# Patient Record
Sex: MALE | Race: WHITE | Employment: OTHER | URBAN - METROPOLITAN AREA
[De-identification: names, ages, dates, MRNs, and addresses within clinical notes are randomized per-mention and may not be internally consistent; named-entity substitution may affect disease eponyms.]

---

## 2017-03-05 ENCOUNTER — MYC MEDICAL ADVICE (OUTPATIENT)
Dept: INTERNAL MEDICINE | Facility: CLINIC | Age: 62
End: 2017-03-05

## 2017-03-06 NOTE — TELEPHONE ENCOUNTER
Per pt's mychart message below:  Cialis 5mg daily is req a PA.    Insur [phone]    Initiate a PA or diff rx for dx: slowing of urinary stream?    Last OV 12-2-16    Please advise, thanks.

## 2017-03-07 NOTE — TELEPHONE ENCOUNTER
Gael message sent to pt asking him to check with his insurance on what meds are preferred for slowing of the urinary stream.

## 2017-03-07 NOTE — TELEPHONE ENCOUNTER
Please have patient investigate which med is preferred for slowing of urinary stream--we should try this first (more likely to receive PA approval if formulary drug first tried).   Please advise pt.

## 2017-05-03 ENCOUNTER — MYC MEDICAL ADVICE (OUTPATIENT)
Dept: INTERNAL MEDICINE | Facility: CLINIC | Age: 62
End: 2017-05-03

## 2017-05-04 ENCOUNTER — MYC MEDICAL ADVICE (OUTPATIENT)
Dept: INTERNAL MEDICINE | Facility: CLINIC | Age: 62
End: 2017-05-04

## 2017-05-04 NOTE — TELEPHONE ENCOUNTER
Pt sent [employer] message stating [facility] needs additional information from our office before they can dispense Cialis 5mg. Pt asking that we call [phone] to address this.     Called phone number provided by pt, this for PA department and recording states they do not accept PA over the phone and to go to [email or web address] to complete PA for pt.     PA was previously completed for Cialis through [employer] in Dec 2016. Contacted pt, he now has [facility] for prescription coverage. [facility] information listed below for pt.     [facility]  ID: [id number]  Rx BIN: [id number]  Rx PCN: [id number]  Rx GRP: [id number]    PA submitted via [email or web address]. Response states that PA was already submitted and denied for pt. Case ID: [id number].     Contacted [employer], PA was denied due to no response from our office. PA re-opened, clinical questions answered. PA denied because pt has not tried alpha-1 blocker or alpha-5 regectase for BPH. Pt takes Cialis for both BPH and ED symptoms - PA representative states we can submit an appeal over the phone. Call transferred to the appeals department. Appeal submitted for Cialis 5mg for 90 tablets for 90 days and pt takes for ED and BPH.     Appeal approved over the phone for Cialis 5mg for 18 tabs per 90 days from 4/4/17-5/4/18. We will need to submit separate request for quantity override to allow for daily use. Transferred back to PA department.     PA submitted over the phone for Quantity Override.   Approved from 4/4/17-5/4/18.  ID: [id number]

## 2017-12-04 ENCOUNTER — HOSPITAL ENCOUNTER (OUTPATIENT)
Facility: CLINIC | Age: 62
Discharge: HOME | End: 2017-12-04
Attending: COLON & RECTAL SURGERY | Admitting: COLON & RECTAL SURGERY
Payer: COMMERCIAL

## 2017-12-04 VITALS
OXYGEN SATURATION: 97 % | SYSTOLIC BLOOD PRESSURE: 122 MMHG | BODY MASS INDEX: 27.09 KG/M2 | RESPIRATION RATE: 16 BRPM | HEIGHT: 72 IN | WEIGHT: 200 LBS | DIASTOLIC BLOOD PRESSURE: 68 MMHG

## 2017-12-04 LAB — COLONOSCOPY: NORMAL

## 2017-12-04 PROCEDURE — 45378 DIAGNOSTIC COLONOSCOPY: CPT | Performed by: COLON & RECTAL SURGERY

## 2017-12-04 PROCEDURE — G0500 MOD SEDAT ENDO SERVICE >5YRS: HCPCS | Performed by: COLON & RECTAL SURGERY

## 2017-12-04 PROCEDURE — G0121 COLON CA SCRN NOT HI RSK IND: HCPCS | Performed by: COLON & RECTAL SURGERY

## 2017-12-04 PROCEDURE — 25000128 H RX IP 250 OP 636: Performed by: COLON & RECTAL SURGERY

## 2017-12-04 PROCEDURE — 99153 MOD SED SAME PHYS/QHP EA: CPT | Performed by: COLON & RECTAL SURGERY

## 2017-12-04 RX ORDER — NALOXONE HYDROCHLORIDE 0.4 MG/ML
.1-.4 INJECTION, SOLUTION INTRAMUSCULAR; INTRAVENOUS; SUBCUTANEOUS
Status: DISCONTINUED | OUTPATIENT
Start: 2017-12-04 | End: 2017-12-04 | Stop reason: HOSPADM

## 2017-12-04 RX ORDER — FENTANYL CITRATE 50 UG/ML
INJECTION, SOLUTION INTRAMUSCULAR; INTRAVENOUS PRN
Status: DISCONTINUED | OUTPATIENT
Start: 2017-12-04 | End: 2017-12-04 | Stop reason: HOSPADM

## 2017-12-04 RX ORDER — FLUMAZENIL 0.1 MG/ML
0.2 INJECTION, SOLUTION INTRAVENOUS
Status: DISCONTINUED | OUTPATIENT
Start: 2017-12-04 | End: 2017-12-04 | Stop reason: HOSPADM

## 2017-12-04 RX ORDER — ONDANSETRON 2 MG/ML
4 INJECTION INTRAMUSCULAR; INTRAVENOUS
Status: DISCONTINUED | OUTPATIENT
Start: 2017-12-04 | End: 2017-12-04 | Stop reason: HOSPADM

## 2017-12-04 RX ORDER — ONDANSETRON 2 MG/ML
4 INJECTION INTRAMUSCULAR; INTRAVENOUS EVERY 6 HOURS PRN
Status: DISCONTINUED | OUTPATIENT
Start: 2017-12-04 | End: 2017-12-04 | Stop reason: HOSPADM

## 2017-12-04 RX ORDER — LIDOCAINE 40 MG/G
CREAM TOPICAL
Status: DISCONTINUED | OUTPATIENT
Start: 2017-12-04 | End: 2017-12-04 | Stop reason: HOSPADM

## 2017-12-04 RX ORDER — ONDANSETRON 4 MG/1
4 TABLET, ORALLY DISINTEGRATING ORAL EVERY 6 HOURS PRN
Status: DISCONTINUED | OUTPATIENT
Start: 2017-12-04 | End: 2017-12-04 | Stop reason: HOSPADM

## 2017-12-04 NOTE — H&P
Pre-Endoscopy History and Physical     Garrett Childers MRN# [id number]   YOB: 1955 Age: 61 year old     Date of Procedure: 12/4/2017  Primary care provider: Bong Vanegas  Type of Endoscopy: colonoscopy  Reason for Procedure: screening  Type of Anesthesia Anticipated: Moderate Sedation    HPI:    Garrett is a 61 year old male who will be undergoing the above procedure.      A history and physical has been performed. The patient's medications and allergies have been reviewed. The risks and benefits of the procedure and the sedation options and risks were discussed with the patient.  All questions were answered and informed consent was obtained.      He denies a personal or family history of anesthesia complications or bleeding disorders.     Allergies   Allergen Reactions     Lisinopril      Cough.        Prior to Admission Medications   Prescriptions Last Dose Informant Patient Reported? Taking?   tadalafil (CIALIS) 20 MG tablet More than a month  No No   Sig: Take 0.5-1 tablets (10-20 mg) by mouth daily as needed for erectile dysfunction   tadalafil (CIALIS) 5 MG tablet More than a month  No No   Sig: Take 1 tablet (5 mg) by mouth daily   triamterene-hydrochlorothiazide (MAXZIDE-25) 37.5-25 MG per tablet 12/3/2017  No Yes   Sig: Take 1 tablet by mouth daily      Facility-Administered Medications: None       Patient Active Problem List   Diagnosis     Essential hypertension with goal blood pressure less than 140/90     HYPERLIPIDEMIA LDL GOAL <130     Advanced directives, counseling/discussion     Lumbar facet arthropathy     DDD (degenerative disc disease), lumbar     Central spinal stenosis     Lumbar disc herniation        Past Medical History:   Diagnosis Date     Unspecified essential hypertension         Past Surgical History:   Procedure Laterality Date     C NONSPECIFIC PROCEDURE  ~1977    Left knee surg for osteochondroma     C NONSPECIFIC PROCEDURE  10/7/2015    Sebaceious cyst removal,  left upper arm     COLONOSCOPY  2007    Normal, repeat in 2017     HERNIORRHAPHY INGUINAL Left 2015    Procedure: HERNIORRHAPHY INGUINAL;  Surgeon: Tyler Michael MD;  Location: [facility] OR     ORTHOPEDIC SURGERY      corn shaved from left foot       Social History   Substance Use Topics     Smoking status: Passive Smoke Exposure - Never Smoker     Types: Cigars     Smokeless tobacco: Former User      Comment: cigar, rarely     Alcohol use Yes      Comment: 2 beers or one mixed drink daily, about three nights per week       Family History   Problem Relation Age of Onset     CANCER Daughter 15     PTLD B-cell large-cell Non-Hodgkin's,  at 16     Family History Negative Mother      Born 193. Lives in assisted living.      Eye Disorder Father      Macular degeneration.      Prostate Cancer Father      Hypertension Father      Neurologic Disorder Father       age 84. Alzheimers, also depression     Family History Negative Sister      Born      HEART DISEASE Daughter 0     twin born  heart transplant 3 months old CVA 1 /3 yo     CEREBROVASCULAR DISEASE Daughter 1     Family History Negative Son           Family History Negative Daughter      twin born      HEART DISEASE Maternal Grandmother       64yo MI     Neurologic Disorder Maternal Grandfather       62yo Parkinson's     HEART DISEASE Paternal Grandmother       69yo      Family History Negative Paternal Grandfather       91yo      Colon Cancer No family hx of        REVIEW OF SYSTEMS:     5 point ROS negative except as noted above in HPI, including Gen., Resp., CV, GI &  system review.      PHYSICAL EXAM:   /66  Resp 12  Ht 1.829 m (6')  Wt 90.7 kg (200 lb)  SpO2 96%  BMI 27.12 kg/m2 Estimated body mass index is 27.12 kg/(m^2) as calculated from the following:    Height as of this encounter: 1.829 m (6').    Weight as of this encounter: 90.7 kg (200 lb).   GENERAL APPEARANCE: healthy and  alert  MENTAL STATUS: alert  AIRWAY EXAM: Mallampatti Class II (visualization of the soft palate, fauces, and uvula)  RESP: lungs clear to auscultation - no rales, rhonchi or wheezes  CV: regular rates and rhythm      DIAGNOSTICS:    Not indicated      IMPRESSION   ASA Class 2 - Mild systemic disease        PLAN:       Plan for colonoscopy. We discussed the risks, benefits and alternatives and the patient wished to proceed.    The above has been forwarded to the consulting provider.      Signed Electronically by: Angeles Vyas MD  December 4, 2017

## 2017-12-04 NOTE — IP AVS SNAPSHOT
MRN:[id number]                      After Visit Summary   12/4/2017    Garrett Childers    MRN: [id number]           Thank you!     Thank you for choosing [facility] for your care. Our goal is always to provide you with excellent care. Hearing back from our patients is one way we can continue to improve our services. Please take a few minutes to complete the written survey that you may receive in the mail after you visit. If you would like to speak to someone directly about your visit please contact Patient Relations at [phone]. Thank you!          Patient Information     Date Of Birth          1955        About your hospital stay     You were admitted on:  December 4, 2017 You last received care in the:  [facility]    You were discharged on:  December 4, 2017       Who to Call     For medical emergencies, please call 911.  For non-urgent questions about your medical care, please call your primary care provider or clinic, [phone]  For questions related to your surgery, please call your surgery clinic        Attending Provider     Provider Specialty    Angeles Vyas MD Colon and Rectal Surgery       Primary Care Provider Office Phone # Fax #    Bong Vanegas [phone]      Your next 10 appointments already scheduled     Dec 08, 2017  8:00 AM CST   PHYSICAL with Bong [facility]as MD   [facility]    [facility] [address] [facility] [id number] [phone]              Further instructions from your care team       The patient has received a copy of the Provation  report the doctor has written and discharge instructions have been discussed with the patient and responsible adult.  All questions were addressed and answered prior to patient discharge.    Pending Results     No orders found from 12/2/2017 to 12/5/2017.            Admission Information     Date & Time Provider  Department Dept. Phone    12/4/2017 Angeles Vyas MD [facility] Endoscopy [phone]      Your Vitals Were     Blood Pressure Respirations Height Weight Pulse Oximetry BMI (Body Mass Index)    127/61 16 1.829 m (6') 90.7 kg (200 lb) 97% 27.12 kg/m2      MyChart Information     [employer] gives you secure access to your electronic health record. If you see a primary care provider, you can also send messages to your care team and make appointments. If you have questions, please call your primary care clinic.  If you do not have a primary care provider, please call [phone] and they will assist you.        Care EveryWhere ID     This is your Care EveryWhere ID. This could be used by other organizations to access your [facility] medical records  [id number]        Equal Access to Services     MARTA MASSEY : Shweta Valente, bello kan, jarrett lennon, randa oconnor. So [facility] [phone].    ATENCIÓN: Si habla español, tiene a gómez disposición servicios gratuitos de asistencia lingüística. [facility] [place] [phone].    We comply with applicable federal civil rights laws and [place] laws. We do not discriminate on the basis of race, color, national origin, age, disability, sex, sexual orientation, or gender identity.               Review of your medicines      CONTINUE these medicines which have NOT CHANGED        Dose / Directions    * tadalafil 5 MG tablet   Commonly known as:  CIALIS   Used for:  Slowing of urinary stream        Dose:  5 mg   Take 1 tablet (5 mg) by mouth daily   Quantity:  90 tablet   Refills:  3       * tadalafil 20 MG tablet   Commonly known as:  CIALIS   Used for:  Erectile dysfunction, unspecified erectile dysfunction type, Slowing of urinary stream        Dose:  10-20 mg   Take 0.5-1 tablets (10-20 mg) by mouth daily as needed for erectile dysfunction   Quantity:  36 tablet   Refills:  3       triamterene-hydrochlorothiazide  37.5-25 MG per tablet   Commonly known as:  MAXZIDE-25   Used for:  Essential hypertension with goal blood pressure less than 140/90        Dose:  1 tablet   Take 1 tablet by mouth daily   Quantity:  90 tablet   Refills:  3       * Notice:  This list has 2 medication(s) that are the same as other medications prescribed for you. Read the directions carefully, and ask your doctor or other care provider to review them with you.             Protect others around you: Learn how to safely use, store and throw away your medicines at www.[employer] [email or web address].             Medication List: This is a list of all your medications and when to take them. Check marks below indicate your daily home schedule. Keep this list as a reference.      Medications           Morning Afternoon Evening Bedtime As Needed    * tadalafil 5 MG tablet   Commonly known as:  CIALIS   Take 1 tablet (5 mg) by mouth daily                                * tadalafil 20 MG tablet   Commonly known as:  CIALIS   Take 0.5-1 tablets (10-20 mg) by mouth daily as needed for erectile dysfunction                                triamterene-hydrochlorothiazide 37.5-25 MG per tablet   Commonly known as:  MAXZIDE-25   Take 1 tablet by mouth daily                                * Notice:  This list has 2 medication(s) that are the same as other medications prescribed for you. Read the directions carefully, and ask your doctor or other care provider to review them with you.              More Information        Understanding Hemorrhoids    Hemorrhoid tissues are  cushions  of blood vessels that swell slightly during bowel movements. Too much pressure on the anal canal can make these tissues remain enlarged, become inflamed, and cause symptoms. This can happen both inside and outside the anal canal.  Parts of the anal canal  The parts of the anal canal are:    Internal hemorrhoid tissue is in the upper area of the anal canal.    The rectum is the last several inches of the  colon. This is where stool is stored prior to bowel movements.    Anal sphincters are ring-shaped muscles that expand and contract to control the anal opening.    External hemorrhoid tissue lies under the anal skin.    The anus is the passage between the rectum and the outside of the body.  Normal hemorrhoid tissue  Hemorrhoid tissues play an important role in helping your body eliminate waste. Food passes from the stomach through the intestines. The waste (stool) then travels through the colon to the rectum. It is stored in the rectum until it s ready to be passed from the anus. During bowel movements, hemorrhoids swell with blood and become slightly larger. This swelling helps protect and cushion the anal canal as stool passes from the body. Once the stool has passed, the tissues stop swelling and return to normal.  Problem hemorrhoids  Pressure due to straining or other factors can cause hemorrhoid tissues to remain swollen. When this happens to the hemorrhoid tissues in the anal canal they re called internal hemorrhoids. Swollen tissues around the anal opening are called external hemorrhoids. Depending on the location, your symptoms can differ.    Internal hemorrhoids often happen in clusters around the wall of the anal canal. They are usually painless. But they may prolapse (protrude out of the anus) due to straining or pressure from hard stool. After the bowel movement is over, they may then reduce (return inside the body). Internal hemorrhoids often bleed. They can also discharge mucus.      External hemorrhoids are located at the anal opening, just beneath the skin. These tissues rarely cause problems unless they thrombose (form a blood clot). When this happens, a hard, bluish lump may appear. A thrombosed hemorrhoid also causes sudden, severe pain. In time, the clot may go away on its own. This sometimes leaves a  skin tag  of tissue stretched by the clot.  Hemorrhoid symptoms  Hemorrhoid symptoms may  include:    Pain or a burning sensation    Bleeding during bowel movements    Protrusion of tissue from the anus    Itching around the anus  Causes of hemorrhoids  There s no single cause of hemorrhoids. Most often, though, they are caused by too much pressure on the anal canal. This can be due to:    Chronic (ongoing) constipation    Straining during bowel movements    Sitting too long on the toilet    Strenuous exercise or heavy lifting    Pregnancy and childbirth    Aging    Diarrhea  Date Last Reviewed: 7/1/2016 2000-2017 [employer]. [address] [place], [place] [address]. All rights reserved. This information is not intended as a substitute for professional medical care. Always follow your healthcare professional's instructions.

## 2017-12-04 NOTE — OP NOTE
See Provation Note In Chart    Angeles Vyas MD  [facility]  Office Phone # [phone]

## 2017-12-14 ENCOUNTER — MYC MEDICAL ADVICE (OUTPATIENT)
Dept: INTERNAL MEDICINE | Facility: CLINIC | Age: 62
End: 2017-12-14

## 2017-12-14 DIAGNOSIS — R39.198 SLOWING OF URINARY STREAM: ICD-10-CM

## 2017-12-15 RX ORDER — TADALAFIL 5 MG/1
5 TABLET ORAL DAILY
Qty: 90 TABLET | Refills: 0 | Status: SHIPPED | OUTPATIENT
Start: 2017-12-15 | End: 2018-02-16

## 2017-12-15 NOTE — TELEPHONE ENCOUNTER
Pt missed appt on 12/8/17, rescheduled to February. Pt asks if he can get refill on Cialis until his appt.     Next 5 appointments (look out 90 days)     Feb 16, 2018  8:20 AM CST   PHYSICAL with Bong [facility]as MD   [facility]    [address]  [facility] [address]   [phone]                 Medication is being filled for 1 time refill only due to:  Patient needs to be seen because it has been more than one year since last visit.

## 2018-02-15 DIAGNOSIS — I10 ESSENTIAL HYPERTENSION WITH GOAL BLOOD PRESSURE LESS THAN 140/90: ICD-10-CM

## 2018-02-16 ENCOUNTER — OFFICE VISIT (OUTPATIENT)
Dept: INTERNAL MEDICINE | Facility: CLINIC | Age: 63
End: 2018-02-16
Payer: COMMERCIAL

## 2018-02-16 VITALS
BODY MASS INDEX: 26.51 KG/M2 | SYSTOLIC BLOOD PRESSURE: 120 MMHG | WEIGHT: 200 LBS | HEIGHT: 73 IN | HEART RATE: 77 BPM | DIASTOLIC BLOOD PRESSURE: 70 MMHG | TEMPERATURE: 98 F | OXYGEN SATURATION: 100 %

## 2018-02-16 DIAGNOSIS — Z12.5 SPECIAL SCREENING FOR MALIGNANT NEOPLASM OF PROSTATE: ICD-10-CM

## 2018-02-16 DIAGNOSIS — I10 ESSENTIAL HYPERTENSION WITH GOAL BLOOD PRESSURE LESS THAN 140/90: ICD-10-CM

## 2018-02-16 DIAGNOSIS — Z00.00 ROUTINE GENERAL MEDICAL EXAMINATION AT A HEALTH CARE FACILITY: Primary | ICD-10-CM

## 2018-02-16 DIAGNOSIS — Z23 NEED FOR TETANUS BOOSTER: ICD-10-CM

## 2018-02-16 DIAGNOSIS — Z11.59 NEED FOR HEPATITIS C SCREENING TEST: ICD-10-CM

## 2018-02-16 DIAGNOSIS — R39.198 SLOWING OF URINARY STREAM: ICD-10-CM

## 2018-02-16 DIAGNOSIS — E78.5 HYPERLIPIDEMIA LDL GOAL <130: ICD-10-CM

## 2018-02-16 LAB
ERYTHROCYTE [DISTWIDTH] IN BLOOD BY AUTOMATED COUNT: 12.9 % (ref 10–15)
HCT VFR BLD AUTO: 48.7 % (ref 40–53)
HGB BLD-MCNC: 15.9 G/DL (ref 13.3–17.7)
MCH RBC QN AUTO: 30.7 PG (ref 26.5–33)
MCHC RBC AUTO-ENTMCNC: 32.6 G/DL (ref 31.5–36.5)
MCV RBC AUTO: 94 FL (ref 78–100)
PLATELET # BLD AUTO: 176 10E9/L (ref 150–450)
PSA SERPL-ACNC: 3.84 UG/L (ref 0–4)
RBC # BLD AUTO: 5.18 10E12/L (ref 4.4–5.9)
WBC # BLD AUTO: 4.4 10E9/L (ref 4–11)

## 2018-02-16 PROCEDURE — 99396 PREV VISIT EST AGE 40-64: CPT | Performed by: INTERNAL MEDICINE

## 2018-02-16 PROCEDURE — 80053 COMPREHEN METABOLIC PANEL: CPT | Performed by: INTERNAL MEDICINE

## 2018-02-16 PROCEDURE — 36415 COLL VENOUS BLD VENIPUNCTURE: CPT | Performed by: INTERNAL MEDICINE

## 2018-02-16 PROCEDURE — 85027 COMPLETE CBC AUTOMATED: CPT | Performed by: INTERNAL MEDICINE

## 2018-02-16 PROCEDURE — 90715 TDAP VACCINE 7 YRS/> IM: CPT | Performed by: INTERNAL MEDICINE

## 2018-02-16 PROCEDURE — 80061 LIPID PANEL: CPT | Performed by: INTERNAL MEDICINE

## 2018-02-16 PROCEDURE — G0103 PSA SCREENING: HCPCS | Performed by: INTERNAL MEDICINE

## 2018-02-16 PROCEDURE — 86803 HEPATITIS C AB TEST: CPT | Performed by: INTERNAL MEDICINE

## 2018-02-16 PROCEDURE — 84443 ASSAY THYROID STIM HORMONE: CPT | Performed by: INTERNAL MEDICINE

## 2018-02-16 RX ORDER — TADALAFIL 5 MG/1
5 TABLET ORAL DAILY
Qty: 90 TABLET | Refills: 3 | Status: SHIPPED | OUTPATIENT
Start: 2018-02-16 | End: 2019-02-15

## 2018-02-16 RX ORDER — TRIAMTERENE/HYDROCHLOROTHIAZID 37.5-25 MG
1 TABLET ORAL DAILY
Qty: 90 TABLET | Refills: 3 | Status: SHIPPED | OUTPATIENT
Start: 2018-02-16 | End: 2019-02-11

## 2018-02-16 NOTE — PROGRESS NOTES
SUBJECTIVE:   CC: Garrett Childers is an 62 year old male who presents for preventative health visit.     Physical   Annual:     Getting at least 3 servings of Calcium per day::  Yes    Bi-annual eye exam::  Yes    Dental care twice a year::  Yes    Sleep apnea or symptoms of sleep apnea::  None    Diet::  Carbohydrate counting    Frequency of exercise::  4-5 days/week    Duration of exercise::  30-45 minutes    Taking medications regularly::  Yes    Medication side effects::  None    Additional concerns today::  YES          Past/recent records reviewed and discussed for:  -Had a colonoscopy performed 12/2017  -The past two days he has woken up with a slight productive cough.     Weight  He goes to [email or web address] 3-4 times a week. He has lost about 10 lbs since last clinic visit.     Hypertension  BP at target today, 120/70.  He is taking triamterene-HCTZ daily.     Slowing of urinary stream  Taking cialis 5 mg which works well for him. Requested refill. Notes that slowing of urinary stream improved. He hardly wakes up to urinate at night now. He denies any dysuria or hematuria.     Low back pain  He hurt his back 2-3 years ago but recently hurt it again. Notes a low right back pain that radiates down to the right thigh but does not go past the knee. He denies any leg weakness or numbness.      Today's PHQ-2 Score:   PHQ-2 ( 1999 [employer]izer) 2/13/2018   Q1: Little interest or pleasure in doing things 0   Q2: Feeling down, depressed or hopeless 0   PHQ-2 Score 0   Q1: Little interest or pleasure in doing things Not at all   Q2: Feeling down, depressed or hopeless Not at all   PHQ-2 Score 0     Abuse: Current or Past(Physical, Sexual or Emotional)- No  Do you feel safe in your environment - Yes    Social History   Substance Use Topics     Smoking status: Passive Smoke Exposure - Never Smoker     Types: Cigars     Smokeless tobacco: Former User     Quit date: 1/1/2005      Comment: cigar, rarely     Alcohol use Yes       "Comment: <6 drinks/week. May have 2 beers or one mixed drinks on a given day     Alcohol Use 2/13/2018   If you drink alcohol, do you typically have greater than 3 drinks per day OR greater than 7 drinks per week?   No     Last PSA:   PSA   Date Value Ref Range Status   12/02/2016 2.92 0 - 4 ug/L Final     Comment:     Assay Method:  Chemiluminescence using Siemens Vista analyzer     Reviewed orders with patient. Reviewed health maintenance and updated orders accordingly - Yes    Reviewed and updated as needed this visit by clinical staff  Allergies  Meds         Reviewed and updated as needed this visit by Provider        Review of Systems  C: NEGATIVE for fever, chills, change in weight  I: NEGATIVE for worrisome rashes, moles or lesions  E: NEGATIVE for vision changes or irritation  ENT: NEGATIVE for ear, mouth and throat problems  R: NEGATIVE for significant cough or SOB  CV: NEGATIVE for chest pain, palpitations or peripheral edema  GI: NEGATIVE for nausea, abdominal pain, heartburn, or change in bowel habits   male: negative for dysuria, hematuria, decreased urinary stream, erectile dysfunction, urethral discharge  M: POSITIVE for low right back pain NEGATIVE for significant arthralgias or myalgia  N: NEGATIVE for weakness, dizziness or paresthesias  P: NEGATIVE for changes in mood or affect    This document serves as a record of the services and decisions personally performed and made by Bong Vanegas MD. It was created on his behalf by Sheree Jasso, a trained medical scribe. The creation of this document is based on the provider's statements to the medical scribe.  Sheree Jasso February 16, 2018 8:43 AM     OBJECTIVE:   /70 (BP Location: Left arm, Cuff Size: Adult Large)  Pulse 77  Temp 98  F (36.7  C) (Oral)  Ht 1.842 m (6' 0.5\")  Wt 90.7 kg (200 lb)  SpO2 100%  BMI 26.75 kg/m2    Physical Exam  GENERAL: healthy, alert and no distress  EYES: Eyes grossly normal to inspection, PERRL and " conjunctivae and sclerae normal  HENT: ear canals and TM's normal, nose and mouth without ulcers or lesions  NECK: no adenopathy, no asymmetry, masses, or scars and thyroid normal to palpation  RESP: lungs clear to auscultation - no rales, rhonchi or wheezes  CV: regular rate and rhythm, normal S1 S2, no S3 or S4, no murmur, click or rub, no peripheral edema and peripheral pulses strong  ABDOMEN: soft, nontender, no hepatosplenomegaly, no masses and bowel sounds normal   (male): normal male genitalia without lesions or urethral discharge, no hernia  RECTAL: normal sphincter tone, no rectal masses, prostate normal size, smooth, nontender without nodules or masses  MS: no gross musculoskeletal defects noted, no edema  SKIN: no suspicious lesions or rashes  NEURO: Normal strength and tone, mentation intact and speech normal  PSYCH: mentation appears normal, affect normal/bright    ASSESSMENT/PLAN:   (Z00.00) Routine general medical examination at a health care facility  (primary encounter diagnosis)  Comment: Stable health. See epic orders.    (E78.5) Hyperlipidemia LDL goal <130  Comment: Updating lipids today. Not on a statin    (I10) Essential hypertension with goal blood pressure less than 140/90  Comment: BP at target. Continue current meds.  Plan: triamterene-hydrochlorothiazide (MAXZIDE-25)         37.5-25 MG per tablet, aspirin 81 MG tablet          (Z23) Need for tetanus booster  Comment: Administered today  Plan: TDAP VACCINE (ADACEL)          (Z11.59) Need for hepatitis C screening test  Comment: Screening today  Plan: **Hepatitis C Screen Reflex to RNA FUTURE         anytime          (Z12.5) Special screening for malignant neoplasm of prostate  Comment: PSA screening today    (R39.198) Slowing of urinary stream  Comment: Improved with cialis. Refilled rx, continue current meds.   Plan: tadalafil (CIALIS) 5 MG tablet         Everything looks fine!    Refills of medications have been faxed to your pharmacy.  "    I'll get back to you with lab results soon, especially if there is anything of concern.      See you in a year, sooner if problems.       COUNSELING:   Reviewed preventive health counseling, as reflected in patient instructions       Regular exercise       Healthy diet/nutrition       Colon cancer screening       Prostate cancer screening     reports that he is a non-smoker but has been exposed to tobacco smoke. He quit smokeless tobacco use about 13 years ago.  Estimated body mass index is 26.75 kg/(m^2) as calculated from the following:    Height as of this encounter: 1.842 m (6' 0.5\").    Weight as of this encounter: 90.7 kg (200 lb).   Weight management plan: Discussed healthy diet and exercise guidelines and patient will follow up in 12 months in clinic to re-evaluate.    Counseling Resources:  ATP IV Guidelines  Pooled Cohorts Equation Calculator  FRAX Risk Assessment  ICSI Preventive Guidelines  Dietary Guidelines for Americans, 2010  USDA's MyPlate  ASA Prophylaxis  Lung CA Screening    The information in this document, created by the medical scribe for me, accurately reflects the services I personally performed and the decisions made by me. I have reviewed and approved this document for accuracy prior to leaving the patient care area.  February 16, 2018 8:43 AM    Bong Vanegas MD  [facility]    Answers for HPI/ROS submitted by the patient on 2/13/2018   PHQ-2 Score: 0    "

## 2018-02-16 NOTE — NURSING NOTE
"Chief Complaint   Patient presents with     Physical     fasting       Initial /70 (BP Location: Left arm, Cuff Size: Adult Large)  Pulse 77  Temp 98  F (36.7  C) (Oral)  Ht 6' 0.5\" (1.842 m)  Wt 200 lb (90.7 kg)  SpO2 100%  BMI 26.75 kg/m2 Estimated body mass index is 26.75 kg/(m^2) as calculated from the following:    Height as of this encounter: 6' 0.5\" (1.842 m).    Weight as of this encounter: 200 lb (90.7 kg).  Medication Reconciliation: complete   Angelia Barragan CMA      "

## 2018-02-16 NOTE — MR AVS SNAPSHOT
After Visit Summary   2/16/2018    Garrett Childers    MRN: [id number]           Patient Information     Date Of Birth          1955        Visit Information        Provider Department      2/16/2018 8:20 AM Bong Vanegas MD [facility]        Today's Diagnoses     Routine general medical examination at a health care facility    -  1    Hyperlipidemia LDL goal <130        Essential hypertension with goal blood pressure less than 140/90        Need for tetanus booster        Need for hepatitis C screening test        Special screening for malignant neoplasm of prostate        Slowing of urinary stream          Care Instructions    Everything looks fine!    Refills of medications have been faxed to your pharmacy.     I'll get back to you with lab results soon, especially if there is anything of concern.      See you in a year, sooner if problems.            Follow-ups after your visit        Who to contact     If you have questions or need follow up information about today's clinic visit or your schedule please contact [facility] directly at [phone].  Normal or non-critical lab and imaging results will be communicated to you by MyChart, letter or phone within 4 business days after the clinic has received the results. If you do not hear from us within 7 days, please contact the clinic through [employer] or phone. If you have a critical or abnormal lab result, we will notify you by phone as soon as possible.  Submit refill requests through [employer] or call your pharmacy and they will forward the refill request to us. Please allow 3 business days for your refill to be completed.          Additional Information About Your Visit        [employer] Information     [employer] gives you secure access to your electronic health record. If you see a primary care provider, you can also send messages to your care team and make appointments. If you have questions, please call your  "primary care clinic.  If you do not have a primary care provider, please call [phone] and they will assist you.        Care EveryWhere ID     This is your Care EveryWhere ID. This could be used by other organizations to access your [place] medical records  [id number]        Your Vitals Were     Pulse Temperature Height Pulse Oximetry BMI (Body Mass Index)       77 98  F (36.7  C) (Oral) 6' 0.5\" (1.842 m) 100% 26.75 kg/m2        Blood Pressure from Last 3 Encounters:   02/16/18 120/70   12/04/17 122/68   12/02/16 128/74    Weight from Last 3 Encounters:   02/16/18 200 lb (90.7 kg)   12/04/17 200 lb (90.7 kg)   12/02/16 209 lb (94.8 kg)              We Performed the Following     **Hepatitis C Screen Reflex to RNA FUTURE anytime     CBC with platelets     Comprehensive metabolic panel     Lipid panel reflex to direct LDL Fasting     Prostate spec antigen screen     TDAP VACCINE (ADACEL)     TSH with free T4 reflex          Today's Medication Changes          These changes are accurate as of 2/16/18  9:09 AM.  If you have any questions, ask your nurse or doctor.               Start taking these medicines.        Dose/Directions    aspirin 81 MG tablet   Used for:  Essential hypertension with goal blood pressure less than 140/90   Started by:  Bong Vanegas MD        Dose:  81 mg   Take 1 tablet (81 mg) by mouth daily   Quantity:  30 tablet   Refills:  0            Where to get your medicines      These medications were sent to [facility] [address] [address]     Phone:  [phone]     tadalafil 5 MG tablet    triamterene-hydrochlorothiazide 37.5-25 MG per tablet         Some of these will need a paper prescription and others can be bought over the counter.  Ask your nurse if you have questions.     You don't need a prescription for these medications     aspirin 81 MG tablet                Primary Care Provider Office Phone # " Fax #    Bong Vanegas [phone]       [address] [facility] [address]  [facility] [address]        Equal Access to Services     MARTA MASSEY : Hadtitus uri goddard zohaibo Sojarekali, waessieda luqadaha, qaybta kaalmada citlalli, randa barbosalilliana anastasia. So [facility] [phone].    ATENCIÓN: Si habla español, tiene a gómez disposición servicios gratuitos de asistencia lingüística. Llame al [phone].    We comply with applicable federal civil rights laws and [place] laws. We do not discriminate on the basis of race, color, national origin, age, disability, sex, sexual orientation, or gender identity.            Thank you!     Thank you for choosing [facility]  for your care. Our goal is always to provide you with excellent care. Hearing back from our patients is one way we can continue to improve our services. Please take a few minutes to complete the written survey that you may receive in the mail after your visit with us. Thank you!             Your Updated Medication List - Protect others around you: Learn how to safely use, store and throw away your medicines at www.disposemymeds.org.          This list is accurate as of 2/16/18  9:09 AM.  Always use your most recent med list.                   Brand Name Dispense Instructions for use Diagnosis    aspirin 81 MG tablet     30 tablet    Take 1 tablet (81 mg) by mouth daily    Essential hypertension with goal blood pressure less than 140/90       * tadalafil 20 MG tablet    CIALIS    36 tablet    Take 0.5-1 tablets (10-20 mg) by mouth daily as needed for erectile dysfunction    Erectile dysfunction, unspecified erectile dysfunction type, Slowing of urinary stream       * tadalafil 5 MG tablet    CIALIS    90 tablet    Take 1 tablet (5 mg) by mouth daily    Slowing of urinary stream       triamterene-hydrochlorothiazide 37.5-25 MG per tablet    MAXZIDE-25    90 tablet    Take 1 tablet by mouth daily    Essential hypertension with  goal blood pressure less than 140/90       * Notice:  This list has 2 medication(s) that are the same as other medications prescribed for you. Read the directions carefully, and ask your doctor or other care provider to review them with you.

## 2018-02-17 LAB
ALBUMIN SERPL-MCNC: 4.1 G/DL (ref 3.4–5)
ALP SERPL-CCNC: 80 U/L (ref 40–150)
ALT SERPL W P-5'-P-CCNC: 47 U/L (ref 0–70)
ANION GAP SERPL CALCULATED.3IONS-SCNC: 4 MMOL/L (ref 3–14)
AST SERPL W P-5'-P-CCNC: 37 U/L (ref 0–45)
BILIRUB SERPL-MCNC: 0.6 MG/DL (ref 0.2–1.3)
BUN SERPL-MCNC: 13 MG/DL (ref 7–30)
CALCIUM SERPL-MCNC: 9 MG/DL (ref 8.5–10.1)
CHLORIDE SERPL-SCNC: 102 MMOL/L (ref 94–109)
CHOLEST SERPL-MCNC: 191 MG/DL
CO2 SERPL-SCNC: 34 MMOL/L (ref 20–32)
CREAT SERPL-MCNC: 0.83 MG/DL (ref 0.66–1.25)
GFR SERPL CREATININE-BSD FRML MDRD: >90 ML/MIN/1.7M2
GLUCOSE SERPL-MCNC: 88 MG/DL (ref 70–99)
HDLC SERPL-MCNC: 47 MG/DL
LDLC SERPL CALC-MCNC: 116 MG/DL
NONHDLC SERPL-MCNC: 144 MG/DL
POTASSIUM SERPL-SCNC: 3.9 MMOL/L (ref 3.4–5.3)
PROT SERPL-MCNC: 8 G/DL (ref 6.8–8.8)
SODIUM SERPL-SCNC: 140 MMOL/L (ref 133–144)
TRIGL SERPL-MCNC: 140 MG/DL
TSH SERPL DL<=0.005 MIU/L-ACNC: 2.75 MU/L (ref 0.4–4)

## 2018-02-19 LAB — HCV AB SERPL QL IA: NONREACTIVE

## 2018-02-19 RX ORDER — TRIAMTERENE/HYDROCHLOROTHIAZID 37.5-25 MG
TABLET ORAL
Qty: 90 TABLET | Refills: 2 | OUTPATIENT
Start: 2018-02-19

## 2018-05-30 ENCOUNTER — TELEPHONE (OUTPATIENT)
Dept: INTERNAL MEDICINE | Facility: CLINIC | Age: 63
End: 2018-05-30

## 2018-05-30 NOTE — TELEPHONE ENCOUNTER
PA request from [employer] for Cialis     They sent 2-pages of questions to be answered by the doctor. Then they will decide if it will be covered or not.    Form is in 's yellow folder  Fax back

## 2018-06-11 ENCOUNTER — MYC MEDICAL ADVICE (OUTPATIENT)
Dept: INTERNAL MEDICINE | Facility: CLINIC | Age: 63
End: 2018-06-11

## 2019-02-11 DIAGNOSIS — I10 ESSENTIAL HYPERTENSION WITH GOAL BLOOD PRESSURE LESS THAN 140/90: ICD-10-CM

## 2019-02-11 NOTE — TELEPHONE ENCOUNTER
"Requested Prescriptions   Pending Prescriptions Disp Refills     triamterene-HCTZ (MAXZIDE-25) 37.5-25 MG tablet [Pharmacy Med Name: TRIAMTERENE/HCTZ TABS 37.5/25]  Last Written Prescription Date:  2/16/2018  Last Fill Quantity: 90,  # refills: 3   Last office visit: 2/16/2018 with prescribing provider:     Future Office Visit:   Next 5 appointments (look out 90 days)    Feb 22, 2019  9:20 AM CST  [facility] [address]  [facility] [address]  [phone]        90 tablet 3     Sig: TAKE 1 TABLET DAILY    Diuretics (Including Combos) Protocol Passed - 2/11/2019 12:15 AM       Passed - Blood pressure under 140/90 in past 12 months    BP Readings from Last 3 Encounters:   02/16/18 120/70   12/04/17 122/68   12/02/16 128/74                Passed - Recent (12 mo) or future (30 days) visit within the authorizing provider's specialty    Patient had office visit in the last 12 months or has a visit in the next 30 days with authorizing provider or within the authorizing provider's specialty.  See \"Patient Info\" tab in inbasket, or \"Choose Columns\" in Meds & Orders section of the refill encounter.             Passed - Medication is active on med list       Passed - Patient is age 18 or older       Passed - Normal serum creatinine on file in past 12 months    Recent Labs   Lab Test 02/16/18 0913   CR 0.83             Passed - Normal serum potassium on file in past 12 months    Recent Labs   Lab Test 02/16/18 0913   POTASSIUM 3.9                   Passed - Normal serum sodium on file in past 12 months    Recent Labs   Lab Test 02/16/18 0913                 "

## 2019-02-13 RX ORDER — TRIAMTERENE/HYDROCHLOROTHIAZID 37.5-25 MG
TABLET ORAL
Qty: 90 TABLET | Refills: 0 | Status: SHIPPED | OUTPATIENT
Start: 2019-02-13 | End: 2019-02-22

## 2019-02-13 NOTE — TELEPHONE ENCOUNTER
Prescription approved per [facility] Refill Protocol. BRYCE Kunz R.N.

## 2019-02-15 DIAGNOSIS — R39.198 SLOWING OF URINARY STREAM: ICD-10-CM

## 2019-02-15 NOTE — TELEPHONE ENCOUNTER
"Requested Prescriptions   Pending Prescriptions Disp Refills     CIALIS 5 MG tablet [Pharmacy Med Name: CIALIS TABS 5MG] 90 tablet 3    Last Written Prescription Date:  02/16/2018  Last Fill Quantity: 90,  # refills: 3   Last office visit: 2/16/2018 with prescribing provider:     Future Office Visit:   Next 5 appointments (look out 90 days)    Feb 22, 2019  9:20 AM CST  [facility] [address]  [facility] [address]  [phone]        Sig: TAKE 1 TABLET DAILY    Erectile Dysfuction Protocol Passed - 2/15/2019 12:49 PM       Passed - Absence of nitrates on medication list       Passed - Absence of Alpha Blockers on Med list       Passed - Recent (12 mo) or future (30 days) visit within the authorizing provider's specialty    Patient had office visit in the last 12 months or has a visit in the next 30 days with authorizing provider or within the authorizing provider's specialty.  See \"Patient Info\" tab in inbasket, or \"Choose Columns\" in Meds & Orders section of the refill encounter.             Passed - Medication is active on med list       Passed - Patient is age 18 or older        "

## 2019-02-18 RX ORDER — TADALAFIL 5 MG
TABLET ORAL
Qty: 90 TABLET | Refills: 0 | Status: SHIPPED | OUTPATIENT
Start: 2019-02-18 | End: 2019-02-22

## 2019-02-20 ASSESSMENT — ENCOUNTER SYMPTOMS
CHILLS: 0
DIARRHEA: 0
PALPITATIONS: 0
DYSURIA: 0
PARESTHESIAS: 0
HEARTBURN: 0
FREQUENCY: 1
WEAKNESS: 0
JOINT SWELLING: 0
DIZZINESS: 0
HEADACHES: 0
NERVOUS/ANXIOUS: 0
EYE PAIN: 0
SHORTNESS OF BREATH: 0
MYALGIAS: 0
FEVER: 0
SORE THROAT: 0
NAUSEA: 0
HEMATOCHEZIA: 0
ABDOMINAL PAIN: 0
CONSTIPATION: 0
ARTHRALGIAS: 0
HEMATURIA: 0
COUGH: 0

## 2019-02-22 ENCOUNTER — OFFICE VISIT (OUTPATIENT)
Dept: INTERNAL MEDICINE | Facility: CLINIC | Age: 64
End: 2019-02-22
Payer: COMMERCIAL

## 2019-02-22 VITALS
BODY MASS INDEX: 27.9 KG/M2 | WEIGHT: 206 LBS | TEMPERATURE: 97.6 F | HEART RATE: 72 BPM | SYSTOLIC BLOOD PRESSURE: 122 MMHG | DIASTOLIC BLOOD PRESSURE: 70 MMHG | HEIGHT: 72 IN | RESPIRATION RATE: 16 BRPM | OXYGEN SATURATION: 98 %

## 2019-02-22 DIAGNOSIS — R39.198 SLOWING OF URINARY STREAM: ICD-10-CM

## 2019-02-22 DIAGNOSIS — Z00.00 ANNUAL PHYSICAL EXAM: Primary | ICD-10-CM

## 2019-02-22 DIAGNOSIS — I10 ESSENTIAL HYPERTENSION WITH GOAL BLOOD PRESSURE LESS THAN 140/90: ICD-10-CM

## 2019-02-22 LAB
ALBUMIN SERPL-MCNC: 4.1 G/DL (ref 3.4–5)
ALP SERPL-CCNC: 67 U/L (ref 40–150)
ALT SERPL W P-5'-P-CCNC: 44 U/L (ref 0–70)
ANION GAP SERPL CALCULATED.3IONS-SCNC: 6 MMOL/L (ref 3–14)
AST SERPL W P-5'-P-CCNC: 29 U/L (ref 0–45)
BASOPHILS # BLD AUTO: 0 10E9/L (ref 0–0.2)
BASOPHILS NFR BLD AUTO: 0.7 %
BILIRUB SERPL-MCNC: 0.8 MG/DL (ref 0.2–1.3)
BUN SERPL-MCNC: 15 MG/DL (ref 7–30)
CALCIUM SERPL-MCNC: 9.2 MG/DL (ref 8.5–10.1)
CHLORIDE SERPL-SCNC: 103 MMOL/L (ref 94–109)
CHOLEST SERPL-MCNC: 194 MG/DL
CO2 SERPL-SCNC: 28 MMOL/L (ref 20–32)
CREAT SERPL-MCNC: 0.94 MG/DL (ref 0.66–1.25)
DIFFERENTIAL METHOD BLD: NORMAL
EOSINOPHIL # BLD AUTO: 0.3 10E9/L (ref 0–0.7)
EOSINOPHIL NFR BLD AUTO: 6.5 %
ERYTHROCYTE [DISTWIDTH] IN BLOOD BY AUTOMATED COUNT: 13.3 % (ref 10–15)
GFR SERPL CREATININE-BSD FRML MDRD: 87 ML/MIN/{1.73_M2}
GLUCOSE SERPL-MCNC: 92 MG/DL (ref 70–99)
HCT VFR BLD AUTO: 47.1 % (ref 40–53)
HDLC SERPL-MCNC: 49 MG/DL
HGB BLD-MCNC: 15.7 G/DL (ref 13.3–17.7)
LDLC SERPL CALC-MCNC: 121 MG/DL
LYMPHOCYTES # BLD AUTO: 1.8 10E9/L (ref 0.8–5.3)
LYMPHOCYTES NFR BLD AUTO: 41.2 %
MCH RBC QN AUTO: 31.2 PG (ref 26.5–33)
MCHC RBC AUTO-ENTMCNC: 33.3 G/DL (ref 31.5–36.5)
MCV RBC AUTO: 94 FL (ref 78–100)
MONOCYTES # BLD AUTO: 0.5 10E9/L (ref 0–1.3)
MONOCYTES NFR BLD AUTO: 12.6 %
NEUTROPHILS # BLD AUTO: 1.7 10E9/L (ref 1.6–8.3)
NEUTROPHILS NFR BLD AUTO: 39 %
NONHDLC SERPL-MCNC: 145 MG/DL
PLATELET # BLD AUTO: 169 10E9/L (ref 150–450)
POTASSIUM SERPL-SCNC: 3.4 MMOL/L (ref 3.4–5.3)
PROT SERPL-MCNC: 7.2 G/DL (ref 6.8–8.8)
PSA SERPL-ACNC: 3.69 UG/L (ref 0–4)
RBC # BLD AUTO: 5.04 10E12/L (ref 4.4–5.9)
SODIUM SERPL-SCNC: 137 MMOL/L (ref 133–144)
TRIGL SERPL-MCNC: 121 MG/DL
TSH SERPL DL<=0.005 MIU/L-ACNC: 3.93 MU/L (ref 0.4–4)
WBC # BLD AUTO: 4.3 10E9/L (ref 4–11)

## 2019-02-22 PROCEDURE — 80053 COMPREHEN METABOLIC PANEL: CPT | Performed by: INTERNAL MEDICINE

## 2019-02-22 PROCEDURE — 36415 COLL VENOUS BLD VENIPUNCTURE: CPT | Performed by: INTERNAL MEDICINE

## 2019-02-22 PROCEDURE — G0103 PSA SCREENING: HCPCS | Performed by: INTERNAL MEDICINE

## 2019-02-22 PROCEDURE — 85025 COMPLETE CBC W/AUTO DIFF WBC: CPT | Performed by: INTERNAL MEDICINE

## 2019-02-22 PROCEDURE — 99396 PREV VISIT EST AGE 40-64: CPT | Performed by: INTERNAL MEDICINE

## 2019-02-22 PROCEDURE — 80061 LIPID PANEL: CPT | Performed by: INTERNAL MEDICINE

## 2019-02-22 PROCEDURE — 84443 ASSAY THYROID STIM HORMONE: CPT | Performed by: INTERNAL MEDICINE

## 2019-02-22 RX ORDER — TADALAFIL 5 MG/1
5 TABLET ORAL DAILY
Qty: 90 TABLET | Refills: 3 | Status: SHIPPED | OUTPATIENT
Start: 2019-02-22 | End: 2019-08-02

## 2019-02-22 RX ORDER — TRIAMTERENE/HYDROCHLOROTHIAZID 37.5-25 MG
1 TABLET ORAL DAILY
Qty: 90 TABLET | Refills: 3 | Status: SHIPPED | OUTPATIENT
Start: 2019-02-22 | End: 2020-02-13

## 2019-02-22 SDOH — HEALTH STABILITY: PHYSICAL HEALTH: ON AVERAGE, HOW MANY MINUTES DO YOU ENGAGE IN EXERCISE AT THIS LEVEL?: 60 MIN

## 2019-02-22 SDOH — SOCIAL STABILITY: SOCIAL NETWORK: HOW OFTEN DO YOU ATTEND CHURCH OR RELIGIOUS SERVICES?: NEVER

## 2019-02-22 SDOH — HEALTH STABILITY: MENTAL HEALTH: HOW MANY STANDARD DRINKS CONTAINING ALCOHOL DO YOU HAVE ON A TYPICAL DAY?: 1 OR 2

## 2019-02-22 SDOH — SOCIAL STABILITY: SOCIAL INSECURITY
WITHIN THE LAST YEAR, HAVE YOU BEEN KICKED, HIT, SLAPPED, OR OTHERWISE PHYSICALLY HURT BY YOUR PARTNER OR EX-PARTNER?: NO

## 2019-02-22 SDOH — HEALTH STABILITY: PHYSICAL HEALTH: ON AVERAGE, HOW MANY DAYS PER WEEK DO YOU ENGAGE IN MODERATE TO STRENUOUS EXERCISE (LIKE A BRISK WALK)?: 4 DAYS

## 2019-02-22 SDOH — HEALTH STABILITY: MENTAL HEALTH
STRESS IS WHEN SOMEONE FEELS TENSE, NERVOUS, ANXIOUS, OR CAN'T SLEEP AT NIGHT BECAUSE THEIR MIND IS TROUBLED. HOW STRESSED ARE YOU?: NOT AT ALL

## 2019-02-22 SDOH — SOCIAL STABILITY: SOCIAL INSECURITY: WITHIN THE LAST YEAR, HAVE YOU BEEN AFRAID OF YOUR PARTNER OR EX-PARTNER?: NO

## 2019-02-22 SDOH — HEALTH STABILITY: MENTAL HEALTH: HOW OFTEN DO YOU HAVE A DRINK CONTAINING ALCOHOL?: 2-3 TIMES A WEEK

## 2019-02-22 SDOH — SOCIAL STABILITY: SOCIAL NETWORK
DO YOU BELONG TO ANY CLUBS OR ORGANIZATIONS SUCH AS CHURCH GROUPS UNIONS, FRATERNAL OR ATHLETIC GROUPS, OR SCHOOL GROUPS?: YES

## 2019-02-22 SDOH — ECONOMIC STABILITY: FOOD INSECURITY: WITHIN THE PAST 12 MONTHS, THE FOOD YOU BOUGHT JUST DIDN'T LAST AND YOU DIDN'T HAVE MONEY TO GET MORE.: NEVER TRUE

## 2019-02-22 SDOH — ECONOMIC STABILITY: FOOD INSECURITY: WITHIN THE PAST 12 MONTHS, YOU WORRIED THAT YOUR FOOD WOULD RUN OUT BEFORE YOU GOT MONEY TO BUY MORE.: NEVER TRUE

## 2019-02-22 SDOH — SOCIAL STABILITY: SOCIAL NETWORK
IN A TYPICAL WEEK, HOW MANY TIMES DO YOU TALK ON THE PHONE WITH FAMILY, FRIENDS, OR NEIGHBORS?: MORE THAN THREE TIMES A WEEK

## 2019-02-22 SDOH — HEALTH STABILITY: MENTAL HEALTH: HOW OFTEN DO YOU HAVE 6 OR MORE DRINKS ON ONE OCCASION?: NEVER

## 2019-02-22 SDOH — SOCIAL STABILITY: SOCIAL INSECURITY
WITHIN THE LAST YEAR, HAVE TO BEEN RAPED OR FORCED TO HAVE ANY KIND OF SEXUAL ACTIVITY BY YOUR PARTNER OR EX-PARTNER?: NO

## 2019-02-22 SDOH — SOCIAL STABILITY: SOCIAL NETWORK: ARE YOU MARRIED, WIDOWED, DIVORCED, SEPARATED, NEVER MARRIED, OR LIVING WITH A PARTNER?: DIVORCED

## 2019-02-22 SDOH — SOCIAL STABILITY: SOCIAL INSECURITY: WITHIN THE LAST YEAR, HAVE YOU BEEN HUMILIATED OR EMOTIONALLY ABUSED IN OTHER WAYS BY YOUR PARTNER OR EX-PARTNER?: NO

## 2019-02-22 SDOH — ECONOMIC STABILITY: INCOME INSECURITY: HOW HARD IS IT FOR YOU TO PAY FOR THE VERY BASICS LIKE FOOD, HOUSING, MEDICAL CARE, AND HEATING?: NOT HARD AT ALL

## 2019-02-22 SDOH — ECONOMIC STABILITY: TRANSPORTATION INSECURITY
IN THE PAST 12 MONTHS, HAS LACK OF TRANSPORTATION KEPT YOU FROM MEETINGS, WORK, OR FROM GETTING THINGS NEEDED FOR DAILY LIVING?: NO

## 2019-02-22 SDOH — SOCIAL STABILITY: SOCIAL NETWORK: HOW OFTEN DO YOU ATTENT MEETINGS OF THE CLUB OR ORGANIZATION YOU BELONG TO?: MORE THAN 4 TIMES PER YEAR

## 2019-02-22 SDOH — SOCIAL STABILITY: SOCIAL NETWORK: HOW OFTEN DO YOU GET TOGETHER WITH FRIENDS OR RELATIVES?: THREE TIMES A WEEK

## 2019-02-22 SDOH — ECONOMIC STABILITY: TRANSPORTATION INSECURITY
IN THE PAST 12 MONTHS, HAS THE LACK OF TRANSPORTATION KEPT YOU FROM MEDICAL APPOINTMENTS OR FROM GETTING MEDICATIONS?: NO

## 2019-02-22 ASSESSMENT — MIFFLIN-ST. JEOR: SCORE: 1766.92

## 2019-02-22 NOTE — PROGRESS NOTES
SUBJECTIVE:   CC: Garrett Childers is an 63 year old male who presents for preventive health visit.     Healthy Habits:  Answers for HPI/ROS submitted by the patient on 2/20/2019   Annual Exam:  Frequency of exercise:: 4-5 days/week  Getting at least 3 servings of Calcium per day:: Yes  Diet:: Regular (no restrictions), Low fat/cholesterol  Taking medications regularly:: Yes  Medication side effects:: Not applicable  Bi-annual eye exam:: Yes  Dental care twice a year:: Yes  Sleep apnea or symptoms of sleep apnea:: None  abdominal pain: No  Blood in stool: No  Blood in urine: No  chest pain: No  chills: No  congestion: No  constipation: No  cough: No  diarrhea: No  dizziness: No  ear pain: No  eye pain: No  nervous/anxious: No  fever: No  frequency: Yes  genital sores: No  headaches: No  hearing loss: No  heartburn: No  arthralgias: No  joint swelling: No  peripheral edema: No  mood changes: No  myalgias: No  nausea: No  dysuria: No  palpitations: No  Skin sensation changes: No  sore throat: No  urgency: No  rash: No  shortness of breath: No  visual disturbance: No  weakness: No  impotence: No  penile discharge: No  Additional concerns today:: No  Duration of exercise:: 30-45 minutes    Urinary frequency  He is taking Cialis 5 mg daily which helps improve urinary frequency and urine stream flow.     Hypertension    BP Readings from Last 3 Encounters:   02/22/19 122/70   02/16/18 120/70   12/04/17 122/68     BP controlled with triamterene 37.5 mg daily and hydrochlorothiazide 25 mg daily.     Past/recent records reviewed and discussed for:  -Reviewed and updated medical hx, medications, social hx, family hx, and immunizations.           Today's PHQ-2 Score:   PHQ-2 ( 1999 [employer]) 2/20/2019 2/13/2018   Q1: Little interest or pleasure in doing things 0 0   Q2: Feeling down, depressed or hopeless 0 0   PHQ-2 Score 0 0   Q1: Little interest or pleasure in doing things Not at all Not at all   Q2: Feeling down, depressed or  hopeless Not at all Not at all   PHQ-2 Score 0 0     Abuse: Current or Past(Physical, Sexual or Emotional)- No  Do you feel safe in your environment? Yes    Social History     Tobacco Use     Smoking status: Passive Smoke Exposure - Never Smoker     Smokeless tobacco: Former User     Tobacco comment: cigar, rarely   Substance Use Topics     Alcohol use: Yes     Frequency: 2-3 times a week     Drinks per session: 1 or 2     Binge frequency: Never     Comment: <6 drinks/week. May have 2 beers or one mixed drinks on a given day     If you drink alcohol do you typically have >3 drinks per day or >7 drinks per week? No                      Last PSA:   PSA   Date Value Ref Range Status   02/16/2018 3.84 0 - 4 ug/L Final     Comment:     Assay Method:  Chemiluminescence using Siemens Vista analyzer     Reviewed orders with patient. Reviewed health maintenance and updated orders accordingly - Yes  Labs reviewed in EPIC    Reviewed and updated as needed this visit by clinical staff  Tobacco  Allergies  Meds  Med Hx  Surg Hx  Fam Hx  Soc Hx        Reviewed and updated as needed this visit by Provider          ROS:  CONSTITUTIONAL: NEGATIVE for fever, chills, change in weight  INTEGUMENTARY/SKIN: NEGATIVE for worrisome rashes, moles or lesions  EYES: NEGATIVE for vision changes or irritation  ENT: NEGATIVE for ear, mouth and throat problems  RESP: NEGATIVE for significant cough or SOB  CV: NEGATIVE for chest pain, palpitations or peripheral edema  GI: NEGATIVE for nausea, abdominal pain, heartburn, or change in bowel habits   male: POSITIVE for urinary frequency, slowing of urinary stream, and ED negative for dysuria, hematuria, urethral discharge  MUSCULOSKELETAL: NEGATIVE for significant arthralgias or myalgia  NEURO: NEGATIVE for weakness, dizziness or paresthesias  PSYCHIATRIC: NEGATIVE for changes in mood or affect    This document serves as a record of the services and decisions personally performed and made by  "Bong Vanegas MD. It was created on his behalf by Sheree Jasso, a trained medical scribe. The creation of this document is based on the provider's statements to the medical scribe.  Sheree Jasso February 22, 2019 10:05 AM     OBJECTIVE:   /70 (BP Location: Left arm, Patient Position: Sitting, Cuff Size: Adult Large)   Pulse 72   Temp 97.6  F (36.4  C) (Oral)   Resp 16   Ht 1.82 m (5' 11.65\")   Wt 93.4 kg (206 lb)   SpO2 98%   BMI 28.21 kg/m       EXAM:  GENERAL: healthy, alert and no distress  EYES: Eyes grossly normal to inspection, PERRL and conjunctivae and sclerae normal  HENT: ear canals and TM's normal, nose and mouth without ulcers or lesions  NECK: no adenopathy, no asymmetry, masses, or scars and thyroid normal to palpation  RESP: lungs clear to auscultation - no rales, rhonchi or wheezes  CV: regular rate and rhythm, normal S1 S2, no S3 or S4, no murmur, click or rub, no peripheral edema and peripheral pulses strong  ABDOMEN: soft, nontender, no hepatosplenomegaly, no masses and bowel sounds normal   (male): normal male genitalia without lesions or urethral discharge, no hernia  RECTAL: normal sphincter tone, no rectal masses, prostate normal size, smooth, nontender without nodules or masses  MS: no gross musculoskeletal defects noted, no edema  SKIN: no suspicious lesions or rashes  NEURO: Normal strength and tone, mentation intact and speech normal  PSYCH: mentation appears normal, affect normal/bright    Diagnostic Test Results:  Results for orders placed or performed in visit on 02/22/19 (from the past 24 hour(s))   CBC with platelets differential   Result Value Ref Range    WBC 4.3 4.0 - 11.0 10e9/L    RBC Count 5.04 4.4 - 5.9 10e12/L    Hemoglobin 15.7 13.3 - 17.7 g/dL    Hematocrit 47.1 40.0 - 53.0 %    MCV 94 78 - 100 fl    MCH 31.2 26.5 - 33.0 pg    MCHC 33.3 31.5 - 36.5 g/dL    RDW 13.3 10.0 - 15.0 %    Platelet Count 169 150 - 450 10e9/L    % Neutrophils 39.0 %    % " "Lymphocytes 41.2 %    % Monocytes 12.6 %    % Eosinophils 6.5 %    % Basophils 0.7 %    Absolute Neutrophil 1.7 1.6 - 8.3 10e9/L    Absolute Lymphocytes 1.8 0.8 - 5.3 10e9/L    Absolute Monocytes 0.5 0.0 - 1.3 10e9/L    Absolute Eosinophils 0.3 0.0 - 0.7 10e9/L    Absolute Basophils 0.0 0.0 - 0.2 10e9/L    Diff Method Automated Method      ASSESSMENT/PLAN:   (Z00.00) Annual physical exam  (primary encounter diagnosis)  Comment: Stable health. See epic orders.  Plan: Comprehensive metabolic panel, Lipid panel         reflex to direct LDL Fasting, TSH with free T4         reflex, CBC with platelets differential,         Prostate spec antigen screen          (R39.198) Slowing of urinary stream  Comment: Improved with cialis. Refilled rx.   Plan: tadalafil (CIALIS) 5 MG tablet          (I10) Essential hypertension with goal blood pressure less than 140/90  Comment: BP at target. Continue current meds.  Plan: triamterene-HCTZ (MAXZIDE-25) 37.5-25 MG tablet          Everything looks fine!    Refills of medications have been faxed to your pharmacy.     I'll get back to you with lab results soon, especially if there is anything of concern.      See you in a year, sooner if problems.        COUNSELING:  Reviewed preventive health counseling, as reflected in patient instructions       Regular exercise       Healthy diet/nutrition       Colon cancer screening       Prostate cancer screening    BP Readings from Last 1 Encounters:   02/22/19 122/70     Estimated body mass index is 28.21 kg/m  as calculated from the following:    Height as of this encounter: 1.82 m (5' 11.65\").    Weight as of this encounter: 93.4 kg (206 lb).  Weight management plan: Discussed healthy diet and exercise guidelines   reports that he is a non-smoker but has been exposed to tobacco smoke. He quit smokeless tobacco use about 14 years ago.    Counseling Resources:  ATP IV Guidelines  Pooled Cohorts Equation Calculator  FRAX Risk Assessment  ICSI " Preventive Guidelines  Dietary Guidelines for Americans, 2010  USDA's MyPlate  ASA Prophylaxis  Lung CA Screening      The information in this document, created by the medical scribe for me, accurately reflects the services I personally performed and the decisions made by me. I have reviewed and approved this document for accuracy prior to leaving the patient care area.  February 22, 2019 10:28 AM    Bong Vanegas MD,   [facility]

## 2019-02-22 NOTE — PATIENT INSTRUCTIONS
Preventive Health Recommendations  Male Ages 50 - 64    Yearly exam:             See your health care provider every year in order to  o   Review health changes.   o   Discuss preventive care.    o   Review your medicines if your doctor has prescribed any.     Have a cholesterol test every 5 years, or more frequently if you are at risk for high cholesterol/heart disease.     Have a diabetes test (fasting glucose) every three years. If you are at risk for diabetes, you should have this test more often.     Have a colonoscopy at age 50, or have a yearly FIT test (stool test). These exams will check for colon cancer.      Talk with your health care provider about whether or not a prostate cancer screening test (PSA) is right for you.    You should be tested each year for STDs (sexually transmitted diseases), if you re at risk.     Shots: Get a flu shot each year. Get a tetanus shot every 10 years.     Nutrition:    Eat at least 5 servings of fruits and vegetables daily.     Eat whole-grain bread, whole-wheat pasta and brown rice instead of white grains and rice.     Get adequate Calcium and Vitamin D.     Lifestyle    Exercise for at least 150 minutes a week (30 minutes a day, 5 days a week). This will help you control your weight and prevent disease.     Limit alcohol to one drink per day.     No smoking.     Wear sunscreen to prevent skin cancer.     See your dentist every six months for an exam and cleaning.     See your eye doctor every 1 to 2 years.      Everything looks fine!    Refills of medications have been faxed to your pharmacy.     I'll get back to you with lab results soon, especially if there is anything of concern.      See you in a year, sooner if problems.

## 2019-07-25 ENCOUNTER — TELEPHONE (OUTPATIENT)
Dept: INTERNAL MEDICINE | Facility: CLINIC | Age: 64
End: 2019-07-25

## 2019-07-25 DIAGNOSIS — N40.0 BPH (BENIGN PROSTATIC HYPERPLASIA): Primary | ICD-10-CM

## 2019-07-25 DIAGNOSIS — N40.1 BENIGN PROSTATIC HYPERPLASIA WITH WEAK URINARY STREAM: ICD-10-CM

## 2019-07-25 DIAGNOSIS — R39.12 BENIGN PROSTATIC HYPERPLASIA WITH WEAK URINARY STREAM: ICD-10-CM

## 2019-07-25 DIAGNOSIS — R39.198 SLOWING OF URINARY STREAM: ICD-10-CM

## 2019-07-25 NOTE — LETTER
[facility]  [address] [place] [address]  [phone]    2019    Regarding:  Garrett Childers  [address]  [facility] [address]  : 1955            To Whom It May Concern     Garrett Childers has been prescribed tadalafil 5 mg once daily due to benign hyperplasia of prostate with weak urinary stream (N40.1).      If you have any further questions or problems, please contact our office.    Sincerely,        Bong Vanegas MD

## 2019-07-25 NOTE — TELEPHONE ENCOUNTER
Central Prior Authorization Team   Phone: [phone]    PA Initiation    Medication: tadalafil (CIALIS) 5 MG tablet  Insurance Company: [employer] - Phone [phone] Fax [phone]  Pharmacy Filling the Rx: [facility] - [place], [place] - [address] [place] [address]  Filling Pharmacy Phone: [phone]  Filling Pharmacy Fax:    Start Date: 7/25/2019    Received prior authorization form from [employer] . Form filled out and faxed back to insurance at [phone].

## 2019-07-30 NOTE — TELEPHONE ENCOUNTER
Central Prior Authorization Team   Phone: [phone]    PRIOR AUTHORIZATION DENIED    Medication: tadalafil (CIALIS) 5 MG tablet-Denied-    Denial Date: 7/26/2019    Denial Rational: Coverage is provided for erectile dysfunction (ED), pulmonary arterial hypertension (PAH), Raynaud's Phenomenon, benign prostatic hyperplasia (BPH), prophylaxis after radical prostatectomy (early penile rehabilitation), and high-altitude pulmonary edema (HAPE), treatment or prevention (for prevention must have a history of HAPE). Coverage cannot be provided at this time. Other coverage conditions may apply.        Appeal Information: If you would like to appeal this decision we would need a letter of medical necessity in order to begin the appeal process. The sooner the letter is written, and we are notified the letter is in the patient's chart, the sooner we can get the appeal initiated. Some appeals can take up to 30 days to be completed.

## 2019-08-01 NOTE — TELEPHONE ENCOUNTER
"Primary care provider, do you feel as if patient meets any of this criteria:  \"Denial Rational: Coverage is provided for erectile dysfunction (ED), pulmonary arterial hypertension (PAH), Raynaud's Phenomenon, benign prostatic hyperplasia (BPH), prophylaxis after radical prostatectomy (early penile rehabilitation), and high-altitude pulmonary edema (HAPE), treatment or prevention (for prevention must have a history of HAPE)\"    Per chart, patient takes this daily for \"slowing of urinary stream\"  "

## 2019-08-02 NOTE — TELEPHONE ENCOUNTER
Patient reports that he takes this for BPH.   Cialis order pended with new diagnosis of BPH (ordered as no print).     Dr. Vanegas - please review if okay for change of diagnosis and if so, provide letter of appeal for that patient uses Cialis for BPH.

## 2019-08-04 RX ORDER — TADALAFIL 5 MG/1
5 TABLET ORAL DAILY
Qty: 90 TABLET | Refills: 1
Start: 2019-08-04 | End: 2019-08-07

## 2019-08-07 DIAGNOSIS — R39.12 BENIGN PROSTATIC HYPERPLASIA WITH WEAK URINARY STREAM: ICD-10-CM

## 2019-08-07 DIAGNOSIS — N40.1 BENIGN PROSTATIC HYPERPLASIA WITH WEAK URINARY STREAM: ICD-10-CM

## 2019-08-07 NOTE — TELEPHONE ENCOUNTER
"Requested Prescriptions   Pending Prescriptions Disp Refills     tadalafil (CIALIS) 5 MG tablet [Pharmacy Med Name: TADALAFIL (CL) TABS 5MG] 90 tablet 0     Sig: TAKE 1 TABLET DAILY   Last Written Prescription Date:  08/04/2019  Last Fill Quantity: 90,  # refills: 01   Last office visit: 2/22/2019 with prescribing provider:     Future Office Visit:      Erectile Dysfuction Protocol Passed - 8/7/2019 10:19 AM        Passed - Absence of nitrates on medication list        Passed - Absence of Alpha Blockers on Med list        Passed - Recent (12 mo) or future (30 days) visit within the authorizing provider's specialty     Patient had office visit in the last 12 months or has a visit in the next 30 days with authorizing provider or within the authorizing provider's specialty.  See \"Patient Info\" tab in inbasket, or \"Choose Columns\" in Meds & Orders section of the refill encounter.              Passed - Medication is active on med list        Passed - Patient is age 18 or older        "

## 2019-08-08 RX ORDER — TADALAFIL 5 MG/1
TABLET ORAL
Qty: 90 TABLET | Refills: 1 | Status: SHIPPED | OUTPATIENT
Start: 2019-08-08 | End: 2020-03-06

## 2019-08-12 NOTE — TELEPHONE ENCOUNTER
Central Prior Authorization Team   Phone: [phone]    Medication Appeal Initiation    We have initiated an appeal for the requested medication:  Medication: tadalafil (CIALIS) 5 MG tablet-Denied-Appeal Initiated-  Appeal Start Date:  8/12/2019  Insurance Company: [employer] - Phone [phone] Fax [phone]  Comments:  Letter of Medical Necessity printed from patient's chart, as well as the denial letter sent by [employer], and both were faxed to [employer] at [phone], marked as an urgent appeal.

## 2019-09-29 ENCOUNTER — HEALTH MAINTENANCE LETTER (OUTPATIENT)
Age: 64
End: 2019-09-29

## 2020-01-31 ENCOUNTER — OFFICE VISIT (OUTPATIENT)
Dept: PODIATRY | Facility: CLINIC | Age: 65
End: 2020-01-31
Payer: COMMERCIAL

## 2020-01-31 ENCOUNTER — HOSPITAL ENCOUNTER (OUTPATIENT)
Dept: MRI IMAGING | Facility: CLINIC | Age: 65
Discharge: HOME | End: 2020-01-31
Attending: PODIATRIST | Admitting: PODIATRIST
Payer: COMMERCIAL

## 2020-01-31 VITALS
BODY MASS INDEX: 27.22 KG/M2 | DIASTOLIC BLOOD PRESSURE: 70 MMHG | HEIGHT: 72 IN | SYSTOLIC BLOOD PRESSURE: 114 MMHG | WEIGHT: 201 LBS

## 2020-01-31 DIAGNOSIS — B07.0 PLANTAR WART, RIGHT FOOT: ICD-10-CM

## 2020-01-31 DIAGNOSIS — M76.71 PERONEAL TENDONITIS, RIGHT: Primary | ICD-10-CM

## 2020-01-31 DIAGNOSIS — M76.71 PERONEAL TENDONITIS, RIGHT: ICD-10-CM

## 2020-01-31 PROCEDURE — 99204 OFFICE O/P NEW MOD 45 MIN: CPT | Performed by: PODIATRIST

## 2020-01-31 PROCEDURE — 73721 MRI JNT OF LWR EXTRE W/O DYE: CPT | Mod: RT

## 2020-01-31 RX ORDER — ZINC SULFATE 50(220)MG
CAPSULE ORAL
Qty: 60 CAPSULE | Refills: 2 | Status: SHIPPED | OUTPATIENT
Start: 2020-01-31 | End: 2020-03-06

## 2020-01-31 ASSESSMENT — MIFFLIN-ST. JEOR: SCORE: 1739.17

## 2020-01-31 NOTE — PATIENT INSTRUCTIONS
Thank you for choosing [facility]!    DR. SANDOVAL'S CLINIC LOCATIONS:   MONDAY - [place] TUESDAY AM - [place]   [address]  [address]   [place] [address] [place] [address]   [phone]       THURSDAY AM - [place] THURSDAY PM - [place]   [address]   [place] [address] [place] [address]   [phone]       FRIDAY AM - [place] SET UP SURGERY: [phone] [address] APPOINTMENTS: [phone]   [place] [address] BILLING QUESTIONS: [phone] FAX NUMBER: [phone]       [facility] SCHEDULING  They should be calling you within 24 hours to schedule your scan.  If not, please call the location discussed at your appointment.    1) [facility]:       [phone]      [address] [place] [address]    2) [facility]:      [phone] [address] [place] [address]    3) [facility] [place] [facility]:       [phone]      [address] [place] [address]    * We will call you with the results. Please allow 24-48 hours for the results to be read and final.      CALLUS / CORN / IPK CARE  When there is excessive friction or pressure on the skin, the body responds by making the skin thicker to protect the deeper structures from becoming exposed. While this works well to protect the deeper structures, the thickened skin can cause increased pressure and pain.    Callus: flat, diffuse thickened areas are simple calluses and they are usually caused by friction. Often these are the result of rubbing on a shoe or from going barefoot.    Corns: calluses with a central core on or between the toes are called corns. These result from prominent joints on toes rubbing together. Theses are a symptom of bone malalignment or illfitting shoes and will always recur unless the underlying bones are  addressed surgically.    IPK: calluses with a central core on the ball of the foot are usually IPKs (intractable plantar keratosis). These are caused by excessive pressure from the metatarsals, the bones that make up the ball of the foot. Often one of these bones is too long or too prominent. Again, these will always recur unless the underlying bone issue is addressed. There is no cure for these. They will either go away by themselves, recur, or more could develop.    TREATMENT RECOMENDATIONS  - File: Trim them down with a pumice stone or callus file a couple times a week to remove callus tissue that builds up. An electric callus removing device. Amope Pedi Perfect Electronic Pedicure Foot File and Callus Remover can be a good option.   - Moisturize: Lotion can be applied to soften the callus. A lactic acid or urea based cream such as Carmol, Kersal or Vanicream or thicker cream with shea butter are good options.   - Foot Gear: Good supportive shoes and minimizing barefoot walking can slow down callus formation and can decrease pain levels. Gel inserts can also provide padding to the bottom of the foot to prevent pain and slow recurrence. Toe spacers, toe covers, can custom orthotic inserts can be beneficial as well.  - Surgery: If there is a surgical pathology noted, such as a prominent bone, often this needs to be addressed surgically to minimize recurrence. However, sometimes the lesion simply migrates to another spot after surgery, so it is not a guaranteed cure.             TOE COVERS/CAPS

## 2020-01-31 NOTE — PROGRESS NOTES
"Foot & Ankle Surgery  January 31, 2020    CC: \"L corn, ankle pain R\"    I was asked to see Garrett Childers regarding the chief complaint by:  Dr Vanegas    HPI:  Pt is a 64 year old male who presents with above complaint.  Above issues x 1-5 years.  Looking for diagnosis - treatment plan.  \"persistent corn; ankle pain\".  2/10 \"evercise, day of walking\", worse with nothing noted.  R ankle \"chronically sore\", if going faster than a walk.  Sore with increased activities.  Injuries \"way back when \"playing football and turned ankle severely in a pothole.  Was seen 3 years ago at [facility], told may need surgery.  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  Had surgery, went away but 3-4 months ago it came back.     ROS:   Pos for CC.  The patient denies current nausea, vomiting, chills, fevers, belly pain, calf pain, chest pain or SOB.  Complete remainder of ROS is otherwise neg.    VITALS:    Vitals:    01/31/20 0835   BP: 114/70   Weight: 91.2 kg (201 lb)   Height: 1.82 m (5' 11.65\")       PMH:    Past Medical History:   Diagnosis Date     DDD (degenerative disc disease), lumbar      Unspecified essential hypertension        SXHX:    Past Surgical History:   Procedure Laterality Date     C NONSPECIFIC PROCEDURE  ~1977    Left knee surg for osteochondroma     C NONSPECIFIC PROCEDURE  10/7/2015    Sebaceious cyst removal, left upper arm     COLONOSCOPY  11/29/2007    Normal, repeat in 2017     COLONOSCOPY N/A 12/4/2017    Int Hemorrhoids, o/w normal. Procedure: COLONOSCOPY;  Colonoscopy;  Surgeon: Angeles Vyas MD;  Location:  GI     HERNIORRHAPHY INGUINAL Left 4/13/2015    Procedure: HERNIORRHAPHY INGUINAL;  Surgeon: Tyler Michael MD;  Location:  OR     ORTHOPEDIC SURGERY      corn shaved from left foot        MEDS:    Current Outpatient Medications   Medication     aspirin 81 MG tablet     tadalafil (CIALIS) 20 MG tablet     tadalafil (CIALIS) 5 MG tablet     triamterene-HCTZ (MAXZIDE-25) 37.5-25 MG tablet     No " current facility-administered medications for this visit.        ALL:     Allergies   Allergen Reactions     Lisinopril      Cough.       FMH:    Family History   Problem Relation Age of Onset     Cancer Daughter 15        PTLD B-cell large-cell Non-Hodgkin's,  at 16     Family History Negative Mother         Born [birth year]. Lives in independent living.      Eye Disorder Father         Macular degeneration.      Prostate Cancer Father      Hypertension Father      Neurologic Disorder Father          age 84. Alzheimers, also depression     Depression Father      Arthritis Sister         Born      Heart Disease Daughter 0        twin born  heart transplant 3 months old CVA 1 /1 yo     Cerebrovascular Disease Daughter 1     Family History Negative Son              Family History Negative Daughter         twin born      Heart Disease Maternal Grandmother          64yo MI     Neurologic Disorder Maternal Grandfather          62yo Parkinson's     Heart Disease Paternal Grandmother          71yo      Family History Negative Paternal Grandfather          93yo      Colon Cancer No family hx of        SocHx:    Social History     Socioeconomic History     Marital status: Single     Spouse name: Tierney     Number of children: 3     Years of education: Not on file     Highest education level: Master's degree (e.g., MA, MS, Anna, MEd, MSW, JOSSE)   Occupational History     Occupation:      Employer: [employer]   Social Needs     Financial resource strain: Not hard at all     Food insecurity:     Worry: Never true     Inability: Never true     Transportation needs:     Medical: No     Non-medical: No   Tobacco Use     Smoking status: Passive Smoke Exposure - Never Smoker     Smokeless tobacco: Former User     Tobacco comment: cigar, rarely   Substance and Sexual Activity     Alcohol use: Yes     Frequency: 2-3 times a week     Drinks per session: 1 or 2      Binge frequency: Never     Comment: <6 drinks/week. May have 2 beers or one mixed drinks on a given day     Drug use: No     Sexual activity: Yes     Partners: Female     Birth control/protection: None     Comment: We old!   Lifestyle     Physical activity:     Days per week: 4 days     Minutes per session: 60 min     Stress: Not at all   Relationships     Social connections:     Talks on phone: More than three times a week     Gets together: Three times a week     Attends Muslim service: Never     Active member of club or organization: Yes     Attends meetings of clubs or organizations: More than 4 times per year     Relationship status:      Intimate partner violence:     Fear of current or ex partner: No     Emotionally abused: No     Physically abused: No     Forced sexual activity: No   Other Topics Concern     Parent/sibling w/ CABG, MI or angioplasty before 65F 55M? No   Social History Narrative    Goes to [employer], 3-4 times/week.            EXAMINATION:  Gen:   No apparent distress  Neuro:   A&Ox3, no deficits  Psych:    Answering questions appropriately for age and situation with normal affect  Head:    NCAT  Eye:    Visual scanning without deficit  Ear:    Response to auditory stimuli wnl  Lung:    Non-labored breathing on RA noted  Abd:    NTND per patient report  Lymph:    Neg for pitting/non-pitting edema BLE  Vasc:    Pulses palpable, CFT minimally delayed  Neuro:    Light touch sensation intact to all sensory nerve distributions without paresthesias  Derm:    Adjacent calluess medial L 5th and lateral L 4th toes.  Plantar wart R forefoot.    MSK:    right lower extremity - pain over lateral ankle, CFL and peroneals.  No anterior gutter pain.  Guarding with inversion.  No sinus tarsi pain.    Calf:    Neg for redness, swelling or tenderness      Assessment:  64 year old male with chronic right ankle pain with history of inversion injuries; painful calluses L 4th and 5th toes sp  previous surgery; plantar wart R forefoot      Plan:  Discussed etiologies, anatomy and options  1.  Chronic right ankle pain with history of inversion injuries  -conservatively, discussed brace/boot, RICE/NSAID vs tylenol, physical therapy  -MRI ordered to assess soft tissue, based on chronicity of symptoms, will call  -surgically, discussed debridement/repair of structurally-compromised tendon/ligament, including generic post-op course    2.  Calluses L 4th and 5th toes  -We discussed that many hyper-keratotic lesions are caused by pressure or shearing forces on the skin, and these can often be treated sufficiently with shoes, inserts and local tissue debridement.  Some lesions, however, can have a deep core, and while home treatments are helpful, occasional clinical debridement may be necessary.  In certain cases, surgical excision may be required if no other treatments have proven sufficient.  -Our home instruction handout was dispensed, detailing home therapies to minimize regrowth of the hyperkeratotic tissue.    -we discussed a rough estimate of the cost of debridement in clinic, and how insurance may not cover the cost meaning the patient may be responsible.    -toe cap/spacer handout  -surgically, discussed options, including hemiphalagectomy, derotation arthroplasty, etc.  -debrided 2 calluses with 15 blade without issue, no charge.    3.  Plantar wart R forefoot < 15 lesions  -debrided with 15 blade  -discussed clinic treatment options; deferred today  -Rx for home medications x 2, PO zinc sulfate and topical mediplast      Follow up:  prn or sooner with acute issues      Patient's medical history was reviewed today    Body mass index is 27.53 kg/m .  Weight management plan: Patient was referred to their PCP to discuss a diet and exercise plan.        Avery Lemus DPM FACFAS FACFAOM  Podiatric Foot & Ankle Surgeon  [facility]  [phone]

## 2020-01-31 NOTE — LETTER
"    1/31/2020         RE: Garrett Childers  [address] [address] [address]        Dear Colleague,    Thank you for referring your patient, Garrett Childers, to the [facility]. Please see a copy of my visit note below.    Foot & Ankle Surgery  January 31, 2020    CC: \"L corn, ankle pain R\"    I was asked to see Garrett Childers regarding the chief complaint by:  Dr Vanegas    HPI:  Pt is a 64 year old male who presents with above complaint.  Above issues x 1-5 years.  Looking for diagnosis - treatment plan.  \"persistent corn; ankle pain\".  2/10 \"evercise, day of walking\", worse with nothing noted.  R ankle \"chronically sore\", if going faster than a walk.  Sore with increased activities.  Injuries \"way back when \"playing football and turned ankle severely in a pothole.  Was seen 3 years ago at [facility], told may need surgery.  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  Had surgery, went away but 3-4 months ago it came back.     ROS:   Pos for CC.  The patient denies current nausea, vomiting, chills, fevers, belly pain, calf pain, chest pain or SOB.  Complete remainder of ROS is otherwise neg.    VITALS:    Vitals:    01/31/20 0835   BP: 114/70   Weight: 91.2 kg (201 lb)   Height: 1.82 m (5' 11.65\")       PMH:    Past Medical History:   Diagnosis Date     DDD (degenerative disc disease), lumbar      Unspecified essential hypertension        SXHX:    Past Surgical History:   Procedure Laterality Date     C NONSPECIFIC PROCEDURE  ~1977    Left knee surg for osteochondroma     C NONSPECIFIC PROCEDURE  10/7/2015    Sebaceious cyst removal, left upper arm     COLONOSCOPY  11/29/2007    Normal, repeat in 2017     COLONOSCOPY N/A 12/4/2017    Int Hemorrhoids, o/w normal. Procedure: COLONOSCOPY;  Colonoscopy;  Surgeon: Angeles Vyas MD;  Location:  GI     HERNIORRHAPHY INGUINAL Left 4/13/2015    Procedure: HERNIORRHAPHY INGUINAL;  Surgeon: Tyler Michael MD;  Location:  OR     ORTHOPEDIC SURGERY      corn " shaved from left foot        MEDS:    Current Outpatient Medications   Medication     aspirin 81 MG tablet     tadalafil (CIALIS) 20 MG tablet     tadalafil (CIALIS) 5 MG tablet     triamterene-HCTZ (MAXZIDE-25) 37.5-25 MG tablet     No current facility-administered medications for this visit.        ALL:     Allergies   Allergen Reactions     Lisinopril      Cough.       FMH:    Family History   Problem Relation Age of Onset     Cancer Daughter 15        PTLD B-cell large-cell Non-Hodgkin's,  at 16     Family History Negative Mother         Born [birth year]. Lives in independent living.      Eye Disorder Father         Macular degeneration.      Prostate Cancer Father      Hypertension Father      Neurologic Disorder Father          age 84. Alzheimers, also depression     Depression Father      Arthritis Sister         Born      Heart Disease Daughter 0        twin born  heart transplant 3 months old CVA 1 /1 yo     Cerebrovascular Disease Daughter 1     Family History Negative Son              Family History Negative Daughter         twin born      Heart Disease Maternal Grandmother          64yo MI     Neurologic Disorder Maternal Grandfather          64yo Parkinson's     Heart Disease Paternal Grandmother          71yo      Family History Negative Paternal Grandfather          93yo      Colon Cancer No family hx of        SocHx:    Social History     Socioeconomic History     Marital status: Single     Spouse name: Tierney     Number of children: 3     Years of education: Not on file     Highest education level: Master's degree (e.g., MA, MS, Anna, MEd, MSW, JOSSE)   Occupational History     Occupation:      Employer:  [employer]   Social Needs     Financial resource strain: Not hard at all     Food insecurity:     Worry: Never true     Inability: Never true     Transportation needs:     Medical: No     Non-medical: No   Tobacco Use     Smoking  status: Passive Smoke Exposure - Never Smoker     Smokeless tobacco: Former User     Tobacco comment: cigar, rarely   Substance and Sexual Activity     Alcohol use: Yes     Frequency: 2-3 times a week     Drinks per session: 1 or 2     Binge frequency: Never     Comment: <6 drinks/week. May have 2 beers or one mixed drinks on a given day     Drug use: No     Sexual activity: Yes     Partners: Female     Birth control/protection: None     Comment: We old!   Lifestyle     Physical activity:     Days per week: 4 days     Minutes per session: 60 min     Stress: Not at all   Relationships     Social connections:     Talks on phone: More than three times a week     Gets together: Three times a week     Attends Mandaeism service: Never     Active member of club or organization: Yes     Attends meetings of clubs or organizations: More than 4 times per year     Relationship status:      Intimate partner violence:     Fear of current or ex partner: No     Emotionally abused: No     Physically abused: No     Forced sexual activity: No   Other Topics Concern     Parent/sibling w/ CABG, MI or angioplasty before 65F 55M? No   Social History Narrative    Goes to [employer], 3-4 times/week.            EXAMINATION:  Gen:   No apparent distress  Neuro:   A&Ox3, no deficits  Psych:    Answering questions appropriately for age and situation with normal affect  Head:    NCAT  Eye:    Visual scanning without deficit  Ear:    Response to auditory stimuli wnl  Lung:    Non-labored breathing on RA noted  Abd:    NTND per patient report  Lymph:    Neg for pitting/non-pitting edema BLE  Vasc:    Pulses palpable, CFT minimally delayed  Neuro:    Light touch sensation intact to all sensory nerve distributions without paresthesias  Derm:    Adjacent calluess medial L 5th and lateral L 4th toes.  Plantar wart R forefoot.    MSK:    right lower extremity - pain over lateral ankle, CFL and peroneals.  No anterior gutter pain.  Guarding  with inversion.  No sinus tarsi pain.    Calf:    Neg for redness, swelling or tenderness      Assessment:  64 year old male with chronic right ankle pain with history of inversion injuries; painful calluses L 4th and 5th toes sp previous surgery; plantar wart R forefoot      Plan:  Discussed etiologies, anatomy and options  1.  Chronic right ankle pain with history of inversion injuries  -conservatively, discussed brace/boot, RICE/NSAID vs tylenol, physical therapy  -MRI ordered to assess soft tissue, based on chronicity of symptoms, will call  -surgically, discussed debridement/repair of structurally-compromised tendon/ligament, including generic post-op course    2.  Calluses L 4th and 5th toes  -We discussed that many hyper-keratotic lesions are caused by pressure or shearing forces on the skin, and these can often be treated sufficiently with shoes, inserts and local tissue debridement.  Some lesions, however, can have a deep core, and while home treatments are helpful, occasional clinical debridement may be necessary.  In certain cases, surgical excision may be required if no other treatments have proven sufficient.  -Our home instruction handout was dispensed, detailing home therapies to minimize regrowth of the hyperkeratotic tissue.    -we discussed a rough estimate of the cost of debridement in clinic, and how insurance may not cover the cost meaning the patient may be responsible.    -toe cap/spacer handout  -surgically, discussed options, including hemiphalagectomy, derotation arthroplasty, etc.  -debrided 2 calluses with 15 blade without issue, no charge.    3.  Plantar wart R forefoot < 15 lesions  -debrided with 15 blade  -discussed clinic treatment options; deferred today  -Rx for home medications x 2, PO zinc sulfate and topical mediplast      Follow up:  prn or sooner with acute issues      Patient's medical history was reviewed today    Body mass index is 27.53 kg/m .  Weight management plan:  Patient was referred to their PCP to discuss a diet and exercise plan.        Avery Lemus DPM [facility] FACFA  Podiatric Foot & Ankle Surgeon  [facility]  [phone]      Again, thank you for allowing me to participate in the care of your patient.        Sincerely,        Avery Lemus DPM, DPM

## 2020-02-04 ENCOUNTER — TELEPHONE (OUTPATIENT)
Dept: PODIATRY | Facility: CLINIC | Age: 65
End: 2020-02-04

## 2020-02-04 NOTE — TELEPHONE ENCOUNTER
Foot & Ankle Surgery  February 4, 2020    I called and spoke with Kaleb about his MRI results:    Impression:  1. Tenosynovitis of the peroneal tendons at the level of and just  distal to the lateral malleolus. Split-thickness tearing of the  peroneal brevis with distal reconstitution.     2. Significantly attenuated anterior talofibular ligament compatible  with remote sprain. Thickened calcaneofibular ligament also compatible  with remote sprain.      Discussed surgical intervention, including ankle scope with open ligament/tendon repair.  Discussed generic post-op course for patient information/planning.  He will consider his options.    Avery Lemus DPM FACFAS FACFAOM  Podiatric Foot & Ankle Surgeon  [facility]  [phone]

## 2020-02-05 ENCOUNTER — TELEPHONE (OUTPATIENT)
Dept: INTERNAL MEDICINE | Facility: CLINIC | Age: 65
End: 2020-02-05

## 2020-02-05 NOTE — TELEPHONE ENCOUNTER
Prior Authorization Retail Medication Request    Medication/Dose: CIALIS 5 MG  ICD code (if different than what is on RX):    Previously Tried and Failed:    Rationale:      Insurance Name:  [employer]  Insurance ID:  [id number]      Pharmacy Information (if different than what is on RX)  Name:  [facility]  Phone:  [phone]

## 2020-02-10 NOTE — TELEPHONE ENCOUNTER
Central Prior Authorization Team   Phone: [phone]    PA Initiation    Medication: CIALIS  Insurance Company: [employer] - Phone [phone] Fax [phone]  Pharmacy Filling the Rx: [facility] - [address]  Filling Pharmacy Phone: [phone]  Filling Pharmacy Fax: [phone]  Start Date: 2/10/2020

## 2020-02-11 NOTE — TELEPHONE ENCOUNTER
Prior Authorization Approval    Authorization Effective Date: 2/10/2020  Authorization Expiration Date: 2/10/2021  Medication: CIALIS-APPROVED  Approved Dose/Quantity:    Reference #:     Insurance Company: [employer] - Phone [phone] Fax [phone]  Expected CoPay:       CoPay Card Available:      Foundation Assistance Needed:    Which Pharmacy is filling the prescription (Not needed for infusion/clinic administered): [facility] - [address]  Pharmacy Notified: Yes  Patient Notified: Yes  **Instructed pharmacy to notify patient when script is ready to /ship.**

## 2020-02-12 DIAGNOSIS — I10 ESSENTIAL HYPERTENSION WITH GOAL BLOOD PRESSURE LESS THAN 140/90: ICD-10-CM

## 2020-02-13 ENCOUNTER — TELEPHONE (OUTPATIENT)
Dept: INTERNAL MEDICINE | Facility: CLINIC | Age: 65
End: 2020-02-13

## 2020-02-13 RX ORDER — TRIAMTERENE/HYDROCHLOROTHIAZID 37.5-25 MG
1 TABLET ORAL DAILY
Qty: 90 TABLET | Refills: 0 | Status: SHIPPED | OUTPATIENT
Start: 2020-02-13 | End: 2020-03-06

## 2020-02-13 NOTE — TELEPHONE ENCOUNTER
Prior authorization requested for Cialis 5 mg from [facility].    Covermymeds key code: [id number]

## 2020-02-13 NOTE — TELEPHONE ENCOUNTER
Medication is being filled for 1 time refill only due to:  Patient needs to be seen because patient has an upcoming appointment.     Next 5 appointments (look out 90 days)    Mar 06, 2020  8:40 AM CST  PHYSICAL with Bong Vanegas MD  [facility] [address]  [facility] [id number] [phone]

## 2020-02-13 NOTE — TELEPHONE ENCOUNTER
"Requested Prescriptions   Pending Prescriptions Disp Refills     triamterene-HCTZ (MAXZIDE-25) 37.5-25 MG tablet [Pharmacy Med Name: TRIAMT-HCTZ 37.5-25MG TABLET] 90 tablet 3     Sig: TAKE 1 TABLET BY MOUTH  DAILY   Last Written Prescription Date:  02/22/2019  Last Fill Quantity: 90,  # refills: 03   Last office visit: 2/22/2019 with prescribing provider:     Future Office Visit:   Next 5 appointments (look out 90 days)    Mar 06, 2020  8:40 AM CST  [facility]YS[facility] with [facility] [address]  [facility] [id number] [phone]           Diuretics (Including Combos) Protocol Passed - 2/12/2020  7:05 PM        Passed - Blood pressure under 140/90 in past 12 months     BP Readings from Last 3 Encounters:   01/31/20 114/70   02/22/19 122/70   02/16/18 120/70                 Passed - Recent (12 mo) or future (30 days) visit within the authorizing provider's specialty     Patient has had an office visit with the authorizing provider or a provider within the authorizing providers department within the previous 12 mos or has a future within next 30 days. See \"Patient Info\" tab in inbasket, or \"Choose Columns\" in Meds & Orders section of the refill encounter.              Passed - Medication is active on med list        Passed - Patient is age 18 or older        Passed - Normal serum creatinine on file in past 12 months     Recent Labs   Lab Test 02/22/19  0921   CR 0.94              Passed - Normal serum potassium on file in past 12 months     Recent Labs   Lab Test 02/22/19  0921   POTASSIUM 3.4                    Passed - Normal serum sodium on file in past 12 months     Recent Labs   Lab Test 02/22/19  0921                 "

## 2020-02-17 NOTE — TELEPHONE ENCOUNTER
Please see telephone encounter from 02/05/2020- this medication has been approved until 02/10/21- this key was created 14 days ago, before approval information was given to pharmacy.

## 2020-03-01 ASSESSMENT — ENCOUNTER SYMPTOMS
FEVER: 0
DIARRHEA: 0
COUGH: 0
PARESTHESIAS: 0
HEARTBURN: 0
HEADACHES: 0
FREQUENCY: 1
SORE THROAT: 0
SHORTNESS OF BREATH: 0
HEMATURIA: 0
JOINT SWELLING: 0
WEAKNESS: 0
EYE PAIN: 0
CHILLS: 0
NAUSEA: 0
CONSTIPATION: 0
PALPITATIONS: 0
DIZZINESS: 0
ABDOMINAL PAIN: 0
ARTHRALGIAS: 0
DYSURIA: 0
NERVOUS/ANXIOUS: 0
HEMATOCHEZIA: 0
MYALGIAS: 0

## 2020-03-06 ENCOUNTER — OFFICE VISIT (OUTPATIENT)
Dept: INTERNAL MEDICINE | Facility: CLINIC | Age: 65
End: 2020-03-06
Payer: COMMERCIAL

## 2020-03-06 VITALS
OXYGEN SATURATION: 100 % | RESPIRATION RATE: 18 BRPM | HEIGHT: 72 IN | HEART RATE: 68 BPM | TEMPERATURE: 97.9 F | SYSTOLIC BLOOD PRESSURE: 111 MMHG | DIASTOLIC BLOOD PRESSURE: 74 MMHG | BODY MASS INDEX: 27.64 KG/M2 | WEIGHT: 204.1 LBS

## 2020-03-06 DIAGNOSIS — R39.12 BENIGN PROSTATIC HYPERPLASIA WITH WEAK URINARY STREAM: ICD-10-CM

## 2020-03-06 DIAGNOSIS — I10 ESSENTIAL HYPERTENSION WITH GOAL BLOOD PRESSURE LESS THAN 140/90: ICD-10-CM

## 2020-03-06 DIAGNOSIS — R39.198 SLOWING OF URINARY STREAM: ICD-10-CM

## 2020-03-06 DIAGNOSIS — Z12.5 SCREENING PSA (PROSTATE SPECIFIC ANTIGEN): ICD-10-CM

## 2020-03-06 DIAGNOSIS — Z00.00 ROUTINE GENERAL MEDICAL EXAMINATION AT A HEALTH CARE FACILITY: Primary | ICD-10-CM

## 2020-03-06 DIAGNOSIS — Z79.899 MEDICATION MANAGEMENT: ICD-10-CM

## 2020-03-06 DIAGNOSIS — Z13.6 CARDIOVASCULAR SCREENING; LDL GOAL LESS THAN 130: ICD-10-CM

## 2020-03-06 DIAGNOSIS — N40.1 BENIGN PROSTATIC HYPERPLASIA WITH WEAK URINARY STREAM: ICD-10-CM

## 2020-03-06 LAB
ALBUMIN SERPL-MCNC: 3.9 G/DL (ref 3.4–5)
ALP SERPL-CCNC: 72 U/L (ref 40–150)
ALT SERPL W P-5'-P-CCNC: 46 U/L (ref 0–70)
ANION GAP SERPL CALCULATED.3IONS-SCNC: 4 MMOL/L (ref 3–14)
AST SERPL W P-5'-P-CCNC: 29 U/L (ref 0–45)
BILIRUB SERPL-MCNC: 1 MG/DL (ref 0.2–1.3)
BUN SERPL-MCNC: 17 MG/DL (ref 7–30)
CALCIUM SERPL-MCNC: 9.1 MG/DL (ref 8.5–10.1)
CHLORIDE SERPL-SCNC: 103 MMOL/L (ref 94–109)
CHOLEST SERPL-MCNC: 221 MG/DL
CO2 SERPL-SCNC: 30 MMOL/L (ref 20–32)
CREAT SERPL-MCNC: 0.88 MG/DL (ref 0.66–1.25)
ERYTHROCYTE [DISTWIDTH] IN BLOOD BY AUTOMATED COUNT: 13.3 % (ref 10–15)
GFR SERPL CREATININE-BSD FRML MDRD: >90 ML/MIN/{1.73_M2}
GLUCOSE SERPL-MCNC: 88 MG/DL (ref 70–99)
HCT VFR BLD AUTO: 48.7 % (ref 40–53)
HDLC SERPL-MCNC: 52 MG/DL
HGB BLD-MCNC: 15.9 G/DL (ref 13.3–17.7)
LDLC SERPL CALC-MCNC: 152 MG/DL
MCH RBC QN AUTO: 30.5 PG (ref 26.5–33)
MCHC RBC AUTO-ENTMCNC: 32.6 G/DL (ref 31.5–36.5)
MCV RBC AUTO: 94 FL (ref 78–100)
NONHDLC SERPL-MCNC: 169 MG/DL
PLATELET # BLD AUTO: 187 10E9/L (ref 150–450)
POTASSIUM SERPL-SCNC: 3.8 MMOL/L (ref 3.4–5.3)
PROT SERPL-MCNC: 8 G/DL (ref 6.8–8.8)
PSA SERPL-ACNC: 3.42 UG/L (ref 0–4)
RBC # BLD AUTO: 5.21 10E12/L (ref 4.4–5.9)
SODIUM SERPL-SCNC: 137 MMOL/L (ref 133–144)
TRIGL SERPL-MCNC: 83 MG/DL
TSH SERPL DL<=0.005 MIU/L-ACNC: 3.15 MU/L (ref 0.4–4)
WBC # BLD AUTO: 5.1 10E9/L (ref 4–11)

## 2020-03-06 PROCEDURE — 80061 LIPID PANEL: CPT | Performed by: INTERNAL MEDICINE

## 2020-03-06 PROCEDURE — 80053 COMPREHEN METABOLIC PANEL: CPT | Performed by: INTERNAL MEDICINE

## 2020-03-06 PROCEDURE — 99396 PREV VISIT EST AGE 40-64: CPT | Performed by: INTERNAL MEDICINE

## 2020-03-06 PROCEDURE — 85027 COMPLETE CBC AUTOMATED: CPT | Performed by: INTERNAL MEDICINE

## 2020-03-06 PROCEDURE — G0103 PSA SCREENING: HCPCS | Performed by: INTERNAL MEDICINE

## 2020-03-06 PROCEDURE — 84443 ASSAY THYROID STIM HORMONE: CPT | Performed by: INTERNAL MEDICINE

## 2020-03-06 PROCEDURE — 36415 COLL VENOUS BLD VENIPUNCTURE: CPT | Performed by: INTERNAL MEDICINE

## 2020-03-06 RX ORDER — TRIAMTERENE/HYDROCHLOROTHIAZID 37.5-25 MG
1 TABLET ORAL DAILY
Qty: 90 TABLET | Refills: 3 | Status: SHIPPED | OUTPATIENT
Start: 2020-03-06 | End: 2021-02-23

## 2020-03-06 RX ORDER — TADALAFIL 5 MG/1
5 TABLET ORAL DAILY
Qty: 90 TABLET | Refills: 3 | Status: SHIPPED | OUTPATIENT
Start: 2020-03-06 | End: 2020-03-06

## 2020-03-06 RX ORDER — TADALAFIL 5 MG/1
5 TABLET ORAL DAILY
Qty: 90 TABLET | Refills: 3 | Status: SHIPPED | OUTPATIENT
Start: 2020-03-06 | End: 2021-03-12

## 2020-03-06 RX ORDER — TRIAMTERENE/HYDROCHLOROTHIAZID 37.5-25 MG
1 TABLET ORAL DAILY
Qty: 90 TABLET | Refills: 3 | Status: SHIPPED | OUTPATIENT
Start: 2020-03-06 | End: 2020-03-06

## 2020-03-06 SDOH — SOCIAL STABILITY: SOCIAL NETWORK: HOW OFTEN DO YOU ATTEND CHURCH OR RELIGIOUS SERVICES?: 1 TO 4 TIMES PER YEAR

## 2020-03-06 SDOH — HEALTH STABILITY: MENTAL HEALTH: HOW OFTEN DO YOU HAVE 6 OR MORE DRINKS ON ONE OCCASION?: LESS THAN MONTHLY

## 2020-03-06 SDOH — HEALTH STABILITY: MENTAL HEALTH
STRESS IS WHEN SOMEONE FEELS TENSE, NERVOUS, ANXIOUS, OR CAN'T SLEEP AT NIGHT BECAUSE THEIR MIND IS TROUBLED. HOW STRESSED ARE YOU?: ONLY A LITTLE

## 2020-03-06 SDOH — SOCIAL STABILITY: SOCIAL NETWORK: ARE YOU MARRIED, WIDOWED, DIVORCED, SEPARATED, NEVER MARRIED, OR LIVING WITH A PARTNER?: LIVING WITH PARTNER

## 2020-03-06 ASSESSMENT — ENCOUNTER SYMPTOMS
PALPITATIONS: 0
SHORTNESS OF BREATH: 0
DIZZINESS: 0
CONSTIPATION: 0
CHILLS: 0
COUGH: 0
HEARTBURN: 0
PARESTHESIAS: 0
FEVER: 0
MYALGIAS: 0
EYE PAIN: 0
JOINT SWELLING: 0
HEMATOCHEZIA: 0
DIARRHEA: 0
NERVOUS/ANXIOUS: 0
SORE THROAT: 0
DYSURIA: 0
WEAKNESS: 0
FREQUENCY: 1
HEADACHES: 0
ABDOMINAL PAIN: 0
ARTHRALGIAS: 0
NAUSEA: 0
HEMATURIA: 0

## 2020-03-06 ASSESSMENT — MIFFLIN-ST. JEOR: SCORE: 1753.23

## 2020-03-06 NOTE — PROGRESS NOTES
SUBJECTIVE:   CC: Garrett Childers is an 64 year old male who presents for preventative health visit.     Patient is being seen for a physical and is fasting.    Healthy Habits:     Getting at least 3 servings of Calcium per day:  Yes    Bi-annual eye exam:  Yes    Dental care twice a year:  Yes    Sleep apnea or symptoms of sleep apnea:  Excessive snoring    Diet:  Carbohydrate counting    Frequency of exercise:  4-5 days/week    Duration of exercise:  45-60 minutes    Taking medications regularly:  Yes    Medication side effects:  Not applicable    PHQ-2 Total Score: 0    Additional concerns today:  No    BPH  Cialis 5 mg daily is working well to improve urine flow and urinary frequency.     Hypertension    BP Readings from Last 3 Encounters:   03/06/20 111/74   01/31/20 114/70   02/22/19 122/70     BP stable with triamterene 37.5 mg+ hydrochlorothiazide 25 mg daily.     Weight  He lost ~80 lbs in 2011 with smaller food portion sizes, regular exercise, and changing his relationship with food. His exercise is currently limited due to a torn ligament in the right foot. He is seeing podiatry for this.     Past/recent records reviewed and discussed for:  -Reviewed and updated medical hx, medications, social hx, family hx, and immunizations  -Last colonoscopy was 12/4/2017--normal. Due for repeat 2027  -Requests all rx be [facility]  -Inquires about shingrix vaccine. Recommended patient first check with his insurance about coverage details      Today's PHQ-2 Score:   PHQ-2 ( 1999 Pfizer) 3/1/2020   Q1: Little interest or pleasure in doing things 0   Q2: Feeling down, depressed or hopeless 0   PHQ-2 Score 0   Q1: Little interest or pleasure in doing things Not at all   Q2: Feeling down, depressed or hopeless Not at all   PHQ-2 Score 0     Abuse: Current or Past(Physical, Sexual or Emotional)- No  Do you feel safe in your environment? Yes    Social History     Tobacco Use     Smoking status: Passive Smoke Exposure -  Never Smoker     Smokeless tobacco: Former User     Tobacco comment: cigar, rarely   Substance Use Topics     Alcohol use: Yes     Frequency: 2-3 times a week     Drinks per session: 1 or 2     Binge frequency: Less than monthly     Comment: <6 drinks/week. May have 2 beers or one mixed drinks on a given day     Alcohol Use 3/1/2020   Prescreen: >3 drinks/day or >7 drinks/week? No   Prescreen: >3 drinks/day or >7 drinks/week? -     Last PSA:   PSA   Date Value Ref Range Status   02/22/2019 3.69 0 - 4 ug/L Final     Comment:     Assay Method:  Chemiluminescence using Siemens Vista analyzer     Reviewed orders with patient. Reviewed health maintenance and updated orders accordingly - Yes  Labs reviewed in EPIC    Reviewed and updated as needed this visit by clinical staff  Tobacco  Allergies  Meds  Med Hx  Surg Hx  Fam Hx  Soc Hx        Reviewed and updated as needed this visit by Provider  Tobacco  Soc Hx         Review of Systems   Constitutional: Negative for chills and fever.   HENT: Negative for congestion, ear pain, hearing loss and sore throat.    Eyes: Negative for pain and visual disturbance.   Respiratory: Negative for cough and shortness of breath.    Cardiovascular: Negative for chest pain, palpitations and peripheral edema.   Gastrointestinal: Negative for abdominal pain, constipation, diarrhea, heartburn, hematochezia and nausea.   Genitourinary: Positive for frequency. Negative for discharge, dysuria, genital sores, hematuria, impotence and urgency.   Musculoskeletal: Negative for arthralgias, joint swelling and myalgias.   Skin: Negative for rash.   Neurological: Negative for dizziness, weakness, headaches and paresthesias.   Psychiatric/Behavioral: Negative for mood changes. The patient is not nervous/anxious.      This document serves as a record of the services and decisions personally performed and made by Bong Vanegas MD. It was created on his behalf by Sheree Jasso, a trained medical  "scribe. The creation of this document is based on the provider's statements to the medical scribe.  Sheree Jasso March 6, 2020 8:55 AM     OBJECTIVE:   /74 (BP Location: Left arm, Patient Position: Sitting, Cuff Size: Adult Large)   Pulse 68   Temp 97.9  F (36.6  C) (Oral)   Resp 18   Ht 1.82 m (5' 11.65\")   Wt 92.6 kg (204 lb 1.6 oz)   SpO2 100%   BMI 27.95 kg/m      Physical Exam  GENERAL: healthy, alert and no distress  EYES: Eyes grossly normal to inspection, PERRL and conjunctivae and sclerae normal  HENT: ear canals and TM's normal, nose and mouth without ulcers or lesions  NECK: no adenopathy, no asymmetry, masses, or scars and thyroid normal to palpation  RESP: lungs clear to auscultation - no rales, rhonchi or wheezes  CV: regular rate and rhythm, normal S1 S2, no S3 or S4, no murmur, click or rub, no peripheral edema and peripheral pulses strong  ABDOMEN: soft, nontender, no hepatosplenomegaly, no masses and bowel sounds normal   (male): normal male genitalia without lesions or urethral discharge, no hernia  RECTAL: normal sphincter tone, no rectal masses, prostate normal size, smooth, nontender without nodules or masses  MS: no gross musculoskeletal defects noted, no edema  SKIN: no suspicious lesions or rashes  NEURO: Normal strength and tone, mentation intact and speech normal  PSYCH: mentation appears normal, affect normal/bright    Diagnostic Test Results:  Labs reviewed in Epic  No results found for this or any previous visit (from the past 24 hour(s)).    ASSESSMENT/PLAN:   (Z00.00) Routine general medical examination at a health care facility  (primary encounter diagnosis)  Comment: Stable health. See epic orders.  Plan: Comprehensive metabolic panel, Lipid panel         reflex to direct LDL Fasting, TSH with free T4         reflex, CBC with platelets, Prostate spec         antigen screen          (I10) Essential hypertension with goal blood pressure less than 140/90  Comment: BP " "at target. Continue current meds.  Plan: triamterene-HCTZ (MAXZIDE-25) 37.5-25 MG         tablet, DISCONTINUED: triamterene-HCTZ         (MAXZIDE-25) 37.5-25 MG tablet          (N40.1,  R39.12) Benign prostatic hyperplasia with weak urinary stream  Comment: stable, continue current meds  Plan: tadalafil (CIALIS) 5 MG tablet          (R39.198) Slowing of urinary stream  Comment: Stable with cialis. Refilled rx  Plan:     (Z13.6) CARDIOVASCULAR SCREENING; LDL GOAL LESS THAN 130  Comment:   Plan: Comprehensive metabolic panel, Lipid panel         reflex to direct LDL Fasting          (Z12.5) Screening PSA (prostate specific antigen)  Comment:   Plan: Prostate spec antigen screen          (Z79.899) Medication management  Comment:   Plan: TSH with free T4 reflex, CBC with platelets          Patient Instructions       Everything looks fine!    Refills of medications have been faxed to your pharmacy.     I'll get back to you with lab results soon, especially if there is anything of concern.      See me in a year, sooner if problems.        COUNSELING:   Reviewed preventive health counseling, as reflected in patient instructions       Regular exercise       Healthy diet/nutrition       Colon cancer screening       Prostate cancer screening    Estimated body mass index is 27.95 kg/m  as calculated from the following:    Height as of this encounter: 1.82 m (5' 11.65\").    Weight as of this encounter: 92.6 kg (204 lb 1.6 oz).  Weight management plan: Discussed healthy diet and exercise guidelines   reports that he is a non-smoker but has been exposed to tobacco smoke. He quit smokeless tobacco use about 15 years ago.    Counseling Resources:  ATP IV Guidelines  Pooled Cohorts Equation Calculator  FRAX Risk Assessment  ICSI Preventive Guidelines  Dietary Guidelines for Americans, 2010  USDA's MyPlate  ASA Prophylaxis  Lung CA Screening      The information in this document, created by the medical scribe for me, accurately " reflects the services I personally performed and the decisions made by me. I have reviewed and approved this document for accuracy prior to leaving the patient care area.  March 6, 2020 9:24 AM    Bong Vanegas MD,   [facility]

## 2020-03-06 NOTE — NURSING NOTE
"Patient is being seen for a physical and is fasting.  BP (!) 0/0 (BP Location: Left arm, Patient Position: Sitting, Cuff Size: Adult Large)   Pulse 68   Temp 97.9  F (36.6  C) (Oral)   Resp 18   Ht 1.82 m (5' 11.65\")   Wt 92.6 kg (204 lb 1.6 oz)   SpO2 100%   BMI 27.95 kg/m      "

## 2020-03-06 NOTE — PATIENT INSTRUCTIONS
Preventive Health Recommendations  Male Ages 50 - 64    Yearly exam:             See your health care provider every year in order to  o   Review health changes.   o   Discuss preventive care.    o   Review your medicines if your doctor has prescribed any.     Have a cholesterol test every 5 years, or more frequently if you are at risk for high cholesterol/heart disease.     Have a diabetes test (fasting glucose) every three years. If you are at risk for diabetes, you should have this test more often.     Have a colonoscopy at age 50, or have a yearly FIT test (stool test). These exams will check for colon cancer.      Talk with your health care provider about whether or not a prostate cancer screening test (PSA) is right for you.    You should be tested each year for STDs (sexually transmitted diseases), if you re at risk.     Shots: Get a flu shot each year. Get a tetanus shot every 10 years.     Nutrition:    Eat at least 5 servings of fruits and vegetables daily.     Eat whole-grain bread, whole-wheat pasta and brown rice instead of white grains and rice.     Get adequate Calcium and Vitamin D.     Lifestyle    Exercise for at least 150 minutes a week (30 minutes a day, 5 days a week). This will help you control your weight and prevent disease.     Limit alcohol to one drink per day.     No smoking.     Wear sunscreen to prevent skin cancer.     See your dentist every six months for an exam and cleaning.     See your eye doctor every 1 to 2 years.        Everything looks fine!    Refills of medications have been faxed to your pharmacy.     I'll get back to you with lab results soon, especially if there is anything of concern.      See me in a year, sooner if problems.

## 2020-06-29 NOTE — PATIENT INSTRUCTIONS
Everything looks fine!    Refills of medications have been faxed to your pharmacy.     I'll get back to you with lab results soon, especially if there is anything of concern.      See you in a year, sooner if problems.     Eucrisa Pregnancy And Lactation Text: This medication has not been assigned a Pregnancy Risk Category but animal studies failed to show danger with the topical medication. It is unknown if the medication is excreted in breast milk.

## 2021-02-21 DIAGNOSIS — I10 ESSENTIAL HYPERTENSION WITH GOAL BLOOD PRESSURE LESS THAN 140/90: ICD-10-CM

## 2021-02-23 RX ORDER — TRIAMTERENE/HYDROCHLOROTHIAZID 37.5-25 MG
1 TABLET ORAL DAILY
Qty: 90 TABLET | Refills: 0 | Status: SHIPPED | OUTPATIENT
Start: 2021-02-23 | End: 2021-03-12

## 2021-03-09 ASSESSMENT — ENCOUNTER SYMPTOMS
FREQUENCY: 0
DIARRHEA: 0
HEARTBURN: 0
CHILLS: 0
NERVOUS/ANXIOUS: 0
HEMATOCHEZIA: 0
PARESTHESIAS: 0
HEADACHES: 0
PALPITATIONS: 0
COUGH: 0
SHORTNESS OF BREATH: 0
NAUSEA: 0
ABDOMINAL PAIN: 0
MYALGIAS: 0
CONSTIPATION: 0
HEMATURIA: 0
WEAKNESS: 0
FEVER: 0
DYSURIA: 0
JOINT SWELLING: 0
ARTHRALGIAS: 0
SORE THROAT: 0
DIZZINESS: 0
EYE PAIN: 0

## 2021-03-12 ENCOUNTER — OFFICE VISIT (OUTPATIENT)
Dept: INTERNAL MEDICINE | Facility: CLINIC | Age: 66
End: 2021-03-12
Payer: COMMERCIAL

## 2021-03-12 VITALS
SYSTOLIC BLOOD PRESSURE: 128 MMHG | WEIGHT: 202.9 LBS | DIASTOLIC BLOOD PRESSURE: 80 MMHG | BODY MASS INDEX: 27.48 KG/M2 | RESPIRATION RATE: 18 BRPM | TEMPERATURE: 98.1 F | HEIGHT: 72 IN | OXYGEN SATURATION: 99 % | HEART RATE: 74 BPM

## 2021-03-12 DIAGNOSIS — I10 ESSENTIAL HYPERTENSION WITH GOAL BLOOD PRESSURE LESS THAN 140/90: ICD-10-CM

## 2021-03-12 DIAGNOSIS — R39.198 SLOWING OF URINARY STREAM: ICD-10-CM

## 2021-03-12 DIAGNOSIS — Z00.00 ROUTINE GENERAL MEDICAL EXAMINATION AT A HEALTH CARE FACILITY: Primary | ICD-10-CM

## 2021-03-12 DIAGNOSIS — Z13.6 CARDIOVASCULAR SCREENING; LDL GOAL LESS THAN 130: ICD-10-CM

## 2021-03-12 DIAGNOSIS — Z79.899 MEDICATION MANAGEMENT: ICD-10-CM

## 2021-03-12 DIAGNOSIS — Z12.5 SCREENING PSA (PROSTATE SPECIFIC ANTIGEN): ICD-10-CM

## 2021-03-12 DIAGNOSIS — R39.12 BENIGN PROSTATIC HYPERPLASIA WITH WEAK URINARY STREAM: ICD-10-CM

## 2021-03-12 DIAGNOSIS — N40.1 BENIGN PROSTATIC HYPERPLASIA WITH WEAK URINARY STREAM: ICD-10-CM

## 2021-03-12 LAB
ERYTHROCYTE [DISTWIDTH] IN BLOOD BY AUTOMATED COUNT: 13.4 % (ref 10–15)
HCT VFR BLD AUTO: 46.6 % (ref 40–53)
HGB BLD-MCNC: 15.6 G/DL (ref 13.3–17.7)
MCH RBC QN AUTO: 31.5 PG (ref 26.5–33)
MCHC RBC AUTO-ENTMCNC: 33.5 G/DL (ref 31.5–36.5)
MCV RBC AUTO: 94 FL (ref 78–100)
PLATELET # BLD AUTO: 156 10E9/L (ref 150–450)
RBC # BLD AUTO: 4.95 10E12/L (ref 4.4–5.9)
WBC # BLD AUTO: 4.2 10E9/L (ref 4–11)

## 2021-03-12 PROCEDURE — 85027 COMPLETE CBC AUTOMATED: CPT | Performed by: INTERNAL MEDICINE

## 2021-03-12 PROCEDURE — G0103 PSA SCREENING: HCPCS | Performed by: INTERNAL MEDICINE

## 2021-03-12 PROCEDURE — 80061 LIPID PANEL: CPT | Performed by: INTERNAL MEDICINE

## 2021-03-12 PROCEDURE — 84443 ASSAY THYROID STIM HORMONE: CPT | Performed by: INTERNAL MEDICINE

## 2021-03-12 PROCEDURE — 99214 OFFICE O/P EST MOD 30 MIN: CPT | Mod: 25 | Performed by: INTERNAL MEDICINE

## 2021-03-12 PROCEDURE — 36415 COLL VENOUS BLD VENIPUNCTURE: CPT | Performed by: INTERNAL MEDICINE

## 2021-03-12 PROCEDURE — 80053 COMPREHEN METABOLIC PANEL: CPT | Performed by: INTERNAL MEDICINE

## 2021-03-12 PROCEDURE — 99396 PREV VISIT EST AGE 40-64: CPT | Performed by: INTERNAL MEDICINE

## 2021-03-12 RX ORDER — TRIAMTERENE/HYDROCHLOROTHIAZID 37.5-25 MG
1 TABLET ORAL DAILY
Qty: 90 TABLET | Refills: 3 | Status: SHIPPED | OUTPATIENT
Start: 2021-03-12 | End: 2022-02-14

## 2021-03-12 RX ORDER — TADALAFIL 5 MG/1
5 TABLET ORAL DAILY
Qty: 90 TABLET | Refills: 3 | Status: SHIPPED | OUTPATIENT
Start: 2021-03-12 | End: 2022-02-24

## 2021-03-12 ASSESSMENT — ENCOUNTER SYMPTOMS
CONSTIPATION: 0
JOINT SWELLING: 0
EYE PAIN: 0
HEARTBURN: 0
DYSURIA: 0
FEVER: 0
NERVOUS/ANXIOUS: 0
SHORTNESS OF BREATH: 0
DIARRHEA: 0
ABDOMINAL PAIN: 0
PARESTHESIAS: 0
WEAKNESS: 0
SORE THROAT: 0
ARTHRALGIAS: 0
FREQUENCY: 0
DIZZINESS: 0
CHILLS: 0
HEADACHES: 0
COUGH: 0
NAUSEA: 0
HEMATURIA: 0
MYALGIAS: 0
PALPITATIONS: 0
HEMATOCHEZIA: 0

## 2021-03-12 ASSESSMENT — MIFFLIN-ST. JEOR: SCORE: 1742.79

## 2021-03-12 NOTE — PATIENT INSTRUCTIONS
Preventive Health Recommendations  Male Ages 50 - 64    Yearly exam:             See your health care provider every year in order to  o   Review health changes.   o   Discuss preventive care.    o   Review your medicines if your doctor has prescribed any.     Have a cholesterol test every 5 years, or more frequently if you are at risk for high cholesterol/heart disease.     Have a diabetes test (fasting glucose) every three years. If you are at risk for diabetes, you should have this test more often.     Have a colonoscopy at age 50, or have a yearly FIT test (stool test). These exams will check for colon cancer.      Talk with your health care provider about whether or not a prostate cancer screening test (PSA) is right for you.    You should be tested each year for STDs (sexually transmitted diseases), if you re at risk.     Shots: Get a flu shot each year. Get a tetanus shot every 10 years.     Nutrition:    Eat at least 5 servings of fruits and vegetables daily.     Eat whole-grain bread, whole-wheat pasta and brown rice instead of white grains and rice.     Get adequate Calcium and Vitamin D.     Lifestyle    Exercise for at least 150 minutes a week (30 minutes a day, 5 days a week). This will help you control your weight and prevent disease.     Limit alcohol to one drink per day.     No smoking.     Wear sunscreen to prevent skin cancer.     See your dentist every six months for an exam and cleaning.     See your eye doctor every 1 to 2 years.      Everything looks fine!    Refills of medications have been faxed to your pharmacy.     Lab results available soon on [employer].    See you in a year, sooner if problems.

## 2021-03-12 NOTE — PROGRESS NOTES
SUBJECTIVE:   CC: Garrett Childers is an 65 year old male who presents for preventative health visit.     Patient has been advised of split billing requirements and indicates understanding: Yes  Healthy Habits:     Getting at least 3 servings of Calcium per day:  Yes    Bi-annual eye exam:  Yes    Dental care twice a year:  Yes    Sleep apnea or symptoms of sleep apnea:  None    Diet:  Low fat/cholesterol    Frequency of exercise:  4-5 days/week    Duration of exercise:  45-60 minutes    Taking medications regularly:  Yes    Medication side effects:  None    PHQ-2 Total Score: 0    Additional concerns today:  No      PROBLEMS TO ADD ON...In addition to a preventive exam, we addressed hypertension, slowing of urinary stream.    The patient is tolerating his current medications without any adverse effects.  BP appears well controlled on diuretic Rx.   Urinary symptoms (slowing of urinary stream) improved on daily tadalafil.     Today's PHQ-2 Score:   PHQ-2 ( 1999 Pfizer) 3/9/2021   Q1: Little interest or pleasure in doing things 0   Q2: Feeling down, depressed or hopeless 0   PHQ-2 Score 0   Q1: Little interest or pleasure in doing things Not at all   Q2: Feeling down, depressed or hopeless Not at all   PHQ-2 Score 0       Abuse: Current or Past(Physical, Sexual or Emotional)- No  Do you feel safe in your environment? Yes        Social History     Tobacco Use     Smoking status: Passive Smoke Exposure - Never Smoker     Smokeless tobacco: Former User     Tobacco comment: cigar, rarely   Substance Use Topics     Alcohol use: Yes     Frequency: 2-3 times a week     Drinks per session: 1 or 2     Binge frequency: Less than monthly     Comment: <6 drinks/week. May have 2 beers or one mixed drinks on a given day         Alcohol Use 3/9/2021   Prescreen: >3 drinks/day or >7 drinks/week? No   Prescreen: >3 drinks/day or >7 drinks/week? -       Last PSA:   PSA   Date Value Ref Range Status   03/06/2020 3.42 0 - 4 ug/L Final      Comment:     Assay Method:  Chemiluminescence using Siemens Vista analyzer       Reviewed orders with patient. Reviewed health maintenance and updated orders accordingly - Yes    Reviewed and updated as needed this visit by clinical staff                 Reviewed and updated as needed this visit by Provider                  Review of Systems   Constitutional: Negative for chills and fever.   HENT: Negative for congestion, ear pain, hearing loss and sore throat.    Eyes: Negative for pain and visual disturbance.   Respiratory: Negative for cough and shortness of breath.    Cardiovascular: Negative for chest pain, palpitations and peripheral edema.   Gastrointestinal: Negative for abdominal pain, constipation, diarrhea, heartburn, hematochezia and nausea.   Genitourinary: Negative for discharge, dysuria, frequency, genital sores, hematuria, impotence and urgency.   Musculoskeletal: Negative for arthralgias, joint swelling and myalgias.   Skin: Negative for rash.   Neurological: Negative for dizziness, weakness, headaches and paresthesias.   Psychiatric/Behavioral: Negative for mood changes. The patient is not nervous/anxious.      CONSTITUTIONAL: NEGATIVE for fever, chills, change in weight  INTEGUMENTARY/SKIN: NEGATIVE for worrisome rashes, moles or lesions  EYES: NEGATIVE for vision changes or irritation  ENT: NEGATIVE for ear, mouth and throat problems  RESP: NEGATIVE for significant cough or SOB  CV: NEGATIVE for chest pain, palpitations or peripheral edema  GI: NEGATIVE for nausea, abdominal pain, heartburn, or change in bowel habits   male: negative for dysuria, hematuria, decreased urinary stream, erectile dysfunction, urethral discharge  MUSCULOSKELETAL: NEGATIVE for significant arthralgias or myalgia  NEURO: NEGATIVE for weakness, dizziness or paresthesias  ENDOCRINE: NEGATIVE for temperature intolerance, skin/hair changes  HEME/ALLERGY/IMMUNE: NEGATIVE for bleeding problems  PSYCHIATRIC: NEGATIVE for  changes in mood or affect    OBJECTIVE:   There were no vitals taken for this visit.    Physical Exam  GENERAL: healthy, alert and no distress  EYES: Eyes grossly normal to inspection, PERRL and conjunctivae and sclerae normal  HENT: ear canals and TM's normal, nose and mouth without ulcers or lesions  NECK: no adenopathy, no asymmetry, masses, or scars and thyroid normal to palpation  RESP: lungs clear to auscultation - no rales, rhonchi or wheezes  CV: regular rate and rhythm, normal S1 S2, no S3 or S4, no murmur, click or rub, no peripheral edema and peripheral pulses strong  ABDOMEN: soft, nontender, no hepatosplenomegaly, no masses and bowel sounds normal  MS: no gross musculoskeletal defects noted, no edema  SKIN: no suspicious lesions or rashes  NEURO: Normal strength and tone, mentation intact and speech normal  PSYCH: mentation appears normal, affect normal/bright    Diagnostic Test Results:  Labs reviewed in Epic    ASSESSMENT/PLAN:   (Z00.00) Routine general medical examination at a health care facility  (primary encounter diagnosis)  Comment: Stable health. See epic orders.   Plan: Comprehensive metabolic panel, Lipid panel         reflex to direct LDL Fasting, TSH with free T4         reflex, Prostate spec antigen screen, CBC with         platelets          (I10) Essential hypertension with goal blood pressure less than 140/90  Comment: BP at target. Continue current meds.   Plan: triamterene-HCTZ (MAXZIDE-25) 37.5-25 MG         tablet, OFFICE/OUTPT VISIT,EST,LEVL III          (N40.1,  R39.12) Benign prostatic hyperplasia with weak urinary stream  (R39.198) Slowing of urinary stream  Comment: Symptoms controlled on Rx--continue.   Plan: tadalafil (CIALIS) 5 MG tablet, OFFICE/OUTPT         VISIT,EST,LEVL III        (Z13.6) CARDIOVASCULAR SCREENING; LDL GOAL LESS THAN 130  Plan: Comprehensive metabolic panel, Lipid panel         reflex to direct LDL Fasting          (Z12.5) Screening PSA (prostate  "specific antigen)  Plan: Prostate spec antigen screen          (Z79.899) Medication management  Plan: TSH with free T4 reflex, CBC with platelets            Patient has been advised of split billing requirements and indicates understanding: Yes  COUNSELING:   Reviewed preventive health counseling, as reflected in patient instructions    Estimated body mass index is 27.95 kg/m  as calculated from the following:    Height as of 3/6/20: 1.82 m (5' 11.65\").    Weight as of 3/6/20: 92.6 kg (204 lb 1.6 oz).     Weight management plan: Discussed healthy diet and exercise guidelines    He reports that he is a non-smoker but has been exposed to tobacco smoke. He quit smokeless tobacco use about 16 years ago.      Counseling Resources:  ATP IV Guidelines  Pooled Cohorts Equation Calculator  FRAX Risk Assessment  ICSI Preventive Guidelines  Dietary Guidelines for Americans, 2010  USDA's MyPlate  ASA Prophylaxis  Lung CA Screening    Bong Vanegas MD, MD  [facility]  "

## 2021-03-13 LAB
ALBUMIN SERPL-MCNC: 4 G/DL (ref 3.4–5)
ALP SERPL-CCNC: 65 U/L (ref 40–150)
ALT SERPL W P-5'-P-CCNC: 50 U/L (ref 0–70)
ANION GAP SERPL CALCULATED.3IONS-SCNC: 6 MMOL/L (ref 3–14)
AST SERPL W P-5'-P-CCNC: 36 U/L (ref 0–45)
BILIRUB SERPL-MCNC: 0.7 MG/DL (ref 0.2–1.3)
BUN SERPL-MCNC: 15 MG/DL (ref 7–30)
CALCIUM SERPL-MCNC: 9.1 MG/DL (ref 8.5–10.1)
CHLORIDE SERPL-SCNC: 105 MMOL/L (ref 94–109)
CHOLEST SERPL-MCNC: 197 MG/DL
CO2 SERPL-SCNC: 27 MMOL/L (ref 20–32)
CREAT SERPL-MCNC: 0.86 MG/DL (ref 0.66–1.25)
GFR SERPL CREATININE-BSD FRML MDRD: >90 ML/MIN/{1.73_M2}
GLUCOSE SERPL-MCNC: 91 MG/DL (ref 70–99)
HDLC SERPL-MCNC: 48 MG/DL
LDLC SERPL CALC-MCNC: 132 MG/DL
NONHDLC SERPL-MCNC: 149 MG/DL
POTASSIUM SERPL-SCNC: 3.8 MMOL/L (ref 3.4–5.3)
PROT SERPL-MCNC: 7.4 G/DL (ref 6.8–8.8)
PSA SERPL-ACNC: 3.21 UG/L (ref 0–4)
SODIUM SERPL-SCNC: 138 MMOL/L (ref 133–144)
TRIGL SERPL-MCNC: 87 MG/DL
TSH SERPL DL<=0.005 MIU/L-ACNC: 2.69 MU/L (ref 0.4–4)

## 2021-03-15 ENCOUNTER — TELEPHONE (OUTPATIENT)
Dept: INTERNAL MEDICINE | Facility: CLINIC | Age: 66
End: 2021-03-15

## 2021-03-16 NOTE — TELEPHONE ENCOUNTER
Central Prior Authorization Team   Phone: [phone]      PA Initiation    Medication: Tadalafil 5 mg  Insurance Company: [employer] ([facility]) - Phone [phone] Fax [phone]  Pharmacy Filling the Rx: [facility] - [address]  Filling Pharmacy Phone: [phone]  Filling Pharmacy Fax:    Start Date: 3/16/2021

## 2021-03-16 NOTE — TELEPHONE ENCOUNTER
Prior Authorization Approval    Authorization Effective Date: 3/16/2021  Authorization Expiration Date: 3/16/2022  Medication: Tadalafil 5 mg  Approved Dose/Quantity:    Reference #:     Insurance Company: [employer] ([facility]) - Phone [phone] Fax [phone]  Expected CoPay:       CoPay Card Available:      Foundation Assistance Needed:    Which Pharmacy is filling the prescription (Not needed for infusion/clinic administered): [facility] - [address]  Pharmacy Notified: Yes  Patient Notified: Yes  **Instructed pharmacy to notify patient when script is ready to /ship.**

## 2021-06-24 ENCOUNTER — MYC MEDICAL ADVICE (OUTPATIENT)
Dept: INTERNAL MEDICINE | Facility: CLINIC | Age: 66
End: 2021-06-24

## 2021-06-24 DIAGNOSIS — M25.519 SHOULDER PAIN, UNSPECIFIED CHRONICITY, UNSPECIFIED LATERALITY: Primary | ICD-10-CM

## 2021-07-08 NOTE — PROGRESS NOTES
HISTORY OF PRESENT ILLNESS:    Garrett Childers is a 65 year old male who is seen in consultation at the request of Dr. Vanegas for right shoulder pain. Onset of pain chronic over the years due to overuse, worse over the last 5-6 months.  He is right hand dominant, and working as  at [employer].     Present symptoms: Right shoulder pain, pain is located to the superior shoulder with radiation to the lateral aspect. He also notes pain in the posterior upper arm.  Increased pain with raking, dumbbell flies, reaching overhead.   Patient notes he has to position the shoulder and arm in a certain manner to fall asleep at nighttime, he does not wake due to pain.  Patient denies weakness of the shoulder. He reports crepitus in the shoulder region, and popping sensation.   Current pain level: 3/10, Worst pain level: 7/10   Treatments tried to this point: Tylenol, Aspirin as needed  Orthopedic PMH: none     Past Medical History:   Diagnosis Date     DDD (degenerative disc disease), lumbar      Unspecified essential hypertension        Past Surgical History:   Procedure Laterality Date     COLONOSCOPY  2007    Normal, repeat in      COLONOSCOPY N/A 2017    Int Hemorrhoids, o/w normal. Procedure: COLONOSCOPY;  Colonoscopy;  Surgeon: Angeles Vyas MD;  Location:  GI     HERNIORRHAPHY INGUINAL Left 2015    Procedure: HERNIORRHAPHY INGUINAL;  Surgeon: Tyler Michael MD;  Location:  OR     ORTHOPEDIC SURGERY      corn shaved from left foot     [facility] NONSPECIFIC PROCEDURE  ~    Left knee surg for osteochondroma     [facility] NONSPECIFIC PROCEDURE  10/7/2015    Sebaceious cyst removal, left upper arm       Family History   Problem Relation Age of Onset     Cancer Daughter 15        PTLD B-cell large-cell Non-Hodgkin's,  at 16     Family History Negative Mother         Born [birth year]. Lives in independent living.      Eye Disorder Father         Macular degeneration.      Prostate Cancer  Father      Hypertension Father      Neurologic Disorder Father          age 84. Alzheimers, also depression     Depression Father      Arthritis Sister         Born 195     Heart Disease Daughter 0        twin born  heart transplant 3 months old CVA 1 /3 yo     Cerebrovascular Disease Daughter 1     Family History Negative Son              Family History Negative Daughter         twin born      Heart Disease Maternal Grandmother          66yo MI     Neurologic Disorder Maternal Grandfather          62yo Parkinson's     Heart Disease Paternal Grandmother          71yo      Family History Negative Paternal Grandfather          93yo      Colon Cancer No family hx of        Social History     Socioeconomic History     Marital status: Single     Spouse name: Tierney     Number of children: 3     Years of education: Not on file     Highest education level: Master's degree (e.g., MA, MS, Anna, MEd, MSW, JOSSE)   Occupational History     Occupation:      Employer:  [employer]   Social Needs     Financial resource strain: Not hard at all     Food insecurity     Worry: Never true     Inability: Never true     Transportation needs     Medical: No     Non-medical: No   Tobacco Use     Smoking status: Passive Smoke Exposure - Never Smoker     Smokeless tobacco: Former User     Tobacco comment: cigar, rarely   Substance and Sexual Activity     Alcohol use: Yes     Frequency: 2-3 times a week     Drinks per session: 1 or 2     Binge frequency: Less than monthly     Comment: <6 drinks/week. May have 2 beers or one mixed drinks on a given day     Drug use: No     Sexual activity: Yes     Partners: Female     Birth control/protection: None     Comment: We old!   Lifestyle     Physical activity     Days per week: 4 days     Minutes per session: 60 min     Stress: Only a little   Relationships     Social connections     Talks on phone: More than three times a week      "Gets together: Three times a week     Attends Restorationist service: 1 to 4 times per year     Active member of club or organization: Yes     Attends meetings of clubs or organizations: More than 4 times per year     Relationship status: Living with partner     Intimate partner violence     Fear of current or ex partner: No     Emotionally abused: No     Physically abused: No     Forced sexual activity: No   Other Topics Concern     Parent/sibling w/ CABG, MI or angioplasty before 65F 55M? No   Social History Narrative    Uses home gym 4-5 days/week.     Lives with partner (Laurel). Patient is father of three (one ). One child in [place], [place] and another in [place], [place].     No grandchildren (has step-grandchildren).        Current Outpatient Medications   Medication Sig Dispense Refill     tadalafil (CIALIS) 5 MG tablet Take 1 tablet (5 mg) by mouth daily 90 tablet 3     triamterene-HCTZ (MAXZIDE-25) 37.5-25 MG tablet Take 1 tablet by mouth daily 90 tablet 3       Allergies   Allergen Reactions     Lisinopril      Cough.       REVIEW OF SYSTEMS:  CONSTITUTIONAL:  NEGATIVE for fever, chills, change in weight  INTEGUMENTARY/SKIN:  NEGATIVE for worrisome rashes, moles or lesions  EYES:  NEGATIVE for vision changes or irritation  ENT/MOUTH:  NEGATIVE for ear, mouth and throat problems  RESP:  NEGATIVE for significant cough or SOB  BREAST:  NEGATIVE for masses, tenderness or discharge  CV:  Hypertension   GI:  NEGATIVE for nausea, abdominal pain, heartburn, or change in bowel habits  :  Negative   MUSCULOSKELETAL:  See HPI above  NEURO:  NEGATIVE for weakness, dizziness or paresthesias  ENDOCRINE:  NEGATIVE for temperature intolerance, skin/hair changes  HEME/ALLERGY/IMMUNE:  NEGATIVE for bleeding problems  PSYCHIATRIC:  NEGATIVE for changes in mood or affect      PHYSICAL EXAM:  /76 (BP Location: Right arm, Patient Position: Chair, Cuff Size: Adult Regular)   Ht 1.82 m (5' 11.65\")   Wt 91.2 kg (201 lb)  "  BMI 27.53 kg/m    Body mass index is 27.53 kg/m .   GENERAL APPEARANCE: healthy, alert and no distress   HEENT: No apparent thyroid megaly. Clear sclera with normal ocular movement  RESPIRATORY: No labored breathing  SKIN: no suspicious lesions or rashes  NEURO: Normal strength and tone, mentation intact and speech normal  VASCULAR: Good pulses, and capillary refill   LYMPH: no lymphadenopathy   PSYCH:  mentation appears normal and affect normal/bright    MUSCULOSKELETAL:  Not in acute distress  Normal gait    Right shoulder range of motion less than left shoulder  FE/ER/IR: 165/35/L5  Negative arm drop test  Some discomfort at extreme degrees range of motion    Isometric strength is full throughout both upper extremities  Elbow range of motion is full   strength is intact  Circulation is intact  Skin is intact  Sensation is intact     ASSESSMENT:  Chronic right shoulder pain from advanced glenohumeral joint DJD  No evidence of rotator cuff tear    PLAN:  The x-ray images from today were visualized.  He has basically bone-on-bone and early superior and posterior glenoid wear was felt to be present.  Functionally, he is doing quite well despite intermittent pain.  We talked about further observation with over-the-counter medication versus cortisone injection versus total shoulder arthroplasty.  He was interested in knowing more about total shoulder arthroplasty.  We had a long discussion as to the technical details, hospitalization, longevity, potential risks and use of arm sling for 4 weeks after the surgery.    It was informed that decision to go ahead with total shoulder arthroplasty would depend on possible future symptoms and he would make the decision when it would be appropriate.    For the time being, he decided to go ahead with further observation.  All the questions were answered.  Follow-up as needed.        Imaging Interpretation:   Advanced shoulder DJD      Garret Dodd MD  Department of Orthopedic  Surgery        Disclaimer: This note consists of symbols derived from keyboarding, dictation and/or voice recognition software. As a result, there may be errors in the script that have gone undetected. Please consider this when interpreting information found in this chart.

## 2021-07-09 ENCOUNTER — OFFICE VISIT (OUTPATIENT)
Dept: ORTHOPEDICS | Facility: CLINIC | Age: 66
End: 2021-07-09
Attending: INTERNAL MEDICINE
Payer: COMMERCIAL

## 2021-07-09 ENCOUNTER — ANCILLARY PROCEDURE (OUTPATIENT)
Dept: GENERAL RADIOLOGY | Facility: CLINIC | Age: 66
End: 2021-07-09
Attending: ORTHOPAEDIC SURGERY
Payer: COMMERCIAL

## 2021-07-09 VITALS
BODY MASS INDEX: 27.22 KG/M2 | WEIGHT: 201 LBS | SYSTOLIC BLOOD PRESSURE: 122 MMHG | DIASTOLIC BLOOD PRESSURE: 76 MMHG | HEIGHT: 72 IN

## 2021-07-09 DIAGNOSIS — G89.29 CHRONIC RIGHT SHOULDER PAIN: ICD-10-CM

## 2021-07-09 DIAGNOSIS — G89.29 CHRONIC RIGHT SHOULDER PAIN: Primary | ICD-10-CM

## 2021-07-09 DIAGNOSIS — M25.511 CHRONIC RIGHT SHOULDER PAIN: Primary | ICD-10-CM

## 2021-07-09 DIAGNOSIS — M19.011 PRIMARY OSTEOARTHRITIS OF RIGHT SHOULDER: ICD-10-CM

## 2021-07-09 DIAGNOSIS — M25.511 CHRONIC RIGHT SHOULDER PAIN: ICD-10-CM

## 2021-07-09 PROCEDURE — 73030 X-RAY EXAM OF SHOULDER: CPT | Mod: RT | Performed by: ORTHOPAEDIC SURGERY

## 2021-07-09 PROCEDURE — 99203 OFFICE O/P NEW LOW 30 MIN: CPT | Performed by: ORTHOPAEDIC SURGERY

## 2021-07-09 ASSESSMENT — MIFFLIN-ST. JEOR: SCORE: 1734.17

## 2021-07-09 NOTE — LETTER
7/9/2021         RE: Garrett Childers  [address] [address] [facility] [id number]        Dear Colleague,    Thank you for referring your patient, Garrett Childers, to the [facility] [place]. Please see a copy of my visit note below.    HISTORY OF PRESENT ILLNESS:    Garrett Childers is a 65 year old male who is seen in consultation at the request of Dr. Vanegas for right shoulder pain. Onset of pain chronic over the years due to overuse, worse over the last 5-6 months.  He is right hand dominant, and working as  at [employer].     Present symptoms: Right shoulder pain, pain is located to the superior shoulder with radiation to the lateral aspect. He also notes pain in the posterior upper arm.  Increased pain with raking, dumbbell flies, reaching overhead.   Patient notes he has to position the shoulder and arm in a certain manner to fall asleep at nighttime, he does not wake due to pain.  Patient denies weakness of the shoulder. He reports crepitus in the shoulder region, and popping sensation.   Current pain level: 3/10, Worst pain level: 7/10   Treatments tried to this point: Tylenol, Aspirin as needed  Orthopedic PMH: none     Past Medical History:   Diagnosis Date     DDD (degenerative disc disease), lumbar      Unspecified essential hypertension        Past Surgical History:   Procedure Laterality Date     COLONOSCOPY  11/29/2007    Normal, repeat in 2017     COLONOSCOPY N/A 12/4/2017    Int Hemorrhoids, o/w normal. Procedure: COLONOSCOPY;  Colonoscopy;  Surgeon: Angeles Vyas MD;  Location:  GI     HERNIORRHAPHY INGUINAL Left 4/13/2015    Procedure: HERNIORRHAPHY INGUINAL;  Surgeon: Tyler Michael MD;  Location:  OR     ORTHOPEDIC SURGERY      corn shaved from left foot     [facility] NONSPECIFIC PROCEDURE  ~1977    Left knee surg for osteochondroma     [facility] NONSPECIFIC PROCEDURE  10/7/2015    Sebaceious cyst removal, left upper arm       Family History   Problem  Relation Age of Onset     Cancer Daughter 15        PTLD B-cell large-cell Non-Hodgkin's,  at 16     Family History Negative Mother         Born [birth year]. Lives in independent living.      Eye Disorder Father         Macular degeneration.      Prostate Cancer Father      Hypertension Father      Neurologic Disorder Father          age 84. Alzheimers, also depression     Depression Father      Arthritis Sister         Born      Heart Disease Daughter 0        twin born  heart transplant 3 months old CVA 1 /3 yo     Cerebrovascular Disease Daughter 1     Family History Negative Son              Family History Negative Daughter         twin born      Heart Disease Maternal Grandmother          66yo MI     Neurologic Disorder Maternal Grandfather          62yo Parkinson's     Heart Disease Paternal Grandmother          69yo      Family History Negative Paternal Grandfather          91yo      Colon Cancer No family hx of        Social History     Socioeconomic History     Marital status: Single     Spouse name: Tierney     Number of children: 3     Years of education: Not on file     Highest education level: Master's degree (e.g., MA, MS, Anna, MEd, MSW, JOSSE)   Occupational History     Occupation:      Employer:  [employer]   Social Needs     Financial resource strain: Not hard at all     Food insecurity     Worry: Never true     Inability: Never true     Transportation needs     Medical: No     Non-medical: No   Tobacco Use     Smoking status: Passive Smoke Exposure - Never Smoker     Smokeless tobacco: Former User     Tobacco comment: cigar, rarely   Substance and Sexual Activity     Alcohol use: Yes     Frequency: 2-3 times a week     Drinks per session: 1 or 2     Binge frequency: Less than monthly     Comment: <6 drinks/week. May have 2 beers or one mixed drinks on a given day     Drug use: No     Sexual activity: Yes     Partners: Female      Birth control/protection: None     Comment: We old!   Lifestyle     Physical activity     Days per week: 4 days     Minutes per session: 60 min     Stress: Only a little   Relationships     Social connections     Talks on phone: More than three times a week     Gets together: Three times a week     Attends Scientology service: 1 to 4 times per year     Active member of club or organization: Yes     Attends meetings of clubs or organizations: More than 4 times per year     Relationship status: Living with partner     Intimate partner violence     Fear of current or ex partner: No     Emotionally abused: No     Physically abused: No     Forced sexual activity: No   Other Topics Concern     Parent/sibling w/ CABG, MI or angioplasty before 65F 55M? No   Social History Narrative    Uses home gym 4-5 days/week.     Lives with partner (Laurel). Patient is father of three (one ). One child in [place], [place] and another in [place], [place].     No grandchildren (has step-grandchildren).        Current Outpatient Medications   Medication Sig Dispense Refill     tadalafil (CIALIS) 5 MG tablet Take 1 tablet (5 mg) by mouth daily 90 tablet 3     triamterene-HCTZ (MAXZIDE-25) 37.5-25 MG tablet Take 1 tablet by mouth daily 90 tablet 3       Allergies   Allergen Reactions     Lisinopril      Cough.       REVIEW OF SYSTEMS:  CONSTITUTIONAL:  NEGATIVE for fever, chills, change in weight  INTEGUMENTARY/SKIN:  NEGATIVE for worrisome rashes, moles or lesions  EYES:  NEGATIVE for vision changes or irritation  ENT/MOUTH:  NEGATIVE for ear, mouth and throat problems  RESP:  NEGATIVE for significant cough or SOB  BREAST:  NEGATIVE for masses, tenderness or discharge  CV:  Hypertension   GI:  NEGATIVE for nausea, abdominal pain, heartburn, or change in bowel habits  :  Negative   MUSCULOSKELETAL:  See HPI above  NEURO:  NEGATIVE for weakness, dizziness or paresthesias  ENDOCRINE:  NEGATIVE for temperature intolerance, skin/hair  "changes  HEME/ALLERGY/IMMUNE:  NEGATIVE for bleeding problems  PSYCHIATRIC:  NEGATIVE for changes in mood or affect      PHYSICAL EXAM:  /76 (BP Location: Right arm, Patient Position: Chair, Cuff Size: Adult Regular)   Ht 1.82 m (5' 11.65\")   Wt 91.2 kg (201 lb)   BMI 27.53 kg/m    Body mass index is 27.53 kg/m .   GENERAL APPEARANCE: healthy, alert and no distress   HEENT: No apparent thyroid megaly. Clear sclera with normal ocular movement  RESPIRATORY: No labored breathing  SKIN: no suspicious lesions or rashes  NEURO: Normal strength and tone, mentation intact and speech normal  VASCULAR: Good pulses, and capillary refill   LYMPH: no lymphadenopathy   PSYCH:  mentation appears normal and affect normal/bright    MUSCULOSKELETAL:  Not in acute distress  Normal gait    Right shoulder range of motion less than left shoulder  FE/ER/IR: 165/35/L5  Negative arm drop test  Some discomfort at extreme degrees range of motion    Isometric strength is full throughout both upper extremities  Elbow range of motion is full   strength is intact  Circulation is intact  Skin is intact  Sensation is intact     ASSESSMENT:  Chronic right shoulder pain from advanced glenohumeral joint DJD  No evidence of rotator cuff tear    PLAN:  The x-ray images from today were visualized.  He has basically bone-on-bone and early superior and posterior glenoid wear was felt to be present.  Functionally, he is doing quite well despite intermittent pain.  We talked about further observation with over-the-counter medication versus cortisone injection versus total shoulder arthroplasty.  He was interested in knowing more about total shoulder arthroplasty.  We had a long discussion as to the technical details, hospitalization, longevity, potential risks and use of arm sling for 4 weeks after the surgery.    It was informed that decision to go ahead with total shoulder arthroplasty would depend on possible future symptoms and he would make " the decision when it would be appropriate.    For the time being, he decided to go ahead with further observation.  All the questions were answered.  Follow-up as needed.        Imaging Interpretation:   Advanced shoulder DJD      Garret Dodd MD  Department of Orthopedic Surgery        Disclaimer: This note consists of symbols derived from keyboarding, dictation and/or voice recognition software. As a result, there may be errors in the script that have gone undetected. Please consider this when interpreting information found in this chart.        Again, thank you for allowing me to participate in the care of your patient.        Sincerely,        Garret Dodd MD

## 2021-08-05 ENCOUNTER — TELEPHONE (OUTPATIENT)
Dept: ORTHOPEDICS | Facility: CLINIC | Age: 66
End: 2021-08-05

## 2021-08-05 NOTE — TELEPHONE ENCOUNTER
Patient calls asking that his medical records from Dr. Dodd and xrays be sent to Dr. Kee Lam of [facility]. He has an upcoming appointment on 8/17/21.     Provided information for patient to contact our medical records depart. Provided their phone number and also stated he can request them through [employer].   Discussed that we can push the images to [facility] and writer will have our xray department do that.   Suggested he check with [facility] a few days before the appointment to make sure they receive the images.   He verbalized understanding.     Xray staff informed of request and pushed images.     VANDA Mora RN

## 2021-09-30 NOTE — PROGRESS NOTES
[facility]  [address] [facility] [address]  Phone: [phone]  Primary Provider: Bong Vanegas  Pre-op Performing Provider: RON WASHINGTON    PREOPERATIVE EVALUATION:  Today's date: 10/1/2021    Garrett Childers is a 65 year old male who presents for a preoperative evaluation.    Surgical Information:  Surgery/Procedure: Shoulder Replacement  Surgery Location: [facility]  Surgeon: Dr. Lam  Surgery Date: 10/14/2021  Time of Surgery: AM  Where patient plans to recover: At home with family  Fax number for surgical facility: [phone]    Type of Anesthesia Anticipated: unknown    Assessment & Plan     The proposed surgical procedure is considered INTERMEDIATE risk.    Preop general physical exam  - EKG 12-lead complete w/read - Clinics  - Basic metabolic panel  (Ca, Cl, CO2, Creat, Gluc, K, Na, BUN)  - CBC with platelets    Primary osteoarthritis of right shoulder    Essential hypertension with goal blood pressure less than 140/90    Hyperlipidemia LDL goal <130           Risks and Recommendations:  The patient has the following additional risks and recommendations for perioperative complications:   - No identified additional risk factors other than previously addressed    Medication Instructions:   - Diuretics: HOLD on the day of surgery.    RECOMMENDATION:  APPROVAL GIVEN to proceed with proposed procedure, without further diagnostic evaluation.                  Subjective     HPI related to upcoming procedure: Chronic shoulder pain, glenohumeral osteoarthritis right shoulder    Preop Questions 9/28/2021   1. Have you ever had a heart attack or stroke? No   2. Have you ever had surgery on your heart or blood vessels, such as a stent placement, a coronary artery bypass, or surgery on an artery in your head, neck, heart, or legs? No   3. Do you have chest pain with activity? No   4. Do you have a history of  heart failure? No   5. Do you currently have a  cold, bronchitis or symptoms of other infection? No   6. Do you have a cough, shortness of breath, or wheezing? No   7. Do you or anyone in your family have previous history of blood clots? No   8. Do you or does anyone in your family have a serious bleeding problem such as prolonged bleeding following surgeries or cuts? No   9. Have you ever had problems with anemia or been told to take iron pills? No   10. Have you had any abnormal blood loss such as black, tarry or bloody stools? No   11. Have you ever had a blood transfusion? No   12. Are you willing to have a blood transfusion if it is medically needed before, during, or after your surgery? Yes   13. Have you or any of your relatives ever had problems with anesthesia? No   14. Do you have sleep apnea, excessive snoring or daytime drowsiness? No   15. Do you have any artifical heart valves or other implanted medical devices like a pacemaker, defibrillator, or continuous glucose monitor? No   16. Do you have artificial joints? No   17. Are you allergic to latex? No     Preoperative Review of :   reviewed - no record of controlled substances prescribed.    History of hypertension on diuretic.    History of BPH and erectile dysfunction on Cialis daily.    Review of Systems  CONSTITUTIONAL: NEGATIVE for fever, chills, change in weight  INTEGUMENTARY/SKIN: NEGATIVE for worrisome rashes, moles or lesions  EYES: NEGATIVE for vision changes or irritation  ENT/MOUTH: NEGATIVE for ear, mouth and throat problems  RESP: NEGATIVE for significant cough or SOB  CV: NEGATIVE for chest pain, palpitations or peripheral edema  GI: NEGATIVE for nausea, abdominal pain, heartburn, or change in bowel habits  : NEGATIVE for frequency, dysuria, or hematuria  MUSCULOSKELETAL:As noted above  NEURO: NEGATIVE for weakness, dizziness or paresthesias  ENDOCRINE: NEGATIVE for temperature intolerance, skin/hair changes  HEME: NEGATIVE for bleeding problems  PSYCHIATRIC: NEGATIVE for  changes in mood or affect    Patient Active Problem List    Diagnosis Date Noted     Lumbar facet arthropathy 06/25/2014     Priority: Medium     DDD (degenerative disc disease), lumbar 06/25/2014     Priority: Medium     Central spinal stenosis 06/25/2014     Priority: Medium     Lumbar disc herniation 06/25/2014     Priority: Medium     Advanced directives, counseling/discussion 05/19/2014     Priority: Medium     PAtient will bring copy in for his medical records       HYPERLIPIDEMIA LDL GOAL <130 10/31/2010     Priority: Medium     Essential hypertension with goal blood pressure less than 140/90      Priority: Medium     Problem list name updated by automated process. Provider to review        Past Medical History:   Diagnosis Date     DDD (degenerative disc disease), lumbar      Unspecified essential hypertension      Past Surgical History:   Procedure Laterality Date     COLONOSCOPY  11/29/2007    Normal, repeat in 2017     COLONOSCOPY N/A 12/4/2017    Int Hemorrhoids, o/w normal. Procedure: COLONOSCOPY;  Colonoscopy;  Surgeon: Angeles Vyas MD;  Location:  GI     HERNIORRHAPHY INGUINAL Left 4/13/2015    Procedure: HERNIORRHAPHY INGUINAL;  Surgeon: Tyler Michael MD;  Location:  OR     ORTHOPEDIC SURGERY      corn shaved from left foot     [facility] NONSPECIFIC PROCEDURE  ~1977    Left knee surg for osteochondroma     [facility] NONSPECIFIC PROCEDURE  10/7/2015    Sebaceious cyst removal, left upper arm     Current Outpatient Medications   Medication Sig Dispense Refill     tadalafil (CIALIS) 5 MG tablet Take 1 tablet (5 mg) by mouth daily 90 tablet 3     triamterene-HCTZ (MAXZIDE-25) 37.5-25 MG tablet Take 1 tablet by mouth daily 90 tablet 3       Allergies   Allergen Reactions     Lisinopril      Cough.        Social History     Tobacco Use     Smoking status: Passive Smoke Exposure - Never Smoker     Smokeless tobacco: Former User     Tobacco comment: cigar, rarely   Substance Use Topics     Alcohol use:  Yes     Comment: <6 drinks/week. May have 2 beers or one mixed drinks on a given day     History   Drug Use No         Objective     /70 (BP Location: Right arm, Patient Position: Sitting, Cuff Size: Adult Regular)   Pulse 70   Temp 98.5  F (36.9  C) (Oral)   Resp 16   Ht 1.829 m (6')   Wt 92.3 kg (203 lb 6.4 oz)   SpO2 97%   BMI 27.59 kg/m      Physical Exam    GENERAL APPEARANCE: healthy, alert and no distress     EYES: EOMI,  PERRL     HENT: ear canals and TM's normal and nose and mouth without ulcers or lesions     NECK: no adenopathy, no asymmetry, masses, or scars and thyroid normal to palpation     RESP: lungs clear to auscultation - no rales, rhonchi or wheezes     CV: regular rates and rhythm, normal S1 S2, no S3 or S4 and no murmur, click or rub     ABDOMEN:  soft, nontender, no HSM or masses and bowel sounds normal     MS: extremities normal- no gross deformities noted, no evidence of inflammation in joints, FROM in all extremities.     SKIN: no suspicious lesions or rashes     NEURO: Normal strength and tone, sensory exam grossly normal, mentation intact and speech normal     PSYCH: mentation appears normal. and affect normal/bright     LYMPHATICS: No cervical adenopathy    Recent Labs   Lab Test 03/12/21  0930 03/06/20  0922   HGB 15.6 15.9    187    137   POTASSIUM 3.8 3.8   CR 0.86 0.88        Diagnostics:  Labs pending at this time.  Results will be reviewed when available.   EKG: Normal Sinus Rhythm, left axis, consistent with anterior fascicular block, no ST-T changes, no ischemic changes, no LVH by voltage criteria, unchanged from previous tracings    Revised Cardiac Risk Index (RCRI):  The patient has the following serious cardiovascular risks for perioperative complications:   - No serious cardiac risks = 0 points     RCRI Interpretation: 0 points: Class I (very low risk - 0.4% complication rate)           Signed Electronically by: Ceasar Kelly MD  Copy of this  evaluation report is provided to requesting physician.

## 2021-09-30 NOTE — PATIENT INSTRUCTIONS

## 2021-10-01 ENCOUNTER — OFFICE VISIT (OUTPATIENT)
Dept: FAMILY MEDICINE | Facility: CLINIC | Age: 66
End: 2021-10-01
Payer: COMMERCIAL

## 2021-10-01 VITALS
OXYGEN SATURATION: 97 % | DIASTOLIC BLOOD PRESSURE: 70 MMHG | SYSTOLIC BLOOD PRESSURE: 122 MMHG | HEART RATE: 70 BPM | WEIGHT: 203.4 LBS | TEMPERATURE: 98.5 F | HEIGHT: 72 IN | RESPIRATION RATE: 16 BRPM | BODY MASS INDEX: 27.55 KG/M2

## 2021-10-01 DIAGNOSIS — M19.011 PRIMARY OSTEOARTHRITIS OF RIGHT SHOULDER: ICD-10-CM

## 2021-10-01 DIAGNOSIS — E78.5 HYPERLIPIDEMIA LDL GOAL <130: ICD-10-CM

## 2021-10-01 DIAGNOSIS — I10 ESSENTIAL HYPERTENSION WITH GOAL BLOOD PRESSURE LESS THAN 140/90: ICD-10-CM

## 2021-10-01 DIAGNOSIS — Z01.818 PREOP GENERAL PHYSICAL EXAM: Primary | ICD-10-CM

## 2021-10-01 LAB
ANION GAP SERPL CALCULATED.3IONS-SCNC: 5 MMOL/L (ref 3–14)
BUN SERPL-MCNC: 17 MG/DL (ref 7–30)
CALCIUM SERPL-MCNC: 9.6 MG/DL (ref 8.5–10.1)
CHLORIDE BLD-SCNC: 106 MMOL/L (ref 94–109)
CO2 SERPL-SCNC: 27 MMOL/L (ref 20–32)
CREAT SERPL-MCNC: 0.89 MG/DL (ref 0.66–1.25)
ERYTHROCYTE [DISTWIDTH] IN BLOOD BY AUTOMATED COUNT: 13 % (ref 10–15)
GFR SERPL CREATININE-BSD FRML MDRD: 90 ML/MIN/1.73M2
GLUCOSE BLD-MCNC: 95 MG/DL (ref 70–99)
HCT VFR BLD AUTO: 44.4 % (ref 40–53)
HGB BLD-MCNC: 15.1 G/DL (ref 13.3–17.7)
MCH RBC QN AUTO: 31.6 PG (ref 26.5–33)
MCHC RBC AUTO-ENTMCNC: 34 G/DL (ref 31.5–36.5)
MCV RBC AUTO: 93 FL (ref 78–100)
PLATELET # BLD AUTO: 184 10E3/UL (ref 150–450)
POTASSIUM BLD-SCNC: 4.5 MMOL/L (ref 3.4–5.3)
RBC # BLD AUTO: 4.78 10E6/UL (ref 4.4–5.9)
SODIUM SERPL-SCNC: 138 MMOL/L (ref 133–144)
WBC # BLD AUTO: 4.8 10E3/UL (ref 4–11)

## 2021-10-01 PROCEDURE — 90471 IMMUNIZATION ADMIN: CPT | Performed by: FAMILY MEDICINE

## 2021-10-01 PROCEDURE — 99214 OFFICE O/P EST MOD 30 MIN: CPT | Mod: 25 | Performed by: FAMILY MEDICINE

## 2021-10-01 PROCEDURE — 90682 RIV4 VACC RECOMBINANT DNA IM: CPT | Performed by: FAMILY MEDICINE

## 2021-10-01 PROCEDURE — 80048 BASIC METABOLIC PNL TOTAL CA: CPT | Performed by: FAMILY MEDICINE

## 2021-10-01 PROCEDURE — 36415 COLL VENOUS BLD VENIPUNCTURE: CPT | Performed by: FAMILY MEDICINE

## 2021-10-01 PROCEDURE — 93000 ELECTROCARDIOGRAM COMPLETE: CPT | Performed by: FAMILY MEDICINE

## 2021-10-01 PROCEDURE — 85027 COMPLETE CBC AUTOMATED: CPT | Performed by: FAMILY MEDICINE

## 2021-10-01 ASSESSMENT — MIFFLIN-ST. JEOR: SCORE: 1750.62

## 2021-10-01 NOTE — PROGRESS NOTES
Prior to immunization administration, verified patients identity using patient s name and date of birth. Please see Immunization Activity for additional information.     Screening Questionnaire for Adult Immunization    Are you sick today?   No   Do you have allergies to medications, food, a vaccine component or latex?   No   Have you ever had a serious reaction after receiving a vaccination?   No   Do you have a long-term health problem with heart, lung, kidney, or metabolic disease (e.g., diabetes), asthma, a blood disorder, no spleen, complement component deficiency, a cochlear implant, or a spinal fluid leak?  Are you on long-term aspirin therapy?   No   Do you have cancer, leukemia, HIV/AIDS, or any other immune system problem?   No   Do you have a parent, brother, or sister with an immune system problem?   No   In the past 3 months, have you taken medications that affect  your immune system, such as prednisone, other steroids, or anticancer drugs; drugs for the treatment of rheumatoid arthritis, Crohn s disease, or psoriasis; or have you had radiation treatments?   No   Have you had a seizure, or a brain or other nervous system problem?   No   During the past year, have you received a transfusion of blood or blood    products, or been given immune (gamma) globulin or antiviral drug?   No   For women: Are you pregnant or is there a chance you could become       pregnant during the next month?   No   Have you received any vaccinations in the past 4 weeks?   No     Immunization questionnaire answers were all negative.        Per orders of Dr. Kelly, injection of influenza given by Trina Roach. Patient instructed to remain in clinic for 15 minutes afterwards, and to report any adverse reaction to me immediately.       Screening performed by Trina Roach on 10/1/2021 at 9:32 AM.

## 2022-02-12 DIAGNOSIS — I10 ESSENTIAL HYPERTENSION WITH GOAL BLOOD PRESSURE LESS THAN 140/90: ICD-10-CM

## 2022-02-14 RX ORDER — TRIAMTERENE/HYDROCHLOROTHIAZID 37.5-25 MG
1 TABLET ORAL DAILY
Qty: 90 TABLET | Refills: 1 | Status: SHIPPED | OUTPATIENT
Start: 2022-02-14 | End: 2022-03-25

## 2022-02-15 NOTE — TELEPHONE ENCOUNTER
Pending Prescriptions:                       Disp   Refills    triamterene-HCTZ (MAXZIDE-25) 37.5-25 MG *90 tab*1            Sig: TAKE 1 TABLET BY MOUTH  DAILY    Prescription approved per [facility] Refill Protocol.

## 2022-03-22 ASSESSMENT — ENCOUNTER SYMPTOMS
SHORTNESS OF BREATH: 0
FEVER: 0
ARTHRALGIAS: 0
CONSTIPATION: 0
WEAKNESS: 0
HEADACHES: 0
SORE THROAT: 0
PALPITATIONS: 0
ABDOMINAL PAIN: 0
DIZZINESS: 0
CHILLS: 0
HEARTBURN: 0
JOINT SWELLING: 0
HEMATURIA: 0
PARESTHESIAS: 0
DIARRHEA: 0
COUGH: 0
DYSURIA: 0
MYALGIAS: 0
NERVOUS/ANXIOUS: 0
HEMATOCHEZIA: 0
FREQUENCY: 0
EYE PAIN: 0
NAUSEA: 0

## 2022-03-22 ASSESSMENT — ACTIVITIES OF DAILY LIVING (ADL): CURRENT_FUNCTION: NO ASSISTANCE NEEDED

## 2022-03-25 ENCOUNTER — OFFICE VISIT (OUTPATIENT)
Dept: INTERNAL MEDICINE | Facility: CLINIC | Age: 67
End: 2022-03-25
Payer: COMMERCIAL

## 2022-03-25 VITALS
OXYGEN SATURATION: 99 % | SYSTOLIC BLOOD PRESSURE: 132 MMHG | RESPIRATION RATE: 16 BRPM | DIASTOLIC BLOOD PRESSURE: 82 MMHG | HEART RATE: 82 BPM | BODY MASS INDEX: 28.04 KG/M2 | HEIGHT: 72 IN | WEIGHT: 207 LBS | TEMPERATURE: 98.2 F

## 2022-03-25 DIAGNOSIS — Z00.00 ENCOUNTER FOR MEDICARE ANNUAL WELLNESS EXAM: ICD-10-CM

## 2022-03-25 DIAGNOSIS — Z00.00 MEDICARE ANNUAL WELLNESS VISIT, INITIAL: Primary | ICD-10-CM

## 2022-03-25 DIAGNOSIS — Z12.5 SCREENING PSA (PROSTATE SPECIFIC ANTIGEN): ICD-10-CM

## 2022-03-25 DIAGNOSIS — R39.198 SLOWING OF URINARY STREAM: ICD-10-CM

## 2022-03-25 DIAGNOSIS — Z23 NEED FOR VACCINATION: ICD-10-CM

## 2022-03-25 DIAGNOSIS — N40.1 BENIGN PROSTATIC HYPERPLASIA WITH WEAK URINARY STREAM: ICD-10-CM

## 2022-03-25 DIAGNOSIS — R39.12 BENIGN PROSTATIC HYPERPLASIA WITH WEAK URINARY STREAM: ICD-10-CM

## 2022-03-25 DIAGNOSIS — I10 ESSENTIAL HYPERTENSION WITH GOAL BLOOD PRESSURE LESS THAN 140/90: ICD-10-CM

## 2022-03-25 DIAGNOSIS — Z79.899 MEDICATION MANAGEMENT: ICD-10-CM

## 2022-03-25 DIAGNOSIS — E78.5 HYPERLIPIDEMIA LDL GOAL <100: ICD-10-CM

## 2022-03-25 LAB
ALBUMIN UR-MCNC: NEGATIVE MG/DL
APPEARANCE UR: CLEAR
BILIRUB UR QL STRIP: NEGATIVE
COLOR UR AUTO: YELLOW
ERYTHROCYTE [DISTWIDTH] IN BLOOD BY AUTOMATED COUNT: 14.1 % (ref 10–15)
GLUCOSE UR STRIP-MCNC: NEGATIVE MG/DL
HCT VFR BLD AUTO: 47.6 % (ref 40–53)
HGB BLD-MCNC: 15.9 G/DL (ref 13.3–17.7)
HGB UR QL STRIP: NEGATIVE
KETONES UR STRIP-MCNC: NEGATIVE MG/DL
LEUKOCYTE ESTERASE UR QL STRIP: NEGATIVE
MCH RBC QN AUTO: 31.9 PG (ref 26.5–33)
MCHC RBC AUTO-ENTMCNC: 33.4 G/DL (ref 31.5–36.5)
MCV RBC AUTO: 95 FL (ref 78–100)
NITRATE UR QL: NEGATIVE
PH UR STRIP: 8 [PH] (ref 5–7)
PLATELET # BLD AUTO: 171 10E3/UL (ref 150–450)
RBC # BLD AUTO: 4.99 10E6/UL (ref 4.4–5.9)
SP GR UR STRIP: 1.02 (ref 1–1.03)
UROBILINOGEN UR STRIP-ACNC: 0.2 E.U./DL
WBC # BLD AUTO: 4.9 10E3/UL (ref 4–11)

## 2022-03-25 PROCEDURE — 80061 LIPID PANEL: CPT | Performed by: INTERNAL MEDICINE

## 2022-03-25 PROCEDURE — 80053 COMPREHEN METABOLIC PANEL: CPT | Performed by: INTERNAL MEDICINE

## 2022-03-25 PROCEDURE — G0103 PSA SCREENING: HCPCS | Performed by: INTERNAL MEDICINE

## 2022-03-25 PROCEDURE — 90471 IMMUNIZATION ADMIN: CPT | Performed by: INTERNAL MEDICINE

## 2022-03-25 PROCEDURE — 85027 COMPLETE CBC AUTOMATED: CPT | Performed by: INTERNAL MEDICINE

## 2022-03-25 PROCEDURE — 84443 ASSAY THYROID STIM HORMONE: CPT | Performed by: INTERNAL MEDICINE

## 2022-03-25 PROCEDURE — 81003 URINALYSIS AUTO W/O SCOPE: CPT | Performed by: INTERNAL MEDICINE

## 2022-03-25 PROCEDURE — 99397 PER PM REEVAL EST PAT 65+ YR: CPT | Mod: 25 | Performed by: INTERNAL MEDICINE

## 2022-03-25 PROCEDURE — 93000 ELECTROCARDIOGRAM COMPLETE: CPT | Performed by: INTERNAL MEDICINE

## 2022-03-25 PROCEDURE — 90732 PPSV23 VACC 2 YRS+ SUBQ/IM: CPT | Performed by: INTERNAL MEDICINE

## 2022-03-25 PROCEDURE — 36415 COLL VENOUS BLD VENIPUNCTURE: CPT | Performed by: INTERNAL MEDICINE

## 2022-03-25 RX ORDER — TADALAFIL 5 MG/1
5 TABLET ORAL DAILY
Qty: 90 TABLET | Refills: 3 | Status: SHIPPED | OUTPATIENT
Start: 2022-03-25 | End: 2022-12-14

## 2022-03-25 RX ORDER — SIMVASTATIN 20 MG
20 TABLET ORAL AT BEDTIME
Qty: 90 TABLET | Refills: 0 | Status: SHIPPED | OUTPATIENT
Start: 2022-03-25 | End: 2022-06-02

## 2022-03-25 RX ORDER — TRIAMTERENE/HYDROCHLOROTHIAZID 37.5-25 MG
1 TABLET ORAL DAILY
Qty: 90 TABLET | Refills: 3 | Status: SHIPPED | OUTPATIENT
Start: 2022-03-25 | End: 2022-12-14

## 2022-03-25 SDOH — ECONOMIC STABILITY: FOOD INSECURITY: WITHIN THE PAST 12 MONTHS, YOU WORRIED THAT YOUR FOOD WOULD RUN OUT BEFORE YOU GOT MONEY TO BUY MORE.: NEVER TRUE

## 2022-03-25 SDOH — ECONOMIC STABILITY: INCOME INSECURITY: IN THE LAST 12 MONTHS, WAS THERE A TIME WHEN YOU WERE NOT ABLE TO PAY THE MORTGAGE OR RENT ON TIME?: NO

## 2022-03-25 SDOH — HEALTH STABILITY: PHYSICAL HEALTH: ON AVERAGE, HOW MANY MINUTES DO YOU ENGAGE IN EXERCISE AT THIS LEVEL?: 30 MIN

## 2022-03-25 SDOH — HEALTH STABILITY: PHYSICAL HEALTH: ON AVERAGE, HOW MANY DAYS PER WEEK DO YOU ENGAGE IN MODERATE TO STRENUOUS EXERCISE (LIKE A BRISK WALK)?: 7 DAYS

## 2022-03-25 SDOH — ECONOMIC STABILITY: FOOD INSECURITY: WITHIN THE PAST 12 MONTHS, THE FOOD YOU BOUGHT JUST DIDN'T LAST AND YOU DIDN'T HAVE MONEY TO GET MORE.: NEVER TRUE

## 2022-03-25 ASSESSMENT — ENCOUNTER SYMPTOMS
CHILLS: 0
NAUSEA: 0
DIARRHEA: 0
JOINT SWELLING: 0
FREQUENCY: 0
ABDOMINAL PAIN: 0
SORE THROAT: 0
COUGH: 0
EYE PAIN: 0
DIZZINESS: 0
WEAKNESS: 0
PARESTHESIAS: 0
PALPITATIONS: 0
HEMATURIA: 0
NERVOUS/ANXIOUS: 0
ARTHRALGIAS: 0
MYALGIAS: 0
HEARTBURN: 0
HEADACHES: 0
FEVER: 0
SHORTNESS OF BREATH: 0
DYSURIA: 0
HEMATOCHEZIA: 0
CONSTIPATION: 0

## 2022-03-25 ASSESSMENT — SOCIAL DETERMINANTS OF HEALTH (SDOH)
IN A TYPICAL WEEK, HOW MANY TIMES DO YOU TALK ON THE PHONE WITH FAMILY, FRIENDS, OR NEIGHBORS?: THREE TIMES A WEEK
HOW OFTEN DO YOU GET TOGETHER WITH FRIENDS OR RELATIVES?: ONCE A WEEK
HOW OFTEN DO YOU ATTENT MEETINGS OF THE CLUB OR ORGANIZATION YOU BELONG TO?: MORE THAN 4 TIMES PER YEAR
WITHIN THE LAST YEAR, HAVE YOU BEEN AFRAID OF YOUR PARTNER OR EX-PARTNER?: NO
DO YOU BELONG TO ANY CLUBS OR ORGANIZATIONS SUCH AS CHURCH GROUPS UNIONS, FRATERNAL OR ATHLETIC GROUPS, OR SCHOOL GROUPS?: YES
WITHIN THE LAST YEAR, HAVE TO BEEN RAPED OR FORCED TO HAVE ANY KIND OF SEXUAL ACTIVITY BY YOUR PARTNER OR EX-PARTNER?: NO
HOW OFTEN DO YOU ATTEND CHURCH OR RELIGIOUS SERVICES?: NEVER
HOW HARD IS IT FOR YOU TO PAY FOR THE VERY BASICS LIKE FOOD, HOUSING, MEDICAL CARE, AND HEATING?: NOT HARD AT ALL
WITHIN THE LAST YEAR, HAVE YOU BEEN HUMILIATED OR EMOTIONALLY ABUSED IN OTHER WAYS BY YOUR PARTNER OR EX-PARTNER?: NO
WITHIN THE LAST YEAR, HAVE YOU BEEN KICKED, HIT, SLAPPED, OR OTHERWISE PHYSICALLY HURT BY YOUR PARTNER OR EX-PARTNER?: NO
ARE YOU MARRIED, WIDOWED, DIVORCED, SEPARATED, NEVER MARRIED, OR LIVING WITH A PARTNER?: LIVING WITH PARTNER

## 2022-03-25 ASSESSMENT — LIFESTYLE VARIABLES
HOW OFTEN DO YOU HAVE SIX OR MORE DRINKS ON ONE OCCASION: NEVER
HOW OFTEN DO YOU HAVE A DRINK CONTAINING ALCOHOL: 2-3 TIMES A WEEK
HOW MANY STANDARD DRINKS CONTAINING ALCOHOL DO YOU HAVE ON A TYPICAL DAY: 1 OR 2

## 2022-03-25 ASSESSMENT — ACTIVITIES OF DAILY LIVING (ADL): CURRENT_FUNCTION: NO ASSISTANCE NEEDED

## 2022-03-25 NOTE — PROGRESS NOTES
"SUBJECTIVE:   Garrett Childers is a 66 year old male who presents for Preventive Visit.      Patient has been advised of split billing requirements and indicates understanding: Yes  Are you in the first 12 months of your Medicare coverage?  Yes,  Visual Acuity:  Right Eye: 20/15    Left Eye: 20/15  Both Eyes: 20/15    Healthy Habits:     In general, how would you rate your overall health?  Excellent    Frequency of exercise:  4-5 days/week    Duration of exercise:  15-30 minutes    Do you usually eat at least 4 servings of fruit and vegetables a day, include whole grains    & fiber and avoid regularly eating high fat or \"junk\" foods?  Yes    Taking medications regularly:  Yes    Ability to successfully perform activities of daily living:  No assistance needed    Home Safety:  No safety concerns identified    Hearing Impairment:  Feel that people are mumbling or not speaking clearly    In the past 6 months, have you been bothered by leaking of urine?  No    In general, how would you rate your overall mental or emotional health?  Excellent      PHQ-2 Total Score: 0    Additional concerns today:  No           Do you feel safe in your environment? Yes    Have you ever done Advance Care Planning? (For example, a Health Directive, POLST, or a discussion with a medical provider or your loved ones about your wishes): Yes, patient states has an Advance Care Planning document and will bring a copy to the clinic.       Fall risk  Fallen 2 or more times in the past year?: No  Any fall with injury in the past year?: No    Cognitive Screening   1) Repeat 3 items (Leader, Season, Table)    2) Clock draw: NORMAL  3) 3 item recall: Recalls 3 objects  Results: 3 items recalled: COGNITIVE IMPAIRMENT LESS LIKELY    Mini-CogTM Copyright DICK Ely. Licensed by the author for use in [facility] [employer]; reprinted with permission (johnnie@.[facility]). All rights reserved.      Do you have sleep apnea, excessive snoring or daytime drowsiness?: " yes    Reviewed and updated as needed this visit by clinical staff    Allergies  Meds              Reviewed and updated as needed this visit by Provider                 Social History     Tobacco Use     Smoking status: Passive Smoke Exposure - Never Smoker     Smokeless tobacco: Former User     Tobacco comment: cigar, rarely   Substance Use Topics     Alcohol use: Yes     Comment: <6 drinks/week. May have 2 beers or one mixed drinks on a given day     If you drink alcohol do you typically have >3 drinks per day or >7 drinks per week? No    Alcohol Use 3/22/2022   Prescreen: >3 drinks/day or >7 drinks/week? No   Prescreen: >3 drinks/day or >7 drinks/week? -         PROBLEMS TO ADD ON...In addition to an Annual Wellness Exam, we addressed hypertension, hyperlipidemia, erectile dysfunction and frequency/slowing of urinary stream.    BP appears satisfactorily controlled on current meds. We  Spoke with his significant other Laurel by phone, who wondered whether Hytrin would be a good medication for him due to his blood pressure and urinary frequency. We discussed alternative meds, such as tamsulosin, and agreed for now to leave BP med unchanged.     Reviewed his 10-year risk of stroke/heart attack over 15 percent, and after discussion he is open to starting simvastatin. (His significant other Laurel is taking this). See pt instructions and epic orders.      Tadalafil working to some degree for urinary symptoms, although he notes still having frequency and some slowing of stream.     Current providers sharing in care for this patient include:   Patient Care Team:  Bong Vanegas MD as PCP - General (Internal Medicine)  Bong Vanegas MD as Assigned PCP  Garret Dodd MD as Assigned Musculoskeletal Provider    The following health maintenance items are reviewed in Epic and correct as of today:  Health Maintenance Due   Topic Date Due     ANNUAL REVIEW OF  ORDERS  Never done     ZOSTER IMMUNIZATION (2 of 2)  "08/05/2020     FALL RISK ASSESSMENT  Never done     AORTIC ANEURYSM SCREENING (SYSTEM ASSIGNED)  Never done     Pneumococcal Vaccine: 65+ Years (1 of 1 - PPSV23) 12/10/2021     MEDICARE ANNUAL WELLNESS VISIT  03/12/2022     Pneumonia Vaccine: Provided Pneumovax-23        Review of Systems   Constitutional: Negative for chills and fever.   HENT: Negative for congestion, ear pain, hearing loss and sore throat.    Eyes: Negative for pain and visual disturbance.   Respiratory: Negative for cough and shortness of breath.    Cardiovascular: Negative for chest pain, palpitations and peripheral edema.   Gastrointestinal: Negative for abdominal pain, constipation, diarrhea, heartburn, hematochezia and nausea.   Genitourinary: Negative for dysuria, frequency, genital sores, hematuria, impotence, penile discharge and urgency.   Musculoskeletal: Negative for arthralgias, joint swelling and myalgias.   Skin: Negative for rash.   Neurological: Negative for dizziness, weakness, headaches and paresthesias.   Psychiatric/Behavioral: Negative for mood changes. The patient is not nervous/anxious.        OBJECTIVE:   /82   Pulse 82   Temp 98.2  F (36.8  C) (Tympanic)   Resp 16   Ht 1.82 m (5' 11.65\")   Wt 93.9 kg (207 lb)   SpO2 99%   BMI 28.35 kg/m   Estimated body mass index is 28.35 kg/m  as calculated from the following:    Height as of this encounter: 1.82 m (5' 11.65\").    Weight as of this encounter: 93.9 kg (207 lb).     Physical Exam  GENERAL: healthy, alert and no distress  EYES: Eyes grossly normal to inspection, PERRL and conjunctivae and sclerae normal  HENT: ear canals and TM's normal, nose and mouth without ulcers or lesions  NECK: no adenopathy, no asymmetry, masses, or scars and thyroid normal to palpation  RESP: lungs clear to auscultation - no rales, rhonchi or wheezes  CV: regular rate and rhythm, normal S1 S2, no S3 or S4, no murmur, click or rub, no peripheral edema and peripheral pulses " "strong  ABDOMEN: soft, nontender, no hepatosplenomegaly, no masses and bowel sounds normal  MS: no gross musculoskeletal defects noted, no edema  SKIN: no suspicious lesions or rashes  NEURO: Normal strength and tone, mentation intact and speech normal  PSYCH: mentation appears normal, affect normal/bright    Diagnostic Test Results:  Labs reviewed in Epic    12-Lead EKG: Reviewed. Delayed anterior R-wave progression, similar to previous (disagree with computer interpretation of \"old anteroseptal infarct).   Sinus rhythm. Left axis deviation, normal intervals. No significant ST-T wave abnormalities or acute ischemic changes.     ASSESSMENT / PLAN:   (Z00.00) Medicare annual wellness visit, initial  (primary encounter diagnosis)  Comment: Stable health. See epic orders.   Plan: EKG 12-lead complete w/read - Clinics          (I10) Essential hypertension with goal blood pressure less than 140/90  Comment: BP satisfactorily controlled. Continue current meds.   Plan: triamterene-HCTZ (MAXZIDE-25) 37.5-25 MG tablet          (E78.5) Hyperlipidemia LDL goal <100  Comment: Begin simvastatin. Check lipids today and again in 2 months. See pt instructions and epic orders.   Plan: simvastatin (ZOCOR) 20 MG tablet, Lipid panel         reflex to direct LDL Fasting, **Comprehensive         metabolic panel FUTURE 2mo          (N40.1,  R39.12) Benign prostatic hyperplasia with weak urinary stream  Comment: Continue current meds.   Plan: tadalafil (CIALIS) 5 MG tablet          (R39.198) Slowing of urinary stream  Comment: Check UA. See pt instructions.   Plan: UA reflex to Microscopic - lab collect          (Z12.5) Screening PSA (prostate specific antigen)  Plan: PSA, screen          (Z79.899) Medication management  Plan: **TSH with free T4 reflex FUTURE 2mo, **CBC         with platelets FUTURE 2mo          (Z23) Need for vaccination  Plan: Pneumococcal vaccine 23 valent PPSV23          (Pneumovax) [83820]            Patient has been " "advised of split billing requirements and indicates understanding: Yes    COUNSELING:  Reviewed preventive health counseling, as reflected in patient instructions    Estimated body mass index is 28.35 kg/m  as calculated from the following:    Height as of this encounter: 1.82 m (5' 11.65\").    Weight as of this encounter: 93.9 kg (207 lb).        He reports that he is a non-smoker but has been exposed to tobacco smoke. He quit smokeless tobacco use about 17 years ago.      Appropriate preventive services were discussed with this patient, including applicable screening as appropriate for cardiovascular disease, diabetes, osteopenia/osteoporosis, and glaucoma.  As appropriate for age/gender, discussed screening for colorectal cancer, prostate cancer, breast cancer, and cervical cancer. Checklist reviewing preventive services available has been given to the patient.    Reviewed patients plan of care and provided an AVS. The Basic Care Plan (routine screening as documented in Health Maintenance) for Garrett meets the Care Plan requirement. This Care Plan has been established and reviewed with the Patient.    Counseling Resources:  ATP IV Guidelines  Pooled Cohorts Equation Calculator  Breast Cancer Risk Calculator  Breast Cancer: Medication to Reduce Risk  FRAX Risk Assessment  ICSI Preventive Guidelines  Dietary Guidelines for Americans, 2010  Weekend-a-gogo's MyPlate  ASA Prophylaxis  Lung CA Screening    Bong Vanegas MD,   [facility]      Patient Instructions     Let's start simvastatin (rationale: shown to reduce 10-year risk of stroke/heart attack).  We can recheck lipids in 2-3 months after starting simvastatin.     We can consider adding tamsulosin (Flomax) for prostate symptoms after we are sure of efficacy/side effects of simvastatin.     We discussed Hytrin (terazosin) which can treat both urinary flow and blood pressure, but decided to go in a different direction.     Overall, sverything " "looks fine!    Refills of previous medications have been faxed to your pharmacy.     Today's lab results will be available soon on Break30.    Schedule a fasting \"lab only\" appointment in 2-3 months after starting simvastatin.   Connect with me after that (via Break30) to review results and decide whether to add tamsulosin.     See me in a year, sooner if problems.         "

## 2022-03-25 NOTE — PATIENT INSTRUCTIONS
"Let's start simvastatin (rationale: shown to reduce 10-year risk of stroke/heart attack).  We can recheck lipids in 2-3 months after starting simvastatin.     We can consider adding tamsulosin (Flomax) for prostate symptoms after we are sure of efficacy/side effects of simvastatin.     We discussed Hytrin (terazosin) which can treat both urinary flow and blood pressure, but decided to go in a different direction.     Overall, sverything looks fine!    Refills of previous medications have been faxed to your pharmacy.     Today's lab results will be available soon on AllofMe.    Schedule a fasting \"lab only\" appointment in 2-3 months after starting simvastatin.   Connect with me after that (via AllofMe) to review results and decide whether to add tamsulosin.     See me in a year, sooner if problems.         Patient Education   Personalized Prevention Plan  You are due for the preventive services outlined below.  Your care team is available to assist you in scheduling these services.  If you have already completed any of these items, please share that information with your care team to update in your medical record.  Health Maintenance Due   Topic Date Due     Zoster (Shingles) Vaccine (2 of 2) 08/05/2020     AORTIC ANEURYSM SCREENING (SYSTEM ASSIGNED)  Never done        "

## 2022-03-25 NOTE — NURSING NOTE
Prior to immunization administration, verified patients identity using patient s name and date of birth. Please see Immunization Activity for additional information.     Screening Questionnaire for Adult Immunization    Are you sick today?   No   Do you have allergies to medications, food, a vaccine component or latex?   No   Have you ever had a serious reaction after receiving a vaccination?   No   Do you have a long-term health problem with heart, lung, kidney, or metabolic disease (e.g., diabetes), asthma, a blood disorder, no spleen, complement component deficiency, a cochlear implant, or a spinal fluid leak?  Are you on long-term aspirin therapy?   No   Do you have cancer, leukemia, HIV/AIDS, or any other immune system problem?   No   Do you have a parent, brother, or sister with an immune system problem?   No   In the past 3 months, have you taken medications that affect  your immune system, such as prednisone, other steroids, or anticancer drugs; drugs for the treatment of rheumatoid arthritis, Crohn s disease, or psoriasis; or have you had radiation treatments?   No   Have you had a seizure, or a brain or other nervous system problem?   No   During the past year, have you received a transfusion of blood or blood    products, or been given immune (gamma) globulin or antiviral drug?   No   For women: Are you pregnant or is there a chance you could become       pregnant during the next month?   No   Have you received any vaccinations in the past 4 weeks?   No     Immunization questionnaire answers were all negative.        Per orders of Dr. Vanegas, injection of Pneumococcal 23 given by Edda Yan CMA. Patient instructed to remain in clinic for 15 minutes afterwards, and to report any adverse reaction to me immediately.       Screening performed by Edda Yan CMA on 3/25/2022 at 10:14 AM.

## 2022-03-26 LAB
ALBUMIN SERPL-MCNC: 3.7 G/DL (ref 3.4–5)
ALP SERPL-CCNC: 73 U/L (ref 40–150)
ALT SERPL W P-5'-P-CCNC: 46 U/L (ref 0–70)
ANION GAP SERPL CALCULATED.3IONS-SCNC: 8 MMOL/L (ref 3–14)
AST SERPL W P-5'-P-CCNC: 33 U/L (ref 0–45)
BILIRUB SERPL-MCNC: 0.8 MG/DL (ref 0.2–1.3)
BUN SERPL-MCNC: 16 MG/DL (ref 7–30)
CALCIUM SERPL-MCNC: 9.6 MG/DL (ref 8.5–10.1)
CHLORIDE BLD-SCNC: 105 MMOL/L (ref 94–109)
CHOLEST SERPL-MCNC: 236 MG/DL
CO2 SERPL-SCNC: 24 MMOL/L (ref 20–32)
CREAT SERPL-MCNC: 0.9 MG/DL (ref 0.66–1.25)
FASTING STATUS PATIENT QL REPORTED: YES
GFR SERPL CREATININE-BSD FRML MDRD: >90 ML/MIN/1.73M2
GLUCOSE BLD-MCNC: 95 MG/DL (ref 70–99)
HDLC SERPL-MCNC: 52 MG/DL
LDLC SERPL CALC-MCNC: 160 MG/DL
NONHDLC SERPL-MCNC: 184 MG/DL
POTASSIUM BLD-SCNC: 4.3 MMOL/L (ref 3.4–5.3)
PROT SERPL-MCNC: 7.7 G/DL (ref 6.8–8.8)
PSA SERPL-MCNC: 5.2 UG/L (ref 0–4)
SODIUM SERPL-SCNC: 137 MMOL/L (ref 133–144)
TRIGL SERPL-MCNC: 119 MG/DL
TSH SERPL DL<=0.005 MIU/L-ACNC: 3.4 MU/L (ref 0.4–4)

## 2022-03-28 ENCOUNTER — TELEPHONE (OUTPATIENT)
Dept: INTERNAL MEDICINE | Facility: CLINIC | Age: 67
End: 2022-03-28
Payer: COMMERCIAL

## 2022-03-28 NOTE — TELEPHONE ENCOUNTER
Central Prior Authorization Team   Phone: [phone]      Prior Authorization Retail Medication Request    Medication/Dose: tadalafil (CIALIS) 5 MG tablet  ICD code (if different than what is on RX):  Benign prostatic hyperplasia with weak urinary stream [N40.1, R39.12]   Previously Tried and Failed:    Rationale:      Insurance Name:  [employer]  Insurance ID:  [id number]      Pharmacy Information (if different than what is on RX)  Name:  [facility] - [facility] [address] [place] [address], [address]  Phone:  [phone]

## 2022-03-30 DIAGNOSIS — E78.5 HYPERLIPIDEMIA LDL GOAL <100: ICD-10-CM

## 2022-03-30 NOTE — TELEPHONE ENCOUNTER
Central Prior Authorization Team   Phone: [phone]    PA Initiation    Medication: tadalafil (CIALIS) 5 MG tablet  Insurance Company: [employer] ([facility]) - Phone [phone] Fax [phone]  Pharmacy Filling the Rx: [facility] - [facility] [address] [place], [place] [address]E [facility], [address]  Filling Pharmacy Phone: [phone]  Filling Pharmacy Fax: [phone]  Start Date: 3/30/2022

## 2022-03-31 RX ORDER — SIMVASTATIN 20 MG
TABLET ORAL
Qty: 90 TABLET | Refills: 3 | OUTPATIENT
Start: 2022-03-31

## 2022-03-31 NOTE — TELEPHONE ENCOUNTER
Prior Authorization Approval    Authorization Effective Date: 3/30/2022  Authorization Expiration Date: 3/30/2023  Medication: tadalafil (CIALIS) 5 MG-APPROVED  Approved Dose/Quantity:    Reference #:     Insurance Company: [employer] ([facility]) - Phone [phone] Fax [phone]  Expected CoPay:       CoPay Card Available:      Foundation Assistance Needed:    Which Pharmacy is filling the prescription (Not needed for infusion/clinic administered): [facility] - [address], [address]  Pharmacy Notified: Yes  Patient Notified: Yes  VM LEFT FOR PATIENT TO CONTACT MAIL ORDER PHARMACY.

## 2022-04-21 ENCOUNTER — TELEPHONE (OUTPATIENT)
Dept: UROLOGY | Facility: CLINIC | Age: 67
End: 2022-04-21
Payer: COMMERCIAL

## 2022-04-21 NOTE — TELEPHONE ENCOUNTER
I CALLED PT AND TOLD HIM ANOTHER PSA FOR TOMORROW IS NOT NEEDED.  PT JUST HAD PSA 3 WEEKS AGO.  [facility]    Phone Message    May a detailed message be left on voicemail: yes     Reason for Call: Order(s): Other:   Reason for requested: Labs PSA  Date needed: 4/22/22  Provider name: Dr López    Pt had PSA checked during office visit on 3/25 with Dr Vanegas.  If labs not needed tomorrow please give pt a call.         Action Taken: Message routed to:  Other: URO    Travel Screening: Not Applicable

## 2022-04-22 ENCOUNTER — OFFICE VISIT (OUTPATIENT)
Dept: UROLOGY | Facility: CLINIC | Age: 67
End: 2022-04-22
Payer: COMMERCIAL

## 2022-04-22 VITALS
DIASTOLIC BLOOD PRESSURE: 80 MMHG | OXYGEN SATURATION: 97 % | BODY MASS INDEX: 32.47 KG/M2 | HEIGHT: 66 IN | HEART RATE: 59 BPM | SYSTOLIC BLOOD PRESSURE: 135 MMHG | WEIGHT: 202 LBS

## 2022-04-22 DIAGNOSIS — N40.1 BENIGN PROSTATIC HYPERPLASIA WITH URINARY RETENTION: Primary | ICD-10-CM

## 2022-04-22 DIAGNOSIS — R97.20 ELEVATED PROSTATE SPECIFIC ANTIGEN (PSA): ICD-10-CM

## 2022-04-22 DIAGNOSIS — R33.8 BENIGN PROSTATIC HYPERPLASIA WITH URINARY RETENTION: Primary | ICD-10-CM

## 2022-04-22 DIAGNOSIS — N40.2 PROSTATE NODULE: ICD-10-CM

## 2022-04-22 LAB
ALBUMIN UR-MCNC: NEGATIVE MG/DL
APPEARANCE UR: CLEAR
BILIRUB UR QL STRIP: NEGATIVE
COLOR UR AUTO: YELLOW
GLUCOSE UR STRIP-MCNC: NEGATIVE MG/DL
HGB UR QL STRIP: NEGATIVE
KETONES UR STRIP-MCNC: NEGATIVE MG/DL
LEUKOCYTE ESTERASE UR QL STRIP: NEGATIVE
NITRATE UR QL: NEGATIVE
PH UR STRIP: 6 [PH] (ref 5–7)
RESIDUAL VOLUME (RV) (EXTERNAL): 177
SP GR UR STRIP: <=1.005 (ref 1–1.03)
UROBILINOGEN UR STRIP-ACNC: 0.2 E.U./DL

## 2022-04-22 PROCEDURE — 99204 OFFICE O/P NEW MOD 45 MIN: CPT | Mod: 25 | Performed by: STUDENT IN AN ORGANIZED HEALTH CARE EDUCATION/TRAINING PROGRAM

## 2022-04-22 PROCEDURE — 51798 US URINE CAPACITY MEASURE: CPT | Performed by: STUDENT IN AN ORGANIZED HEALTH CARE EDUCATION/TRAINING PROGRAM

## 2022-04-22 PROCEDURE — 81003 URINALYSIS AUTO W/O SCOPE: CPT | Mod: QW | Performed by: STUDENT IN AN ORGANIZED HEALTH CARE EDUCATION/TRAINING PROGRAM

## 2022-04-22 RX ORDER — ASPIRIN 81 MG/1
81 TABLET, COATED ORAL DAILY
COMMUNITY
Start: 2021-10-09 | End: 2022-08-04

## 2022-04-22 RX ORDER — ALFUZOSIN HYDROCHLORIDE 10 MG/1
10 TABLET, EXTENDED RELEASE ORAL DAILY
Qty: 30 TABLET | Refills: 3 | Status: SHIPPED | OUTPATIENT
Start: 2022-04-22 | End: 2022-07-19

## 2022-04-22 ASSESSMENT — PAIN SCALES - GENERAL: PAINLEVEL: NO PAIN (0)

## 2022-04-22 NOTE — PATIENT INSTRUCTIONS
Ultrasound Guided Prostate Biopsy    What is a prostate biopsy? A prostate biopsy is a relatively painless procedure performed in the physician's office, outpatient facility or hospital.  A long, thin needle is inserted into the prostate to collect a sample of tissue from the prostate.  These samples are then examined by a pathologist for abnormal cells.    Why do I need a prostate biopsy? During a man's lifetime the prostate gradually increases in size.  The patient may or may not experience symptoms.  Symptoms of an enlarged prostate include:    Increased frequency of urination    Decreased force of urinary stream    Trouble with urination    Awakening at night to urinate    When the prostate is examined, the physician may feel a nodule (hard or firm growth) which would require a biopsy.    Another reason for having a prostate biopsy is an increase in the prostate specific androgen (PSA) in your blood.  A prostate biopsy may be necessary to determine the cause of the increased PSA.    Your physician will also perform an ultrasound (an image created by sound waves).  The ultrasound is performed by placing a small probe in the rectum to image the prostate gland.    Preparation for the Prostate Biopsy    Blood thinning medications must be stopped prior to your biopsy.  Please stop the following medication the appropriate number of days before:  10 days-acetylsalicylic acid, vitamin E, fish oil, aspirin, baby aspirin  7 days- Plavix, Brilinta, ibuprofen (Advil, Motrin, Aleve)  5 days-Pradaxa  4 days-Coumadin/warfarin  3 days-Eliquis, Xarelto    2.  If you have any bleeding problems (thin blood), please tell your doctor.    3.  If you have been told by another doctor to take antibiotics before dental work or surgery, please tell the doctor.  This is common for patients that have had a joint replacement.    YOU HAVE BEEN PRESCRIBED AN ANTIBIOTIC.  You will start this medication the day before your biopsy. It is one  pill, twice a day.  You will continue taking the medication until it is gone.    The day of the biopsy you will be asked to use an enema one hour before you leave for your appointment.    PLEASE EAT YOUR REGULAR MEALS ON THE DAY OF YOUR BIOPSY.    Unless you have been prescribed a sedative (Valium or a similar drug) you will be able to drive to and from your appointment.  If you have taken a sedative you must have a ride.    AFTER THE BIOPSY    DANGER SIGNS    Small amount of blood in the urine for 10-14 days Excessive blood in the urine, stool or ejaculate  Small amount of blood in the stool for 48 hours Fever over 100 or chills  Small amount of blood in the ejaculate for up to Frequent urination or burning when you urinate  4 weeks          CALL THE DOCTOR'S OFFICE -[phone] IF YOU HAVE ANY OF THESE SYMPTOMS.  IF YOU CANNOT CONTACT THE OFFICE, GO TO THE EMERGENCY ROOM.        Your biopsy is scheduled on Monday, 6/6/22, at our [place] office.  Please be at the office at 3:00PM.    A follow up visit has been scheduled for you on Monday, 6/13/22, at 4:30PM.  This is a virtual visit.  Your doctor will discuss your results with you at this visit.

## 2022-04-22 NOTE — PROGRESS NOTES
[facility]  Main Office: [address]  [address]  [place] [address]       CHIEF COMPLAINT:  BPH with LUTS, elevated psa    HISTORY:   65 yo with worsening BPH with LUTS, elevated PSA    Having urinary frequency, urgency, feels like he has to go again as well    AUA symptom score 5-5-3-4-2-1-2 = 22 severe QOL mostly dissatisfied    Denies gross hematuria    +FH of prostate cancer in father, diagnosed in his 70's, unclear on treatment.    He is on daily tadalafil which doesn't seem to be helping with his urinary symptoms. Has been on this 4-5 years for erectile dysfunction and his BPH symptoms    PAST MEDICAL HISTORY:   Past Medical History:   Diagnosis Date     DDD (degenerative disc disease), lumbar      Unspecified essential hypertension        PAST SURGICAL HISTORY:   Past Surgical History:   Procedure Laterality Date     COLONOSCOPY  2007    Normal, repeat in      COLONOSCOPY N/A 2017    Int Hemorrhoids, o/w normal. Procedure: COLONOSCOPY;  Colonoscopy;  Surgeon: Angeles Vyas MD;  Location:  GI     HERNIORRHAPHY INGUINAL Left 2015    Procedure: HERNIORRHAPHY INGUINAL;  Surgeon: Tyler Michael MD;  Location:  OR     ORTHOPEDIC SURGERY      corn shaved from left foot     TOTAL SHOULDER REPLACEMENT Right 10/2021    Alonzo Lam at [facility] NONSPECIFIC PROCEDURE  ~    Left knee surg for osteochondroma     [facility] NONSPECIFIC PROCEDURE  10/07/2015    Sebaceious cyst removal, left upper arm       FAMILY HISTORY:   Family History   Problem Relation Age of Onset     Cancer Daughter 15        PTLD B-cell large-cell Non-Hodgkin's,  at 16     Family History Negative Mother         Born 1931. Lives in independent living.      Eye Disorder Father         Macular degeneration.      Prostate Cancer Father      Hypertension Father      Neurologic Disorder Father          age 84. Alzheimers, also depression     Depression Father      Arthritis Sister         Born  1959     Heart Disease Daughter 0        twin born  heart transplant 3 months old CVA 1 /3 yo     Cerebrovascular Disease Daughter 1     Family History Negative Son              Family History Negative Daughter         twin born      Heart Disease Maternal Grandmother          66yo MI     Neurologic Disorder Maternal Grandfather          62yo Parkinson's     Heart Disease Paternal Grandmother          69yo      Family History Negative Paternal Grandfather          93yo      Colon Cancer No family hx of        SOCIAL HISTORY:   Social History     Tobacco Use     Smoking status: Passive Smoke Exposure - Never Smoker     Smokeless tobacco: Former User     Quit date: 2005     Tobacco comment: cigar, rarely   Substance Use Topics     Alcohol use: Yes     Comment: <6 drinks/week. May have 2 beers or one mixed drinks on a given day          Allergies   Allergen Reactions     Lisinopril      Cough.         Current Outpatient Medications:      ASPIRIN LOW DOSE 81 MG EC tablet, Take 81 mg by mouth daily, Disp: , Rfl:      simvastatin (ZOCOR) 20 MG tablet, Take 1 tablet (20 mg) by mouth At Bedtime, Disp: 90 tablet, Rfl: 0     tadalafil (CIALIS) 5 MG tablet, Take 1 tablet (5 mg) by mouth daily, Disp: 90 tablet, Rfl: 3     triamterene-HCTZ (MAXZIDE-25) 37.5-25 MG tablet, Take 1 tablet by mouth daily, Disp: 90 tablet, Rfl: 3    Review Of Systems:  Skin: No rash, pruritis, or skin pigmentation  Eyes: No changes in vision  Ears/Nose/Throat: No changes in hearing, no nosebleeds  Respiratory: No shortness of breath, dyspnea on exertion, cough, or hemoptysis  Cardiovascular: No chest pain or palpitations  Gastrointestinal: No diarrhea or constipation. No abdominal pain. No hematochezia  Genitourinary: see HPI  Musculoskeletal: No pain or swelling of joints, normal range of motion  Neurologic: No weakness or tremors  Psychiatric: No recent changes in memory or  "mood  Hematologic/Lymphatic/Immunologic: No easy bruising or enlarged lymph nodes  Endocrine: No weight gain or loss    PHYSICAL EXAM:    /80   Pulse 59   Ht 1.676 m (5' 6\")   Wt 91.6 kg (202 lb)   SpO2 97%   BMI 32.60 kg/m    General appearance: In NAD, conversant  HEENT: Normocephalic and atraumatic, anicteric sclera  Cardiovascular: Not examined  Respiratory: normal, non-labored breathing  Gastrointestinal: negative, Abdomen soft, non-tender, and non-distended.   Musculoskeletal: Not Examined  Peripheral Vascular/extremity: No peripheral edema  Skin: Normal temperature, turgor, and texture. No rash  Psychiatric: Appropriate affect, alert and oriented to person, place, and time    Penis: Not Examined  Scrotal Skin: Not Examined   Testicles: Not Examined  Epididymis: Not Examined  Digital Rectal Exam: 50 gram prostate, nodular and irregular overall but no dominant nodule per se    Cystoscopy: Not done      PSA:   Component PSA   Latest Ref Rng & Units 0.00 - 4.00 ug/L   3/6/2020 3.42   3/12/2021 3.21   3/25/2022 5.20 (H)       UA RESULTS:  Recent Labs   Lab Test 03/25/22  1037   COLOR Yellow   APPEARANCE Clear   URINEGLC Negative   URINEBILI Negative   URINEKETONE Negative   SG 1.020   UBLD Negative   URINEPH 8.0*   PROTEIN Negative   UROBILINOGEN 0.2   NITRITE Negative   LEUKEST Negative       Bladder Scan:  ml     Other Labs:      Imaging Studies: None      CLINICAL IMPRESSION:   65 yo with worsening BPH with LUTS, elevated PSA, nodular prostate on exam    Based on the patient's demographics, the Prostate Biopsy Collaborative Group nomogram indicates 55 percent risk of high-grade prostate cancer and 16 percent risk of low-grade prostate cancer (reference: Tomeka Gage, Keysha Ragsdale, Nohemi De Los Santos, Magaly Mak, Yasmin Wynne, Kevan Vizcaino, Arthur Toribio, et al.  A Contemporary Prostate Biopsy Risk Calculator Based on Multiple Heterogeneous Cohorts.   Urology 74, " no. 2 (2018): 197-203. Doi:[email or web address]/[email or web address].)    Discussed MRI prostate followed by prostate biopsy if MRI concerning. Risks of biopsy including bleeding, infection, false negative discussed    BPH with incomplete bladder emptying - will trial alpha blocker alfuzosin    PLAN:   Start alfuzosin. Use caution when using it along with tadalafil as may cause lightheadedness/dizziness  If PIRADS 3-4-5 -> uronav prostate biopsy  If PIRADS 1-2 -> recheck PSA in 3 months      Luke López MD   [facility]  [phone] clinic phone

## 2022-04-22 NOTE — LETTER
2022       RE: Garrett Childers  [id number]  [facility] [address]     Dear Colleague,    Thank you for referring your patient, Garrett Childers, to the [facility] [place] [facility]. Please see a copy of my visit note below.    [facility]  Main Office: [address]  [address]  [place] [address]       CHIEF COMPLAINT:  BPH with LUTS, elevated psa    HISTORY:   65 yo with worsening BPH with LUTS, elevated PSA    Having urinary frequency, urgency, feels like he has to go again as well    AUA symptom score 5-5-3-4-2-1-2 = 22 severe QOL mostly dissatisfied    Denies gross hematuria    +FH of prostate cancer in father, diagnosed in his 70's, unclear on treatment.    He is on daily tadalafil which doesn't seem to be helping with his urinary symptoms. Has been on this 4-5 years for erectile dysfunction and his BPH symptoms    PAST MEDICAL HISTORY:   Past Medical History:   Diagnosis Date     DDD (degenerative disc disease), lumbar      Unspecified essential hypertension        PAST SURGICAL HISTORY:   Past Surgical History:   Procedure Laterality Date     COLONOSCOPY  2007    Normal, repeat in 2017     COLONOSCOPY N/A 2017    Int Hemorrhoids, o/w normal. Procedure: COLONOSCOPY;  Colonoscopy;  Surgeon: Angeles Vyas MD;  Location:  GI     HERNIORRHAPHY INGUINAL Left 2015    Procedure: HERNIORRHAPHY INGUINAL;  Surgeon: Tyler Michael MD;  Location:  OR     ORTHOPEDIC SURGERY      corn shaved from left foot     TOTAL SHOULDER REPLACEMENT Right 10/2021    [facility]onzo Lam at [facility] NONSPECIFIC PROCEDURE  ~    Left knee surg for osteochondroma     [facility] NONSPECIFIC PROCEDURE  10/07/2015    Sebaceious cyst removal, left upper arm       FAMILY HISTORY:   Family History   Problem Relation Age of Onset     Cancer Daughter 15        PTLD B-cell large-cell Non-Hodgkin's,  at 16     Family History  Negative Mother         Born 1931. Lives in independent living.      Eye Disorder Father         Macular degeneration.      Prostate Cancer Father      Hypertension Father      Neurologic Disorder Father          age 84. Alzheimers, also depression     Depression Father      Arthritis Sister         Born      Heart Disease Daughter 0        twin born  heart transplant 3 months old CVA 1 /3 yo     Cerebrovascular Disease Daughter 1     Family History Negative Son              Family History Negative Daughter         twin born      Heart Disease Maternal Grandmother          64yo MI     Neurologic Disorder Maternal Grandfather          62yo Parkinson's     Heart Disease Paternal Grandmother          69yo      Family History Negative Paternal Grandfather          93yo      Colon Cancer No family hx of        SOCIAL HISTORY:   Social History     Tobacco Use     Smoking status: Passive Smoke Exposure - Never Smoker     Smokeless tobacco: Former User     Quit date: 2005     Tobacco comment: cigar, rarely   Substance Use Topics     Alcohol use: Yes     Comment: <6 drinks/week. May have 2 beers or one mixed drinks on a given day          Allergies   Allergen Reactions     Lisinopril      Cough.         Current Outpatient Medications:      ASPIRIN LOW DOSE 81 MG EC tablet, Take 81 mg by mouth daily, Disp: , Rfl:      simvastatin (ZOCOR) 20 MG tablet, Take 1 tablet (20 mg) by mouth At Bedtime, Disp: 90 tablet, Rfl: 0     tadalafil (CIALIS) 5 MG tablet, Take 1 tablet (5 mg) by mouth daily, Disp: 90 tablet, Rfl: 3     triamterene-HCTZ (MAXZIDE-25) 37.5-25 MG tablet, Take 1 tablet by mouth daily, Disp: 90 tablet, Rfl: 3    Review Of Systems:  Skin: No rash, pruritis, or skin pigmentation  Eyes: No changes in vision  Ears/Nose/Throat: No changes in hearing, no nosebleeds  Respiratory: No shortness of breath, dyspnea on exertion, cough, or hemoptysis  Cardiovascular: No chest  "pain or palpitations  Gastrointestinal: No diarrhea or constipation. No abdominal pain. No hematochezia  Genitourinary: see HPI  Musculoskeletal: No pain or swelling of joints, normal range of motion  Neurologic: No weakness or tremors  Psychiatric: No recent changes in memory or mood  Hematologic/Lymphatic/Immunologic: No easy bruising or enlarged lymph nodes  Endocrine: No weight gain or loss    PHYSICAL EXAM:    /80   Pulse 59   Ht 1.676 m (5' 6\")   Wt 91.6 kg (202 lb)   SpO2 97%   BMI 32.60 kg/m    General appearance: In NAD, conversant  HEENT: Normocephalic and atraumatic, anicteric sclera  Cardiovascular: Not examined  Respiratory: normal, non-labored breathing  Gastrointestinal: negative, Abdomen soft, non-tender, and non-distended.   Musculoskeletal: Not Examined  Peripheral Vascular/extremity: No peripheral edema  Skin: Normal temperature, turgor, and texture. No rash  Psychiatric: Appropriate affect, alert and oriented to person, place, and time    Penis: Not Examined  Scrotal Skin: Not Examined   Testicles: Not Examined  Epididymis: Not Examined  Digital Rectal Exam: 50 gram prostate, nodular and irregular overall but no dominant nodule per se    Cystoscopy: Not done      PSA:   Component PSA   Latest Ref Rng & Units 0.00 - 4.00 ug/L   3/6/2020 3.42   3/12/2021 3.21   3/25/2022 5.20 (H)       UA RESULTS:  Recent Labs   Lab Test 03/25/22  1037   COLOR Yellow   APPEARANCE Clear   URINEGLC Negative   URINEBILI Negative   URINEKETONE Negative   SG 1.020   UBLD Negative   URINEPH 8.0*   PROTEIN Negative   UROBILINOGEN 0.2   NITRITE Negative   LEUKEST Negative       Bladder Scan:  ml     Other Labs:      Imaging Studies: None      CLINICAL IMPRESSION:   65 yo with worsening BPH with LUTS, elevated PSA, nodular prostate on exam    Based on the patient's demographics, the Prostate Biopsy Collaborative Group nomogram indicates 55 percent risk of high-grade prostate cancer and 16 percent risk of " low-grade prostate cancer (reference: Tomeka Gage, Keysha Ragsdale, Nohemi De Los Santos, Magaly Mak, Yasmin Wynne, Kevan Vizcaino, Arthur Toribio, et al.  A Contemporary Prostate Biopsy Risk Calculator Based on Multiple Heterogeneous Cohorts.   Urology 74, no. 2 (2018): 197-203. Doi:[email or web address].)    Discussed MRI prostate followed by prostate biopsy if MRI concerning. Risks of biopsy including bleeding, infection, false negative discussed    BPH with incomplete bladder emptying - will trial alpha blocker alfuzosin    PLAN:   Start alfuzosin. Use caution when using it along with tadalafil as may cause lightheadedness/dizziness  If PIRADS 3-4-5 -> uronav prostate biopsy  If PIRADS 1-2 -> recheck PSA in 3 months      Luke López MD   [facility]  [phone] clinic phone

## 2022-04-22 NOTE — NURSING NOTE
Chief Complaint   Patient presents with     Elevated PSA     Pt here to establish care.     Benign Prostatic Hypertrophy       Pt complains of frequency, wakes every 3-4 hours to urinate.     PVR: 177 mL    Hilda Wilson, CMA

## 2022-05-17 ENCOUNTER — ANCILLARY PROCEDURE (OUTPATIENT)
Dept: MRI IMAGING | Facility: CLINIC | Age: 67
End: 2022-05-17
Attending: STUDENT IN AN ORGANIZED HEALTH CARE EDUCATION/TRAINING PROGRAM
Payer: COMMERCIAL

## 2022-05-17 DIAGNOSIS — R97.20 ELEVATED PROSTATE SPECIFIC ANTIGEN (PSA): ICD-10-CM

## 2022-05-17 DIAGNOSIS — N40.2 PROSTATE NODULE: ICD-10-CM

## 2022-05-17 PROCEDURE — A9585 GADOBUTROL INJECTION: HCPCS | Performed by: RADIOLOGY

## 2022-05-17 PROCEDURE — 72197 MRI PELVIS W/O & W/DYE: CPT | Performed by: RADIOLOGY

## 2022-05-17 RX ORDER — GADOBUTROL 604.72 MG/ML
10 INJECTION INTRAVENOUS ONCE
Status: COMPLETED | OUTPATIENT
Start: 2022-05-17 | End: 2022-05-17

## 2022-05-17 RX ADMIN — GADOBUTROL 9 ML: 604.72 INJECTION INTRAVENOUS at 16:08

## 2022-05-19 DIAGNOSIS — Z79.2 PROPHYLACTIC ANTIBIOTIC: ICD-10-CM

## 2022-05-19 DIAGNOSIS — R97.20 ELEVATED PROSTATE SPECIFIC ANTIGEN (PSA): Primary | ICD-10-CM

## 2022-05-19 RX ORDER — CIPROFLOXACIN 500 MG/1
500 TABLET, FILM COATED ORAL 2 TIMES DAILY
Qty: 6 TABLET | Refills: 0 | Status: SHIPPED | OUTPATIENT
Start: 2022-05-19 | End: 2022-05-22

## 2022-05-23 ENCOUNTER — DOCUMENTATION ONLY (OUTPATIENT)
Dept: LAB | Facility: CLINIC | Age: 67
End: 2022-05-23
Payer: COMMERCIAL

## 2022-05-31 DIAGNOSIS — E78.5 HYPERLIPIDEMIA LDL GOAL <100: ICD-10-CM

## 2022-06-01 ENCOUNTER — LAB (OUTPATIENT)
Dept: LAB | Facility: CLINIC | Age: 67
End: 2022-06-01
Payer: COMMERCIAL

## 2022-06-01 DIAGNOSIS — E78.5 HYPERLIPIDEMIA LDL GOAL <130: ICD-10-CM

## 2022-06-01 LAB
ALBUMIN SERPL-MCNC: 3.9 G/DL (ref 3.4–5)
ALP SERPL-CCNC: 67 U/L (ref 40–150)
ALT SERPL W P-5'-P-CCNC: 39 U/L (ref 0–70)
ANION GAP SERPL CALCULATED.3IONS-SCNC: 5 MMOL/L (ref 3–14)
AST SERPL W P-5'-P-CCNC: 28 U/L (ref 0–45)
BILIRUB SERPL-MCNC: 0.6 MG/DL (ref 0.2–1.3)
BUN SERPL-MCNC: 17 MG/DL (ref 7–30)
CALCIUM SERPL-MCNC: 9.5 MG/DL (ref 8.5–10.1)
CHLORIDE BLD-SCNC: 107 MMOL/L (ref 94–109)
CHOLEST SERPL-MCNC: 192 MG/DL
CO2 SERPL-SCNC: 28 MMOL/L (ref 20–32)
CREAT SERPL-MCNC: 0.82 MG/DL (ref 0.66–1.25)
FASTING STATUS PATIENT QL REPORTED: YES
GFR SERPL CREATININE-BSD FRML MDRD: >90 ML/MIN/1.73M2
GLUCOSE BLD-MCNC: 100 MG/DL (ref 70–99)
HDLC SERPL-MCNC: 46 MG/DL
LDLC SERPL CALC-MCNC: 105 MG/DL
NONHDLC SERPL-MCNC: 146 MG/DL
POTASSIUM BLD-SCNC: 4.8 MMOL/L (ref 3.4–5.3)
PROT SERPL-MCNC: 7.6 G/DL (ref 6.8–8.8)
SODIUM SERPL-SCNC: 140 MMOL/L (ref 133–144)
TRIGL SERPL-MCNC: 205 MG/DL

## 2022-06-01 PROCEDURE — 80053 COMPREHEN METABOLIC PANEL: CPT

## 2022-06-01 PROCEDURE — 80061 LIPID PANEL: CPT

## 2022-06-01 PROCEDURE — 36415 COLL VENOUS BLD VENIPUNCTURE: CPT

## 2022-06-02 ENCOUNTER — MYC REFILL (OUTPATIENT)
Dept: INTERNAL MEDICINE | Facility: CLINIC | Age: 67
End: 2022-06-02
Payer: COMMERCIAL

## 2022-06-02 DIAGNOSIS — E78.5 HYPERLIPIDEMIA LDL GOAL <100: ICD-10-CM

## 2022-06-02 RX ORDER — SIMVASTATIN 20 MG
TABLET ORAL
Qty: 90 TABLET | Refills: 2 | Status: SHIPPED | OUTPATIENT
Start: 2022-06-02 | End: 2022-11-21

## 2022-06-02 NOTE — TELEPHONE ENCOUNTER
Prescription approved per [facility] Refill Protocol.    Patient had lipids drawn yesterday.

## 2022-06-03 RX ORDER — SIMVASTATIN 20 MG
20 TABLET ORAL AT BEDTIME
Qty: 90 TABLET | Refills: 0 | OUTPATIENT
Start: 2022-06-03

## 2022-06-03 NOTE — TELEPHONE ENCOUNTER
Medication refused, filled until due for next office visit in March of 2023. Patient wanted medication filled to June 2023 as he just had lipids completed.

## 2022-06-06 ENCOUNTER — OFFICE VISIT (OUTPATIENT)
Dept: UROLOGY | Facility: CLINIC | Age: 67
End: 2022-06-06
Payer: COMMERCIAL

## 2022-06-06 VITALS
DIASTOLIC BLOOD PRESSURE: 64 MMHG | BODY MASS INDEX: 27.17 KG/M2 | HEART RATE: 68 BPM | OXYGEN SATURATION: 96 % | WEIGHT: 205 LBS | SYSTOLIC BLOOD PRESSURE: 124 MMHG | HEIGHT: 73 IN

## 2022-06-06 DIAGNOSIS — N40.2 PROSTATE NODULE: ICD-10-CM

## 2022-06-06 DIAGNOSIS — Z79.2 PROPHYLACTIC ANTIBIOTIC: Primary | ICD-10-CM

## 2022-06-06 DIAGNOSIS — R97.20 ELEVATED PROSTATE SPECIFIC ANTIGEN (PSA): ICD-10-CM

## 2022-06-06 PROCEDURE — 76942 ECHO GUIDE FOR BIOPSY: CPT | Performed by: STUDENT IN AN ORGANIZED HEALTH CARE EDUCATION/TRAINING PROGRAM

## 2022-06-06 PROCEDURE — 88305 TISSUE EXAM BY PATHOLOGIST: CPT | Performed by: PATHOLOGY

## 2022-06-06 PROCEDURE — 96372 THER/PROPH/DIAG INJ SC/IM: CPT | Mod: 59 | Performed by: STUDENT IN AN ORGANIZED HEALTH CARE EDUCATION/TRAINING PROGRAM

## 2022-06-06 PROCEDURE — 55700 PR BIOPSY OF PROSTATE,NEEDLE/PUNCH: CPT | Performed by: STUDENT IN AN ORGANIZED HEALTH CARE EDUCATION/TRAINING PROGRAM

## 2022-06-06 RX ORDER — LIDOCAINE HYDROCHLORIDE 10 MG/ML
14 INJECTION, SOLUTION INFILTRATION; PERINEURAL ONCE
Status: COMPLETED | OUTPATIENT
Start: 2022-06-06 | End: 2022-06-06

## 2022-06-06 RX ORDER — GENTAMICIN 40 MG/ML
80 INJECTION, SOLUTION INTRAMUSCULAR; INTRAVENOUS ONCE
Status: COMPLETED | OUTPATIENT
Start: 2022-06-06 | End: 2022-06-06

## 2022-06-06 RX ADMIN — LIDOCAINE HYDROCHLORIDE 14 ML: 10 INJECTION, SOLUTION INFILTRATION; PERINEURAL at 15:30

## 2022-06-06 RX ADMIN — GENTAMICIN 80 MG: 40 INJECTION, SOLUTION INTRAMUSCULAR; INTRAVENOUS at 15:01

## 2022-06-06 NOTE — PATIENT INSTRUCTIONS
[facility]  Transrectal Ultrasound  Post Operative Information    The physician who performed your Transrectal Ultrasound is Dr. López (telephone number [phone]).  Please contact this doctor if you have any problems or questions.  If unable to reach your doctor, please return to the Emergency Department.    Take one antibiotic the evening of the procedure and then as directed on your prescription.  Drink at least 6-8 glasses of fluids for the first 48 hours.  Avoid heavy lifting and strenuous activity for 48 hours.  Avoid sexual intercourse for the first 24 hours.  No aspirin or ibuprofen products (Motrin, Advil, Nuprin, ect.) for one week.  You may take acetaminophen (Tylenol) for pain.  You may notice a small amount of blood on the tissue after a bowel movement.  You may pass blood with clots in your urine following the procedure.  The amount will decrease with time but may be visible for up to two weeks.   You make have blood in your semen for 4 weeks after the procedure.  You may experience mild perineal (groin area) discomfort after the procedure.  Please call you doctor if you have any of the follow symptoms:  Fever  Increase in the amount of blood passed  Severe discomfort or pain

## 2022-06-06 NOTE — PROGRESS NOTES
PREPROCEDURE DIAGNOSIS: Elevated PSA.     POSTPROCEDURE DIAGNOSIS: Elevated PSA.     PROCEDURE: Transrectal ultrasound sizing and MRI - transrectal ultrasound fusion (Uronav) guided prostatic needle biopsy    SURGEON: Luke López MD    ANESTHESIA: 7.5 mL of 1% lidocaine periprostatic block bilaterally     DESCRIPTION OF PROCEDURE: The procedure, the outcome, the anesthesia, and the risks were discussed with the patient. Informed consent was obtained and signed and a timeout was completed prior to the procedure. Digital rectal examination was performed with the below findings noted. Anesthesia was administered as noted above and the transrectal ultrasound probe was inserted, sizing was performed, MRI-TRUS fusion registration was performed and the below findings were noted. 15 core biopsies were taken as described below. The probe was then withdrawn. Patient tolerated the procedure well.     FINDINGS: Digital rectal exam reveals nodularity in the midgland. Total volume is 40 mL. Then 15 core biopsies were taken with 3 of the MRI target, one at each mid and lateral base, mid and apical regions of the prostate bilaterally.    PLAN: We will release the results on mob[employer]Think as soon as they are available. The patient understands that the pathology results may inform him that he has prostate cancer. He will return in about a week for a scheduled discussion of the results     Luke López MD   [facility]rology  [phone] clinic phone

## 2022-06-06 NOTE — NURSING NOTE
Chief Complaint   Patient presents with     Elevated PSA   he following medication was given:     MEDICATION:  Lidocaine 1% Soln  ROUTE: Local Infiltration   SITE: Prostate  DOSE: 140mg/14ml   LOT #: [id number]  : [employer]  EXPIRATION DATE: 01/2024  NDC#: 50104-283-57  Was there drug waste? Yes  Amount of drug waste (mL): 6.  Reason for waste:  As per MD  Multi-dose vial: Yes    Giana Zhou LPN

## 2022-06-06 NOTE — NURSING NOTE
The following medication was given:     MEDICATION: Gentamycin    ROUTE: IM  SITE: LUQ - Gluteus  DOSE: 80mg  LOT #: [id number]  :  [employer]  EXPIRATION DATE:  03/23  NDC#: 816923335     No drug wasted     Here for  Prostate biopsy  Fusion  MRI and ultrasound . He has held is blood thinner  X 10 days . He began  His Cipro yesterday and took his fleets enema before arriving

## 2022-06-06 NOTE — LETTER
6/6/2022       RE: Garrett Childers  [address] [facility] [address]     Dear Colleague,    Thank you for referring your patient, Garrett Childers, to the [facility] [place] at [facility]. Please see a copy of my visit note below.    PREPROCEDURE DIAGNOSIS: Elevated PSA.     POSTPROCEDURE DIAGNOSIS: Elevated PSA.     PROCEDURE: Transrectal ultrasound sizing and MRI - transrectal ultrasound fusion (Uronav) guided prostatic needle biopsy    SURGEON: Luke López MD    ANESTHESIA: 7.5 mL of 1% lidocaine periprostatic block bilaterally     DESCRIPTION OF PROCEDURE: The procedure, the outcome, the anesthesia, and the risks were discussed with the patient. Informed consent was obtained and signed and a timeout was completed prior to the procedure. Digital rectal examination was performed with the below findings noted. Anesthesia was administered as noted above and the transrectal ultrasound probe was inserted, sizing was performed, MRI-TRUS fusion registration was performed and the below findings were noted. 15 core biopsies were taken as described below. The probe was then withdrawn. Patient tolerated the procedure well.     FINDINGS: Digital rectal exam reveals nodularity in the midgland. Total volume is 40 mL. Then 15 core biopsies were taken with 3 of the MRI target, one at each mid and lateral base, mid and apical regions of the prostate bilaterally.    PLAN: We will release the results on [employer] as soon as they are available. The patient understands that the pathology results may inform him that he has prostate cancer. He will return in about a week for a scheduled discussion of the results     Luke López MD   [facility]    [phone] clinic phone

## 2022-06-08 LAB
PATH REPORT.COMMENTS IMP SPEC: ABNORMAL
PATH REPORT.COMMENTS IMP SPEC: ABNORMAL
PATH REPORT.COMMENTS IMP SPEC: YES
PATH REPORT.FINAL DX SPEC: ABNORMAL
PATH REPORT.GROSS SPEC: ABNORMAL
PATH REPORT.MICROSCOPIC SPEC OTHER STN: ABNORMAL
PATH REPORT.RELEVANT HX SPEC: ABNORMAL
PHOTO IMAGE: ABNORMAL

## 2022-06-13 ENCOUNTER — VIRTUAL VISIT (OUTPATIENT)
Dept: UROLOGY | Facility: CLINIC | Age: 67
End: 2022-06-13
Payer: COMMERCIAL

## 2022-06-13 VITALS — HEIGHT: 73 IN | BODY MASS INDEX: 27.17 KG/M2 | WEIGHT: 205 LBS

## 2022-06-13 DIAGNOSIS — K40.90 RIGHT INGUINAL HERNIA: ICD-10-CM

## 2022-06-13 DIAGNOSIS — C61 PROSTATE CANCER (H): Primary | ICD-10-CM

## 2022-06-13 PROCEDURE — 99215 OFFICE O/P EST HI 40 MIN: CPT | Mod: GT | Performed by: STUDENT IN AN ORGANIZED HEALTH CARE EDUCATION/TRAINING PROGRAM

## 2022-06-13 ASSESSMENT — PAIN SCALES - GENERAL: PAINLEVEL: NO PAIN (0)

## 2022-06-13 NOTE — LETTER
"6/13/2022       RE: Garrett Childers  [address] [facility] [address]     Dear Colleague,    Thank you for referring your patient, Garrett Childers, to the [facility] [place] at [facility]. Please see a copy of my visit note below.    *PT WILL MEET YOU IN MYCHART    Kaleb is a 66 year old who is being evaluated via a billable video visit.      How would you like to obtain your AVS? [facility]  If the video visit is dropped, the invitation should be resent by: Text to cell phone: [phone]  Will anyone else be joining your video visit? No    Video Start Time: 4:45 PM    CHIEF COMPLAINT   Garrett Childers who is a 66 year old male returns today for follow-up of new diagnosis of prostate cancer.      HPI   Garrett Childers is a 66 year old male who presents with a history of BPH with LUTS, + FH prostate cancer in father, BPH with LUTS, new diagnosis of prostate cancer (iPSA 5.20, Gl 3+4=7, 4/15 cores).    He has incidentally found right inguinal hernia on prostate MRI    alfuzosin working well for symptoms    PHYSICAL EXAM  Patient is a 66 year old  male   Vitals: Height 1.848 m (6' 0.75\"), weight 93 kg (205 lb).  Body mass index is 27.23 kg/m .  General Appearance Adult:   Alert, no acute distress, oriented  HENT: throat/mouth:normal, good dentition  Lungs: no respiratory distress, or pursed lip breathing  Heart: No obvious jugular venous distension present  Musculoskeltal: extremities normal, no peripheral edema  Skin: no suspicious lesions or rashes  Neuro: Alert, oriented, speech and mentation normal  Psych: affect and mood normal  Gait: Normal  All pertinent imaging reviewed:      MRI   IMPRESSION:  1. Based on the most suspicious abnormality, this exam is  characterized as PIRADS 5 - Clinically significant cancer is highly  likely to be present.  The most suspicious abnormality is located at  the anterior, left greater than right mid gland " transition and  peripheral zone and there is high suspicion of extraprostatic  extension.  2. No suspicious adenopathy or evidence of pelvic metastases.    Surgical pathology 6/6/2022    A.  Prostate, left lateral base, core biopsy:  -No evidence of high-grade prostatic intraepithelial neoplasia or invasive carcinoma, prominent stromal hyperplasia present     B.  Prostate, left base, core biopsy:  -No evidence of high-grade prostatic intraepithelial neoplasia or invasive carcinoma     C.  Prostate, left lateral mid, core biopsy:  -No evidence of high-grade prostatic intraepithelial neoplasia or invasive carcinoma     D.  Prostate, left mid, core biopsy:  -No evidence of high-grade prostatic intraepithelial neoplasia or invasive carcinoma     E.  Prostate, left lateral apex, core biopsy:  -No evidence of high-grade prostatic intraepithelial neoplasia or invasive carcinoma     F.  Prostate, left apex,core biopsy:  -No evidence of high-grade prostatic intraepithelial neoplasia or invasive carcinoma     G.  Prostate, right lateral base, core biopsy:  -No evidence of high-grade prostatic intraepithelial neoplasia or invasive carcinoma     H.  Prostate, right base, core biopsy:  -No evidence of high-grade prostatic intraepithelial neoplasia or invasive carcinoma     I.  Prostate, right lateral mid, core biopsy:  -No evidence of high-grade prostatic intraepithelial neoplasia or invasive carcinoma     J.  Prostate, right mid, core biopsy:  -No evidence of high-grade prostatic intraepithelial neoplasia or invasive carcinoma     K.  Prostate, right lateral apex, core biopsy:  - Adenocarcinoma, acinar type, grade group 1 (Edenton score 3 + 3 = 6 of 10).  - Tumor involves 1 of 1 tissue fragment(s) (approximately 10% of biopsy tissue).  - Intraductal carcinoma: Absent  - Perineural invasion not identified.      L.  Prostate, right apex, core biopsy:  -No evidence of high-grade prostatic intraepithelial neoplasia or invasive  carcinoma     M.  Prostate, target lesion x3, core biopsies:  - Adenocarcinoma, acinar type, grade group 2 (Sadie score 3 + 4 = 7 of 10).  - Tumor involves 3 of 3 tissue fragment(s) (approximately 40% of biopsy tissue).  - Proportion of Sadie pattern 4: 5%  - Intraductal carcinoma: Absent  - Perineural invasion not identified.     Prostate MRI 5/17/2022  FINDINGS:  Size: 39 grams    Lesion 1:  Location: Bilateral, left greater than right, mid gland peripheral and  transition zone at the 11-2 o'clock position relative to the urethra.  Series 7 image 50.  There is involvement of the anterior fibromuscular  stroma.  Size: 19 mm  T2 description: Homogeneously hypointense  T2 numerical assessment: 5  DWI description: Focal diffusion restriction  DWI numerical assessment: 5  DCE assessment: Positive    Prostate margin: Capsular abutment>15 mm with focal bulging  Lesion overall PI-RADS category: 5     Neurovascular bundles: No neurovascular bundle involvement by  malignancy.  Seminal vesicles: No seminal vesicle involvement by malignancy.  Lymph nodes: No lymph node involvement  Bones: No suspicious lesions  Other pelvic organs: Phlebolith in the left pelvis. Right  fat-containing inguinal hernia.                                                                         IMPRESSION:  1. Based on the most suspicious abnormality, this exam is  characterized as PIRADS 5 - Clinically significant cancer is highly  likely to be present.  The most suspicious abnormality is located at  the anterior, left greater than right mid gland transition and  peripheral zone and there is high suspicion of extraprostatic  extension.  2. No suspicious adenopathy or evidence of pelvic metastases.        ASSESSMENT and PLAN  66 year old male who presents with a history of BPH with LUTS, + FH prostate cancer in father, BPH with LUTS, new diagnosis of prostate cancer (iPSA 5.20, Gl 3+4=7, 4/15 cores).    We had an extensive discussion about the  significance of clinically localized intermediate risk prostate cancer. We discussed the options for treatment of a localized prostate cancer including active surveillance, brachytherapy, external beam radiation therapy, and surgical removal. We discussed that based on his Omaha score and the the prostate MRI shows large PIRADS 5 lesion anteriorly with concern for capsular bulging he would not be an ideal candidate for active surveillance.       We discussed the relative merits of robotic assisted radical prostatectomy. We also discussed the advantages and disadvantages and roles of open surgery vs. laparoscopic (and Da Jesus assisted) surgery. The anticipated post-operative course was explained, including an anticipated 1-2 day hospital stay. Catheter will remain in place 10-14 days.      We discussed that there was no clear evidence of advantage between surgery and radiation with regard to risk of recurrence. Regarding radiation, we discussed risks including but not limited to cancer recurrence, exacerbation of voiding symptoms or hematuria, urinary retention, incontinence, stricture of the urinary tract, induction of second malignancy, and risks of rectal symptoms or bleeding.  We discussed fact that rectal symptoms may be more common with radiation than with other treatment modalities. With radiation therapy, we also discussed the difficulties in diagnosing recurrent disease at an early stage due to variability in PSA levels and the fact that most patients are not candidates for local salvage therapy when biochemical recurrence is declared.  We also discussed the significant morbidity of post-radiation local salvage therapy in terms of perioperative complications, erectile dysfunction and urinary incontinence. With surgery, we also discussed the potential advantage over radiation therapy in that biochemical recurrence can be detected at a relatively earlier stage and that salvage radiotherapy is successful in  controlling recurrent disease in a substantial proportion of patients.  We also discussed that salvage radiotherapy was associated with a considerably more favorable morbidity profile compared to local salvage therapies for recurrent disease post-radiation.      We discussed option for further discussion with radiation oncology. He defers this      The risks, benefits, alternatives, and personnel involved with robotic prostatectomy were discussed in detail. Specifically, we discussed that risks include but are not limited to anesthetic complications including stroke, MI, DVT/PE, as well as risk of bleeding requiring transfusion, bowel injury, infection, lymphocele, ureteral injury, nerve injury, urine leak and other potentially unforseen complications. Also discussed the risk of bladder neck contracture and other urinary symptoms after procedure. In addition, we discussed risk long-term of urinary incontinence and erectile dysfunction following the procedure. Finally, we discussed risk for adverse pathologic features, including positive surgical margins and potential for post-surgical radiation, hormone ablation and long-term need for PSA monitoring.  All questions were answered in detail.     Re: erectile dysfunction, we would plan for bilateral nerve sparing given the imaging findings of anterior tumor    Patient wishes to proceed with robotic assisted laparoscopic robotic prostatectomy with bilateral pelvic lymph node dissection    He has incidentally found right inguinal hernia on MRI. Will refer to general surgery for consideration of combination robotic inguinal hernia repair under same anesthesia. He has seen Dr. Michael for left inguinal hernia which was repaired in open fashion 2015. Will refer to Dr. Gibbs given Dr. Michael's imminent [facility]      Luke López MD   [facility]  [facility] Phone: [phone]    Video-Visit Details    Type of service:  Video  Visit    Video End Time:5:13 PM    Originating Location (pt. Location): Home    Distant Location (provider location):  [facility] [place]     Platform used for Video Visit: [employer]     40 minutes spent on this encounter including review of pathology, images, discussion with patient, documentation, coordinating care

## 2022-06-16 ENCOUNTER — OFFICE VISIT (OUTPATIENT)
Dept: SURGERY | Facility: CLINIC | Age: 67
End: 2022-06-16
Payer: COMMERCIAL

## 2022-06-16 ENCOUNTER — TELEPHONE (OUTPATIENT)
Dept: SURGERY | Facility: CLINIC | Age: 67
End: 2022-06-16

## 2022-06-16 ENCOUNTER — PREP FOR PROCEDURE (OUTPATIENT)
Dept: SURGERY | Facility: CLINIC | Age: 67
End: 2022-06-16

## 2022-06-16 VITALS
DIASTOLIC BLOOD PRESSURE: 72 MMHG | BODY MASS INDEX: 27.17 KG/M2 | WEIGHT: 205 LBS | HEART RATE: 67 BPM | HEIGHT: 73 IN | SYSTOLIC BLOOD PRESSURE: 126 MMHG | OXYGEN SATURATION: 96 % | RESPIRATION RATE: 16 BRPM

## 2022-06-16 DIAGNOSIS — K40.90 RIGHT INGUINAL HERNIA: Primary | ICD-10-CM

## 2022-06-16 PROCEDURE — 99203 OFFICE O/P NEW LOW 30 MIN: CPT | Performed by: SURGERY

## 2022-06-16 NOTE — LETTER
"2022    RE: Garrett Childers, : 1955      Garrett is a 66 year old male who presents for evaluation for inguinal hernia. This was discovered in the midst of a workup for a newly diagnosed prostate malignancy. He has had a prior left inguinal hernia repair with mesh by Dr Michael. He had not noted much in the way of symptoms until he was made aware of the hernia. The patient has noticed a bulge. He has not had any other abdominal operations besides the other hernia repair. 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     Constipation No   Dysuria No  Cough No     Pt's chart has been reviewed for PMH, PSH, allergies, medications, social history and family history.     ROS:  Pulm:  No shortness of breath, dyspnea on exertion, cough, or hemoptysis  CV:  negative  ABD:  See chief complaint  :  Negative  All other systems negative/normal     Resp 16   Ht 1.854 m (6' 1\")   Wt 93 kg (205 lb)   BMI 27.05 kg/m    Physical exam:  Patient able to get up on table without difficulty.  Abdomen is abdomen is soft without significant tenderness, masses, organomegaly or guarding  bowel sounds are positive and no caput medusa noted.  Hernia  Left inguinal hernia is not present with valsalva, there is a healed scar on this side              Right inguinal hernia is present with valsalva              The hernia is reducible and is mildly tender on exam              Testicles are normal     Assesment: right inguinal hernia     Plan: The nature of hernias, anatomic considerations, indications and approaches to repair were discussed.  Risks of operative intervention were discussed including infection, bleeding, harm to structures including vessels and nerves as well as recurrence, which is about 5% per decade of life.  Post operative recuperation and activity limitations were reviewed as well.  The patient is interested in pursuing repair and we will assist in scheduling this with him. He is to have a " [FreeTextEntry1] : Ms. Almaraz presents to the office for surgical consultation regarding worsening rectal prolapse.\par In office today, while straining on the toilet, she was able to demonstrate a 2 cm rectal prolapse which was readily reducible.\par I have discussed with the patient and the  that she is indeed a candidate for laparoscopic possible open rectopexy +/- resection.\par As the current covid crisis prevents us from scheduling this surgery, which is otherwise deemed elective, for the time being, she should focus on improving her bowel regimen through a combination of fiber, water and miralax. A handout was provided for guidance.\par She was also reassured that she is not a worst case scenario as her prolapse does not occur with cough, strain or ambulation and has not become incarcerated.\par She will work on improving her bowel regimen with intention to return to office in ~ 6 weeks to assess for improvement and for surgical scheduling.\par All questions answered. DaVinci/robotic prostatectomy sometime in mid to late July. I have reviewed the literature on a concomitant hernia repair with mesh and discussed the situation with Dr López and we have both concluded that this plan is safe and reasonable. We will work with his office to coordinate the operation.        Rizwan Mcmahon MD

## 2022-06-16 NOTE — PROGRESS NOTES
"Garrett is a 66 year old male who presents for evaluation for inguinal hernia. This was discovered in the midst of a workup for a newly diagnosed prostate malignancy. He has had a prior left inguinal hernia repair with mesh by Dr Michael. He had not noted much in the way of symptoms until he was made aware of the hernia. The patient has noticed a bulge. He has not had any other abdominal operations besides the other hernia repair. 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    Constipation No   Dysuria No  Cough No    Pt's chart has been reviewed for PMH, PSH, allergies, medications, social history and family history.    ROS:  Pulm:  No shortness of breath, dyspnea on exertion, cough, or hemoptysis  CV:  negative  ABD:  See chief complaint  :  Negative  All other systems negative/normal    Resp 16   Ht 1.854 m (6' 1\")   Wt 93 kg (205 lb)   BMI 27.05 kg/m    Physical exam:  Patient able to get up on table without difficulty.  Abdomen is abdomen is soft without significant tenderness, masses, organomegaly or guarding  bowel sounds are positive and no caput medusa noted.  Hernia  Left inguinal hernia is not present with valsalva, there is a healed scar on this side              Right inguinal hernia is present with valsalva              The hernia is reducible and is mildly tender on exam              Testicles are normal    Assesment: right inguinal hernia    Plan: The nature of hernias, anatomic considerations, indications and approaches to repair were discussed.  Risks of operative intervention were discussed including infection, bleeding, harm to structures including vessels and nerves as well as recurrence, which is about 5% per decade of life.  Post operative recuperation and activity limitations were reviewed as well.  The patient is interested in pursuing repair and we will assist in scheduling this with him. He is to have a DaVinci/robotic prostatectomy sometime in mid to late July. I have " reviewed the literature on a concomitant hernia repair with mesh and discussed the situation with Dr López and we have both concluded that this plan is safe and reasonable. We will work with his office to coordinate the operation.      Rizwan Mcmahon MD  6/16/2022 11:34 AM    Please route or send letter to:  Primary Care Provider (PCP) and Referring Provider

## 2022-06-16 NOTE — TELEPHONE ENCOUNTER
Type of surgery: ROBOTIC ASSISTED RIGHT INGUINAL HENRIA REPAIR WITH MESH    Location of surgery: [facility]  Date and time of surgery: 8/5/2022  11:45 AM   Surgeon: ADELAIDA URIARTE MD    Pre-Op Appt Date: PATIENT TO SCHEDULE    Post-Op Appt Date: PATIENT TO SCHEDULE     Packet sent out: Yes  Pre-cert/Authorization completed:  Not Applicable  Date: 6/16/2022       coord case DR THOMAS  1. Xi robotic assisted laparoscopic radical prostatectomy with bilateral pelvic lymph node dissection possible open 315 min req... RMO TO DO ROBOTIC ASSISTED RIGHT INGUINAL HENRIA REPAIR WITH MESH     GENERAL PT INST TO HAVE H&P WITH DR KAUFMAN 60 MIN REQ PA ASSIST RMO NMS

## 2022-06-27 NOTE — PROGRESS NOTES
"*PT WILL MEET YOU IN MYCHART    Kaleb is a 66 year old who is being evaluated via a billable video visit.      How would you like to obtain your AVS? [employer]  If the video visit is dropped, the invitation should be resent by: Text to cell phone: [phone]  Will anyone else be joining your video visit? No    Video Start Time: 4:45 PM    CHIEF COMPLAINT   Garrett Childers who is a 66 year old male returns today for follow-up of new diagnosis of prostate cancer.      HPI   Garrett Childers is a 66 year old male who presents with a history of BPH with LUTS, + FH prostate cancer in father, BPH with LUTS, new diagnosis of prostate cancer (iPSA 5.20, Gl 3+4=7, 4/15 cores).    He has incidentally found right inguinal hernia on prostate MRI    alfuzosin working well for symptoms    PHYSICAL EXAM  Patient is a 66 year old  male   Vitals: Height 1.848 m (6' 0.75\"), weight 93 kg (205 lb).  Body mass index is 27.23 kg/m .  General Appearance Adult:   Alert, no acute distress, oriented  HENT: throat/mouth:normal, good dentition  Lungs: no respiratory distress, or pursed lip breathing  Heart: No obvious jugular venous distension present  Musculoskeltal: extremities normal, no peripheral edema  Skin: no suspicious lesions or rashes  Neuro: Alert, oriented, speech and mentation normal  Psych: affect and mood normal  Gait: Normal  All pertinent imaging reviewed:      MRI   IMPRESSION:  1. Based on the most suspicious abnormality, this exam is  characterized as PIRADS 5 - Clinically significant cancer is highly  likely to be present.  The most suspicious abnormality is located at  the anterior, left greater than right mid gland transition and  peripheral zone and there is high suspicion of extraprostatic  extension.  2. No suspicious adenopathy or evidence of pelvic metastases.    Surgical pathology 6/6/2022    A.  Prostate, left lateral base, core biopsy:  -No evidence of high-grade prostatic intraepithelial neoplasia or invasive " carcinoma, prominent stromal hyperplasia present     B.  Prostate, left base, core biopsy:  -No evidence of high-grade prostatic intraepithelial neoplasia or invasive carcinoma     C.  Prostate, left lateral mid, core biopsy:  -No evidence of high-grade prostatic intraepithelial neoplasia or invasive carcinoma     D.  Prostate, left mid, core biopsy:  -No evidence of high-grade prostatic intraepithelial neoplasia or invasive carcinoma     E.  Prostate, left lateral apex, core biopsy:  -No evidence of high-grade prostatic intraepithelial neoplasia or invasive carcinoma     F.  Prostate, left apex,core biopsy:  -No evidence of high-grade prostatic intraepithelial neoplasia or invasive carcinoma     G.  Prostate, right lateral base, core biopsy:  -No evidence of high-grade prostatic intraepithelial neoplasia or invasive carcinoma     H.  Prostate, right base, core biopsy:  -No evidence of high-grade prostatic intraepithelial neoplasia or invasive carcinoma     I.  Prostate, right lateral mid, core biopsy:  -No evidence of high-grade prostatic intraepithelial neoplasia or invasive carcinoma     J.  Prostate, right mid, core biopsy:  -No evidence of high-grade prostatic intraepithelial neoplasia or invasive carcinoma     K.  Prostate, right lateral apex, core biopsy:  - Adenocarcinoma, acinar type, grade group 1 (Newton score 3 + 3 = 6 of 10).  - Tumor involves 1 of 1 tissue fragment(s) (approximately 10% of biopsy tissue).  - Intraductal carcinoma: Absent  - Perineural invasion not identified.      L.  Prostate, right apex, core biopsy:  -No evidence of high-grade prostatic intraepithelial neoplasia or invasive carcinoma     M.  Prostate, target lesion x3, core biopsies:  - Adenocarcinoma, acinar type, grade group 2 (Newton score 3 + 4 = 7 of 10).  - Tumor involves 3 of 3 tissue fragment(s) (approximately 40% of biopsy tissue).  - Proportion of Newton pattern 4: 5%  - Intraductal carcinoma: Absent  - Perineural  invasion not identified.     Prostate MRI 5/17/2022  FINDINGS:  Size: 39 grams    Lesion 1:  Location: Bilateral, left greater than right, mid gland peripheral and  transition zone at the 11-2 o'clock position relative to the urethra.  Series 7 image 50.  There is involvement of the anterior fibromuscular  stroma.  Size: 19 mm  T2 description: Homogeneously hypointense  T2 numerical assessment: 5  DWI description: Focal diffusion restriction  DWI numerical assessment: 5  DCE assessment: Positive    Prostate margin: Capsular abutment>15 mm with focal bulging  Lesion overall PI-RADS category: 5     Neurovascular bundles: No neurovascular bundle involvement by  malignancy.  Seminal vesicles: No seminal vesicle involvement by malignancy.  Lymph nodes: No lymph node involvement  Bones: No suspicious lesions  Other pelvic organs: Phlebolith in the left pelvis. Right  fat-containing inguinal hernia.                                                                         IMPRESSION:  1. Based on the most suspicious abnormality, this exam is  characterized as PIRADS 5 - Clinically significant cancer is highly  likely to be present.  The most suspicious abnormality is located at  the anterior, left greater than right mid gland transition and  peripheral zone and there is high suspicion of extraprostatic  extension.  2. No suspicious adenopathy or evidence of pelvic metastases.        ASSESSMENT and PLAN  66 year old male who presents with a history of BPH with LUTS, + FH prostate cancer in father, BPH with LUTS, new diagnosis of prostate cancer (iPSA 5.20, Gl 3+4=7, 4/15 cores).    We had an extensive discussion about the significance of clinically localized intermediate risk prostate cancer. We discussed the options for treatment of a localized prostate cancer including active surveillance, brachytherapy, external beam radiation therapy, and surgical removal. We discussed that based on his Sadie score and the the prostate  MRI shows large PIRADS 5 lesion anteriorly with concern for capsular bulging he would not be an ideal candidate for active surveillance.       We discussed the relative merits of robotic assisted radical prostatectomy. We also discussed the advantages and disadvantages and roles of open surgery vs. laparoscopic (and Da Jesus assisted) surgery. The anticipated post-operative course was explained, including an anticipated 1-2 day hospital stay. Catheter will remain in place 10-14 days.      We discussed that there was no clear evidence of advantage between surgery and radiation with regard to risk of recurrence. Regarding radiation, we discussed risks including but not limited to cancer recurrence, exacerbation of voiding symptoms or hematuria, urinary retention, incontinence, stricture of the urinary tract, induction of second malignancy, and risks of rectal symptoms or bleeding.  We discussed fact that rectal symptoms may be more common with radiation than with other treatment modalities. With radiation therapy, we also discussed the difficulties in diagnosing recurrent disease at an early stage due to variability in PSA levels and the fact that most patients are not candidates for local salvage therapy when biochemical recurrence is declared.  We also discussed the significant morbidity of post-radiation local salvage therapy in terms of perioperative complications, erectile dysfunction and urinary incontinence. With surgery, we also discussed the potential advantage over radiation therapy in that biochemical recurrence can be detected at a relatively earlier stage and that salvage radiotherapy is successful in controlling recurrent disease in a substantial proportion of patients.  We also discussed that salvage radiotherapy was associated with a considerably more favorable morbidity profile compared to local salvage therapies for recurrent disease post-radiation.      We discussed option for further discussion  with radiation oncology. He defers this      The risks, benefits, alternatives, and personnel involved with robotic prostatectomy were discussed in detail. Specifically, we discussed that risks include but are not limited to anesthetic complications including stroke, MI, DVT/PE, as well as risk of bleeding requiring transfusion, bowel injury, infection, lymphocele, ureteral injury, nerve injury, urine leak and other potentially unforseen complications. Also discussed the risk of bladder neck contracture and other urinary symptoms after procedure. In addition, we discussed risk long-term of urinary incontinence and erectile dysfunction following the procedure. Finally, we discussed risk for adverse pathologic features, including positive surgical margins and potential for post-surgical radiation, hormone ablation and long-term need for PSA monitoring.  All questions were answered in detail.     Re: erectile dysfunction, we would plan for bilateral nerve sparing given the imaging findings of anterior tumor    Patient wishes to proceed with robotic assisted laparoscopic robotic prostatectomy with bilateral pelvic lymph node dissection    He has incidentally found right inguinal hernia on MRI. Will refer to general surgery for consideration of combination robotic inguinal hernia repair under same anesthesia. He has seen Dr. Michael for left inguinal hernia which was repaired in open fashion 2015. Will refer to Dr. Gibbs given Dr. Michael's imminent long term      Luke López MD   [facility]  [facility] Phone: [phone]    Video-Visit Details    Type of service:  Video Visit    Video End Time:5:13 PM    Originating Location (pt. Location): Home    Distant Location (provider location):  [facility]     Platform used for Video Visit: [employer]     40 minutes spent on this encounter including review of pathology, images, discussion with patient, documentation,  coordinating care     home

## 2022-07-19 DIAGNOSIS — R33.8 BENIGN PROSTATIC HYPERPLASIA WITH URINARY RETENTION: ICD-10-CM

## 2022-07-19 DIAGNOSIS — N40.1 BENIGN PROSTATIC HYPERPLASIA WITH URINARY RETENTION: ICD-10-CM

## 2022-07-19 RX ORDER — ALFUZOSIN HYDROCHLORIDE 10 MG/1
10 TABLET, EXTENDED RELEASE ORAL DAILY
Qty: 90 TABLET | Refills: 1 | Status: ON HOLD | OUTPATIENT
Start: 2022-07-19 | End: 2022-08-12

## 2022-07-29 ENCOUNTER — OFFICE VISIT (OUTPATIENT)
Dept: INTERNAL MEDICINE | Facility: CLINIC | Age: 67
End: 2022-07-29
Payer: COMMERCIAL

## 2022-07-29 VITALS
HEART RATE: 69 BPM | TEMPERATURE: 96.9 F | HEIGHT: 73 IN | SYSTOLIC BLOOD PRESSURE: 128 MMHG | RESPIRATION RATE: 16 BRPM | DIASTOLIC BLOOD PRESSURE: 78 MMHG | BODY MASS INDEX: 28.23 KG/M2 | OXYGEN SATURATION: 95 % | WEIGHT: 213 LBS

## 2022-07-29 DIAGNOSIS — I10 ESSENTIAL HYPERTENSION WITH GOAL BLOOD PRESSURE LESS THAN 140/90: ICD-10-CM

## 2022-07-29 DIAGNOSIS — E78.5 HYPERLIPIDEMIA LDL GOAL <130: ICD-10-CM

## 2022-07-29 DIAGNOSIS — M51.369 DDD (DEGENERATIVE DISC DISEASE), LUMBAR: ICD-10-CM

## 2022-07-29 DIAGNOSIS — Z01.818 PREOPERATIVE EXAMINATION: Primary | ICD-10-CM

## 2022-07-29 PROCEDURE — 99214 OFFICE O/P EST MOD 30 MIN: CPT | Performed by: INTERNAL MEDICINE

## 2022-07-29 NOTE — PROGRESS NOTES
[place] [address] [facility] [address]  Phone: [phone]  Primary Provider: Alexandria Vanegas  Pre-op Performing Provider: ALEXANDRIA VANEGAS      56}  PREOPERATIVE EVALUATION:  Today's date: 7/29/2022    Garrett Childers is a 66 year old male who presents for a preoperative evaluation.    Surgical Information:  Surgery/Procedure: Prostatectomy and right, herniorrhaphy, inguinal  Surgery Location: [facility]  Surgeon: Luke López and Rizwan Gibbs  Surgery Date: 8/12/2022  Time of Surgery: 1145  Where patient plans to recover: At home with family  Fax number for surgical facility: Note does not need to be faxed, will be available electronically in Epic.    Type of Anesthesia Anticipated: to be determined    Assessment & Plan   (Z01.818) Preoperative examination  (primary encounter diagnosis)  Comment:  Satisfactory operative candidate with anesthesia as required.     (M51.36) DDD (degenerative disc disease), lumbar  Comment: Reason for surgery.    (I10) Essential hypertension with goal blood pressure less than 140/90  Comment: Stable. Continue current meds.     (E78.5) Hyperlipidemia LDL goal <130  Comment: Continue current meds.     The proposed surgical procedure is considered INTERMEDIATE risk.    Patient Instructions   Everything looks fine to go ahead with surgery as planned.   Take your triamterine-hydrochlorothiazide medication with sips of water on the morning of surgery.           RECOMMENDATION:  APPROVAL GIVEN to proceed with proposed procedure, without further diagnostic evaluation.        956}    Subjective     HPI related to upcoming procedure: No recent acute illness symptoms.     Preop Questions 7/22/2022   1. Have you ever had a heart attack or stroke? No   2. Have you ever had surgery on your heart or blood vessels, such as a stent placement, a coronary artery bypass, or surgery on an artery in your head, neck, heart, or legs?  No   3. Do you have chest pain with activity? No   4. Do you have a history of  heart failure? No   5. Do you currently have a cold, bronchitis or symptoms of other infection? No   6. Do you have a cough, shortness of breath, or wheezing? No   7. Do you or anyone in your family have previous history of blood clots? No   8. Do you or does anyone in your family have a serious bleeding problem such as prolonged bleeding following surgeries or cuts? No   9. Have you ever had problems with anemia or been told to take iron pills? No   10. Have you had any abnormal blood loss such as black, tarry or bloody stools? No   11. Have you ever had a blood transfusion? No   12. Are you willing to have a blood transfusion if it is medically needed before, during, or after your surgery? Yes   13. Have you or any of your relatives ever had problems with anesthesia? No   14. Do you have sleep apnea, excessive snoring or daytime drowsiness? No   15. Do you have any artifical heart valves or other implanted medical devices like a pacemaker, defibrillator, or continuous glucose monitor? No   16. Do you have artificial joints? YES - partial right shoulder repair   17. Are you allergic to latex? No       Health Care Directive:  Patient does not have a Health Care Directive or Living Will: Patient states has Advance Directive and will bring in a copy to clinic.    Preoperative Review of :   reviewed - controlled substances reflected in medication list.    Past medical, family and social histories as well as medications reviewed and updated as needed.    Review of Systems  REVIEW OF SYSTEMS: The following systems have been completely reviewed and are negative except as noted above:   Constitutional, HEENT, respiratory, cardiovascular, gastrointestinal, genitourinary, musculoskeletal, hematologic, and neurologic systems.      Patient Active Problem List    Diagnosis Date Noted     Lumbar facet arthropathy 06/25/2014     Priority: Medium      DDD (degenerative disc disease), lumbar 06/25/2014     Priority: Medium     Central spinal stenosis 06/25/2014     Priority: Medium     Lumbar disc herniation 06/25/2014     Priority: Medium     Advanced directives, counseling/discussion 05/19/2014     Priority: Medium     PAtient will bring copy in for his medical records       HYPERLIPIDEMIA LDL GOAL <130 10/31/2010     Priority: Medium     Essential hypertension with goal blood pressure less than 140/90      Priority: Medium     Problem list name updated by automated process. Provider to review        Past Medical History:   Diagnosis Date     DDD (degenerative disc disease), lumbar      Hernia, abdominal      Unspecified essential hypertension      Past Surgical History:   Procedure Laterality Date     COLONOSCOPY  11/29/2007    Normal, repeat in 2017     COLONOSCOPY N/A 12/04/2017    Int Hemorrhoids, o/w normal. Procedure: COLONOSCOPY;  Colonoscopy;  Surgeon: Angeles Vyas MD;  Location:  GI     HERNIORRHAPHY INGUINAL Left 04/13/2015    Procedure: HERNIORRHAPHY INGUINAL;  Surgeon: Tyler Michael MD;  Location:  OR     ORTHOPEDIC SURGERY      corn shaved from left foot     TOTAL SHOULDER REPLACEMENT Right 10/2021    [facility] at [facility] NONSPECIFIC PROCEDURE  ~1977    Left knee surg for osteochondroma     [facility] NONSPECIFIC PROCEDURE  10/07/2015    Sebaceious cyst removal, left upper arm     Current Outpatient Medications   Medication Sig Dispense Refill     alfuzosin ER (UROXATRAL) 10 MG 24 hr tablet Take 1 tablet (10 mg) by mouth daily 90 tablet 1     simvastatin (ZOCOR) 20 MG tablet TAKE 1 TABLET BY MOUTH AT  BEDTIME 90 tablet 2     tadalafil (CIALIS) 5 MG tablet Take 1 tablet (5 mg) by mouth daily 90 tablet 3     triamterene-HCTZ (MAXZIDE-25) 37.5-25 MG tablet Take 1 tablet by mouth daily 90 tablet 3     ASPIRIN LOW DOSE 81 MG EC tablet Take 81 mg by mouth daily (Patient not taking: Reported on 7/29/2022)         Allergies   Allergen  "Reactions     Lisinopril      Cough.        Social History     Tobacco Use     Smoking status: Passive Smoke Exposure - Never Smoker     Smokeless tobacco: Former User     Quit date: 2005     Tobacco comment: cigar, rarely   Substance Use Topics     Alcohol use: Yes     Comment: <6 drinks/week. May have 2 beers or one mixed drinks on a given day     Family History   Problem Relation Age of Onset     Cancer Daughter 15        PTLD B-cell large-cell Non-Hodgkin's,  at 16     Family History Negative Mother         Born 1931. Lives in independent living.      Eye Disorder Father         Macular degeneration.      Prostate Cancer Father      Hypertension Father      Neurologic Disorder Father          age 84. Alzheimers, also depression     Depression Father      Arthritis Sister         Born      Heart Disease Daughter 0        twin born  heart transplant 3 months old CVA 1 /3 yo     Cerebrovascular Disease Daughter 1     Family History Negative Son              Family History Negative Daughter         twin born      Heart Disease Maternal Grandmother          66yo MI     Neurologic Disorder Maternal Grandfather          62yo Parkinson's     Heart Disease Paternal Grandmother          69yo      Family History Negative Paternal Grandfather          91yo      Colon Cancer No family hx of      History   Drug Use No         Objective     /78   Pulse 69   Temp 96.9  F (36.1  C) (Tympanic)   Resp 16   Ht 1.854 m (6' 1\")   Wt 96.6 kg (213 lb)   SpO2 95%   BMI 28.10 kg/m      Physical Exam  GENERAL APPEARANCE: healthy, alert and no distress  HENT: ear canals and TM's normal and nose and mouth without ulcers or lesions  RESP: lungs clear to auscultation - no rales, rhonchi or wheezes  CV: regular rate and rhythm, normal S1 S2, no S3 or S4 and no murmur, click or rub   ABDOMEN: soft, nontender, no HSM or masses and bowel sounds normal  NEURO: Normal " strength and tone, sensory exam grossly normal, mentation intact and speech normal    Recent Labs   Lab Test 06/01/22  0816 03/25/22  1020 10/01/21  0929   HGB  --  15.9 15.1   PLT  --  171 184    137 138   POTASSIUM 4.8 4.3 4.5   CR 0.82 0.90 0.89        Diagnostics:  No labs were ordered during this visit.   No EKG required, no history of coronary heart disease, significant arrhythmia, peripheral arterial disease or other structural heart disease.    Revised Cardiac Risk Index (RCRI):  The patient has the following serious cardiovascular risks for perioperative complications:   - No serious cardiac risks = 0 points     RCRI Interpretation: 0 points: Class I (very low risk - 0.4% complication rate)           Signed Electronically by: Bong Vanegas MD,   Copy of this evaluation report is provided to requesting physician.

## 2022-07-29 NOTE — PATIENT INSTRUCTIONS
Everything looks fine to go ahead with surgery as planned.   Take your triamterine-hydrochlorothiazide medication with sips of water on the morning of surgery.

## 2022-08-08 ENCOUNTER — LAB (OUTPATIENT)
Dept: LAB | Facility: CLINIC | Age: 67
End: 2022-08-08
Attending: SURGERY
Payer: COMMERCIAL

## 2022-08-08 DIAGNOSIS — K40.90 RIGHT INGUINAL HERNIA: ICD-10-CM

## 2022-08-08 PROCEDURE — U0003 INFECTIOUS AGENT DETECTION BY NUCLEIC ACID (DNA OR RNA); SEVERE ACUTE RESPIRATORY SYNDROME CORONAVIRUS 2 (SARS-COV-2) (CORONAVIRUS DISEASE [COVID-19]), AMPLIFIED PROBE TECHNIQUE, MAKING USE OF HIGH THROUGHPUT TECHNOLOGIES AS DESCRIBED BY CMS-2020-01-R: HCPCS

## 2022-08-08 PROCEDURE — U0005 INFEC AGEN DETEC AMPLI PROBE: HCPCS

## 2022-08-09 LAB — SARS-COV-2 RNA RESP QL NAA+PROBE: NEGATIVE

## 2022-08-11 NOTE — PHARMACY-ADMISSION MEDICATION HISTORY
Admission medication history interview status for this patient is complete. See [facility] admission navigator for allergy information, prior to admission medications and immunization status.       PTA meds completed by pre-admitting nurse Sol Ritchie and reviewed by pharmacy       Prior to Admission medications    Medication Sig Last Dose Taking? Auth Provider Long Term End Date   alfuzosin ER (UROXATRAL) 10 MG 24 hr tablet Take 1 tablet (10 mg) by mouth daily  Yes Luke López MD     simvastatin (ZOCOR) 20 MG tablet TAKE 1 TABLET BY MOUTH AT  BEDTIME  Yes Bong Vanegas MD Yes    tadalafil (CIALIS) 5 MG tablet Take 1 tablet (5 mg) by mouth daily  Yes Bong Vanegas MD Yes    triamterene-HCTZ (MAXZIDE-25) 37.5-25 MG tablet Take 1 tablet by mouth daily  Yes Bong Vanegas MD Yes

## 2022-08-12 ENCOUNTER — ANESTHESIA EVENT (OUTPATIENT)
Dept: SURGERY | Facility: CLINIC | Age: 67
End: 2022-08-12
Payer: COMMERCIAL

## 2022-08-12 ENCOUNTER — ANESTHESIA (OUTPATIENT)
Dept: SURGERY | Facility: CLINIC | Age: 67
End: 2022-08-12
Payer: COMMERCIAL

## 2022-08-12 ENCOUNTER — HOSPITAL ENCOUNTER (OUTPATIENT)
Facility: CLINIC | Age: 67
Discharge: HOME | End: 2022-08-13
Attending: STUDENT IN AN ORGANIZED HEALTH CARE EDUCATION/TRAINING PROGRAM | Admitting: STUDENT IN AN ORGANIZED HEALTH CARE EDUCATION/TRAINING PROGRAM
Payer: COMMERCIAL

## 2022-08-12 DIAGNOSIS — C61 PROSTATE CANCER (H): Primary | ICD-10-CM

## 2022-08-12 LAB
ABO/RH(D): NORMAL
ANION GAP SERPL CALCULATED.3IONS-SCNC: 9 MMOL/L (ref 7–15)
ANION GAP SERPL CALCULATED.3IONS-SCNC: 9 MMOL/L (ref 7–15)
ANTIBODY SCREEN: NEGATIVE
BUN SERPL-MCNC: 16.2 MG/DL (ref 8–23)
BUN SERPL-MCNC: 16.6 MG/DL (ref 8–23)
CALCIUM SERPL-MCNC: 9.1 MG/DL (ref 8.8–10.2)
CALCIUM SERPL-MCNC: 9.2 MG/DL (ref 8.8–10.2)
CHLORIDE SERPL-SCNC: 101 MMOL/L (ref 98–107)
CHLORIDE SERPL-SCNC: 103 MMOL/L (ref 98–107)
CREAT SERPL-MCNC: 0.86 MG/DL (ref 0.67–1.17)
CREAT SERPL-MCNC: 1.07 MG/DL (ref 0.67–1.17)
DEPRECATED HCO3 PLAS-SCNC: 27 MMOL/L (ref 22–29)
DEPRECATED HCO3 PLAS-SCNC: 29 MMOL/L (ref 22–29)
ERYTHROCYTE [DISTWIDTH] IN BLOOD BY AUTOMATED COUNT: 12.8 % (ref 10–15)
GFR SERPL CREATININE-BSD FRML MDRD: 77 ML/MIN/1.73M2
GFR SERPL CREATININE-BSD FRML MDRD: >90 ML/MIN/1.73M2
GLUCOSE SERPL-MCNC: 141 MG/DL (ref 70–99)
GLUCOSE SERPL-MCNC: 96 MG/DL (ref 70–99)
HCT VFR BLD AUTO: 46.5 % (ref 40–53)
HGB BLD-MCNC: 15.1 G/DL (ref 13.3–17.7)
HGB BLD-MCNC: 15.3 G/DL (ref 13.3–17.7)
MCH RBC QN AUTO: 31.5 PG (ref 26.5–33)
MCHC RBC AUTO-ENTMCNC: 32.9 G/DL (ref 31.5–36.5)
MCV RBC AUTO: 96 FL (ref 78–100)
PLATELET # BLD AUTO: 164 10E3/UL (ref 150–450)
POTASSIUM SERPL-SCNC: 3.7 MMOL/L (ref 3.4–5.3)
POTASSIUM SERPL-SCNC: 4.9 MMOL/L (ref 3.4–5.3)
RBC # BLD AUTO: 4.86 10E6/UL (ref 4.4–5.9)
SODIUM SERPL-SCNC: 139 MMOL/L (ref 136–145)
SODIUM SERPL-SCNC: 139 MMOL/L (ref 136–145)
SPECIMEN EXPIRATION DATE: NORMAL
WBC # BLD AUTO: 4.4 10E3/UL (ref 4–11)

## 2022-08-12 PROCEDURE — 86850 RBC ANTIBODY SCREEN: CPT | Performed by: STUDENT IN AN ORGANIZED HEALTH CARE EDUCATION/TRAINING PROGRAM

## 2022-08-12 PROCEDURE — C1781 MESH (IMPLANTABLE): HCPCS | Performed by: STUDENT IN AN ORGANIZED HEALTH CARE EDUCATION/TRAINING PROGRAM

## 2022-08-12 PROCEDURE — 370N000017 HC ANESTHESIA TECHNICAL FEE, PER MIN: Performed by: STUDENT IN AN ORGANIZED HEALTH CARE EDUCATION/TRAINING PROGRAM

## 2022-08-12 PROCEDURE — 85027 COMPLETE CBC AUTOMATED: CPT | Performed by: STUDENT IN AN ORGANIZED HEALTH CARE EDUCATION/TRAINING PROGRAM

## 2022-08-12 PROCEDURE — 999N000141 HC STATISTIC PRE-PROCEDURE NURSING ASSESSMENT: Performed by: STUDENT IN AN ORGANIZED HEALTH CARE EDUCATION/TRAINING PROGRAM

## 2022-08-12 PROCEDURE — 360N000080 HC SURGERY LEVEL 7, PER MIN: Performed by: STUDENT IN AN ORGANIZED HEALTH CARE EDUCATION/TRAINING PROGRAM

## 2022-08-12 PROCEDURE — 250N000011 HC RX IP 250 OP 636: Performed by: NURSE ANESTHETIST, CERTIFIED REGISTERED

## 2022-08-12 PROCEDURE — 88307 TISSUE EXAM BY PATHOLOGIST: CPT | Mod: 26 | Performed by: PATHOLOGY

## 2022-08-12 PROCEDURE — 88305 TISSUE EXAM BY PATHOLOGIST: CPT | Mod: 26 | Performed by: PATHOLOGY

## 2022-08-12 PROCEDURE — 258N000003 HC RX IP 258 OP 636: Performed by: STUDENT IN AN ORGANIZED HEALTH CARE EDUCATION/TRAINING PROGRAM

## 2022-08-12 PROCEDURE — 250N000011 HC RX IP 250 OP 636: Performed by: STUDENT IN AN ORGANIZED HEALTH CARE EDUCATION/TRAINING PROGRAM

## 2022-08-12 PROCEDURE — 49650 LAP ING HERNIA REPAIR INIT: CPT | Mod: AS | Performed by: PHYSICIAN ASSISTANT

## 2022-08-12 PROCEDURE — 88309 TISSUE EXAM BY PATHOLOGIST: CPT | Mod: 26 | Performed by: PATHOLOGY

## 2022-08-12 PROCEDURE — 710N000009 HC RECOVERY PHASE 1, LEVEL 1, PER MIN: Performed by: STUDENT IN AN ORGANIZED HEALTH CARE EDUCATION/TRAINING PROGRAM

## 2022-08-12 PROCEDURE — 36415 COLL VENOUS BLD VENIPUNCTURE: CPT | Performed by: STUDENT IN AN ORGANIZED HEALTH CARE EDUCATION/TRAINING PROGRAM

## 2022-08-12 PROCEDURE — 86901 BLOOD TYPING SEROLOGIC RH(D): CPT | Performed by: STUDENT IN AN ORGANIZED HEALTH CARE EDUCATION/TRAINING PROGRAM

## 2022-08-12 PROCEDURE — S2900 ROBOTIC SURGICAL SYSTEM: HCPCS | Performed by: SURGERY

## 2022-08-12 PROCEDURE — 49650 LAP ING HERNIA REPAIR INIT: CPT | Mod: RT | Performed by: SURGERY

## 2022-08-12 PROCEDURE — 88331 PATH CONSLTJ SURG 1 BLK 1SPC: CPT | Mod: TC | Performed by: STUDENT IN AN ORGANIZED HEALTH CARE EDUCATION/TRAINING PROGRAM

## 2022-08-12 PROCEDURE — 55866 LAPS SURG PRST8ECT RPBIC RAD: CPT | Performed by: STUDENT IN AN ORGANIZED HEALTH CARE EDUCATION/TRAINING PROGRAM

## 2022-08-12 PROCEDURE — 80048 BASIC METABOLIC PNL TOTAL CA: CPT | Performed by: STUDENT IN AN ORGANIZED HEALTH CARE EDUCATION/TRAINING PROGRAM

## 2022-08-12 PROCEDURE — 250N000009 HC RX 250: Performed by: NURSE ANESTHETIST, CERTIFIED REGISTERED

## 2022-08-12 PROCEDURE — 258N000003 HC RX IP 258 OP 636: Performed by: ANESTHESIOLOGY

## 2022-08-12 PROCEDURE — 258N000001 HC RX 258: Performed by: STUDENT IN AN ORGANIZED HEALTH CARE EDUCATION/TRAINING PROGRAM

## 2022-08-12 PROCEDURE — 38571 LAPAROSCOPY LYMPHADENECTOMY: CPT | Mod: 51 | Performed by: STUDENT IN AN ORGANIZED HEALTH CARE EDUCATION/TRAINING PROGRAM

## 2022-08-12 PROCEDURE — 88331 PATH CONSLTJ SURG 1 BLK 1SPC: CPT | Mod: 26 | Performed by: PATHOLOGY

## 2022-08-12 PROCEDURE — 85018 HEMOGLOBIN: CPT | Performed by: STUDENT IN AN ORGANIZED HEALTH CARE EDUCATION/TRAINING PROGRAM

## 2022-08-12 PROCEDURE — 272N000001 HC OR GENERAL SUPPLY STERILE: Performed by: STUDENT IN AN ORGANIZED HEALTH CARE EDUCATION/TRAINING PROGRAM

## 2022-08-12 PROCEDURE — 250N000013 HC RX MED GY IP 250 OP 250 PS 637: Performed by: STUDENT IN AN ORGANIZED HEALTH CARE EDUCATION/TRAINING PROGRAM

## 2022-08-12 PROCEDURE — 88302 TISSUE EXAM BY PATHOLOGIST: CPT | Mod: 26 | Performed by: PATHOLOGY

## 2022-08-12 DEVICE — MESH PROGRIP LAPAROSCOPIC 5.9X3.9" PARIETEX SELF-FIX LPG1510: Type: IMPLANTABLE DEVICE | Site: INGUINAL | Status: FUNCTIONAL

## 2022-08-12 RX ORDER — NEOMYCIN/BACITRACIN/POLYMYXINB 3.5-400-5K
OINTMENT (GRAM) TOPICAL 4 TIMES DAILY PRN
Status: DISCONTINUED | OUTPATIENT
Start: 2022-08-12 | End: 2022-08-13 | Stop reason: HOSPADM

## 2022-08-12 RX ORDER — LIDOCAINE HYDROCHLORIDE 10 MG/ML
INJECTION, SOLUTION INFILTRATION; PERINEURAL PRN
Status: DISCONTINUED | OUTPATIENT
Start: 2022-08-12 | End: 2022-08-12

## 2022-08-12 RX ORDER — POLYETHYLENE GLYCOL 3350 17 G/17G
17 POWDER, FOR SOLUTION ORAL DAILY
Status: DISCONTINUED | OUTPATIENT
Start: 2022-08-12 | End: 2022-08-13 | Stop reason: HOSPADM

## 2022-08-12 RX ORDER — HYDRALAZINE HYDROCHLORIDE 20 MG/ML
10 INJECTION INTRAMUSCULAR; INTRAVENOUS EVERY 6 HOURS PRN
Status: DISCONTINUED | OUTPATIENT
Start: 2022-08-12 | End: 2022-08-13 | Stop reason: HOSPADM

## 2022-08-12 RX ORDER — ONDANSETRON 4 MG/1
4 TABLET, ORALLY DISINTEGRATING ORAL EVERY 30 MIN PRN
Status: CANCELLED | OUTPATIENT
Start: 2022-08-12

## 2022-08-12 RX ORDER — LIDOCAINE 50 MG/G
OINTMENT TOPICAL 4 TIMES DAILY PRN
Status: DISCONTINUED | OUTPATIENT
Start: 2022-08-12 | End: 2022-08-13 | Stop reason: HOSPADM

## 2022-08-12 RX ORDER — NALOXONE HYDROCHLORIDE 0.4 MG/ML
0.2 INJECTION, SOLUTION INTRAMUSCULAR; INTRAVENOUS; SUBCUTANEOUS
Status: DISCONTINUED | OUTPATIENT
Start: 2022-08-12 | End: 2022-08-13 | Stop reason: HOSPADM

## 2022-08-12 RX ORDER — ONDANSETRON 2 MG/ML
4 INJECTION INTRAMUSCULAR; INTRAVENOUS EVERY 30 MIN PRN
Status: CANCELLED | OUTPATIENT
Start: 2022-08-12

## 2022-08-12 RX ORDER — FENTANYL CITRATE 50 UG/ML
25 INJECTION, SOLUTION INTRAMUSCULAR; INTRAVENOUS EVERY 5 MIN PRN
Status: DISCONTINUED | OUTPATIENT
Start: 2022-08-12 | End: 2022-08-12 | Stop reason: HOSPADM

## 2022-08-12 RX ORDER — HYDROMORPHONE HCL IN WATER/PF 6 MG/30 ML
0.2 PATIENT CONTROLLED ANALGESIA SYRINGE INTRAVENOUS EVERY 5 MIN PRN
Status: DISCONTINUED | OUTPATIENT
Start: 2022-08-12 | End: 2022-08-12 | Stop reason: HOSPADM

## 2022-08-12 RX ORDER — DIMENHYDRINATE 50 MG/ML
25 INJECTION, SOLUTION INTRAMUSCULAR; INTRAVENOUS
Status: CANCELLED | OUTPATIENT
Start: 2022-08-12

## 2022-08-12 RX ORDER — NALOXONE HYDROCHLORIDE 0.4 MG/ML
0.4 INJECTION, SOLUTION INTRAMUSCULAR; INTRAVENOUS; SUBCUTANEOUS
Status: DISCONTINUED | OUTPATIENT
Start: 2022-08-12 | End: 2022-08-13 | Stop reason: HOSPADM

## 2022-08-12 RX ORDER — OXYCODONE HYDROCHLORIDE 5 MG/1
5 TABLET ORAL EVERY 4 HOURS PRN
Status: CANCELLED | OUTPATIENT
Start: 2022-08-12

## 2022-08-12 RX ORDER — FENTANYL CITRATE 50 UG/ML
INJECTION, SOLUTION INTRAMUSCULAR; INTRAVENOUS PRN
Status: DISCONTINUED | OUTPATIENT
Start: 2022-08-12 | End: 2022-08-12

## 2022-08-12 RX ORDER — OXYCODONE HYDROCHLORIDE 5 MG/1
10 TABLET ORAL EVERY 4 HOURS PRN
Status: DISCONTINUED | OUTPATIENT
Start: 2022-08-12 | End: 2022-08-13 | Stop reason: HOSPADM

## 2022-08-12 RX ORDER — PHENOL 1.4 %
1 AEROSOL, SPRAY (ML) MUCOUS MEMBRANE DAILY
COMMUNITY

## 2022-08-12 RX ORDER — NEOSTIGMINE METHYLSULFATE 1 MG/ML
VIAL (ML) INJECTION PRN
Status: DISCONTINUED | OUTPATIENT
Start: 2022-08-12 | End: 2022-08-12

## 2022-08-12 RX ORDER — LIDOCAINE 40 MG/G
CREAM TOPICAL
Status: DISCONTINUED | OUTPATIENT
Start: 2022-08-12 | End: 2022-08-12 | Stop reason: HOSPADM

## 2022-08-12 RX ORDER — PROPOFOL 10 MG/ML
INJECTION, EMULSION INTRAVENOUS PRN
Status: DISCONTINUED | OUTPATIENT
Start: 2022-08-12 | End: 2022-08-12

## 2022-08-12 RX ORDER — SODIUM CHLORIDE, SODIUM LACTATE, POTASSIUM CHLORIDE, CALCIUM CHLORIDE 600; 310; 30; 20 MG/100ML; MG/100ML; MG/100ML; MG/100ML
INJECTION, SOLUTION INTRAVENOUS CONTINUOUS
Status: DISCONTINUED | OUTPATIENT
Start: 2022-08-12 | End: 2022-08-12 | Stop reason: HOSPADM

## 2022-08-12 RX ORDER — GLYCOPYRROLATE 0.2 MG/ML
INJECTION, SOLUTION INTRAMUSCULAR; INTRAVENOUS PRN
Status: DISCONTINUED | OUTPATIENT
Start: 2022-08-12 | End: 2022-08-12

## 2022-08-12 RX ORDER — DIPHENHYDRAMINE HYDROCHLORIDE 50 MG/ML
12.5 INJECTION INTRAMUSCULAR; INTRAVENOUS EVERY 6 HOURS PRN
Status: CANCELLED | OUTPATIENT
Start: 2022-08-12

## 2022-08-12 RX ORDER — ACETAMINOPHEN 325 MG/1
650 TABLET ORAL EVERY 4 HOURS PRN
Qty: 100 TABLET | Refills: 0 | Status: SHIPPED | OUTPATIENT
Start: 2022-08-12 | End: 2022-08-13

## 2022-08-12 RX ORDER — DIPHENHYDRAMINE HCL 12.5MG/5ML
12.5 LIQUID (ML) ORAL EVERY 6 HOURS PRN
Status: CANCELLED | OUTPATIENT
Start: 2022-08-12

## 2022-08-12 RX ORDER — HYDRALAZINE HYDROCHLORIDE 20 MG/ML
2.5-5 INJECTION INTRAMUSCULAR; INTRAVENOUS EVERY 10 MIN PRN
Status: DISCONTINUED | OUTPATIENT
Start: 2022-08-12 | End: 2022-08-12 | Stop reason: HOSPADM

## 2022-08-12 RX ORDER — OXYBUTYNIN CHLORIDE 5 MG/1
5 TABLET, EXTENDED RELEASE ORAL DAILY PRN
Qty: 14 TABLET | Refills: 0 | Status: SHIPPED | OUTPATIENT
Start: 2022-08-12 | End: 2022-08-13

## 2022-08-12 RX ORDER — KETOROLAC TROMETHAMINE 30 MG/ML
INJECTION, SOLUTION INTRAMUSCULAR; INTRAVENOUS PRN
Status: DISCONTINUED | OUTPATIENT
Start: 2022-08-12 | End: 2022-08-12

## 2022-08-12 RX ORDER — ONDANSETRON 4 MG/1
4 TABLET, ORALLY DISINTEGRATING ORAL EVERY 6 HOURS PRN
Status: DISCONTINUED | OUTPATIENT
Start: 2022-08-12 | End: 2022-08-13 | Stop reason: HOSPADM

## 2022-08-12 RX ORDER — SODIUM CHLORIDE, SODIUM LACTATE, POTASSIUM CHLORIDE, CALCIUM CHLORIDE 600; 310; 30; 20 MG/100ML; MG/100ML; MG/100ML; MG/100ML
INJECTION, SOLUTION INTRAVENOUS CONTINUOUS
Status: CANCELLED | OUTPATIENT
Start: 2022-08-12

## 2022-08-12 RX ORDER — LIDOCAINE 40 MG/G
CREAM TOPICAL
Status: CANCELLED | OUTPATIENT
Start: 2022-08-12

## 2022-08-12 RX ORDER — DEXAMETHASONE SODIUM PHOSPHATE 4 MG/ML
INJECTION, SOLUTION INTRA-ARTICULAR; INTRALESIONAL; INTRAMUSCULAR; INTRAVENOUS; SOFT TISSUE PRN
Status: DISCONTINUED | OUTPATIENT
Start: 2022-08-12 | End: 2022-08-12

## 2022-08-12 RX ORDER — HALOPERIDOL 5 MG/ML
1 INJECTION INTRAMUSCULAR
Status: CANCELLED | OUTPATIENT
Start: 2022-08-12

## 2022-08-12 RX ORDER — LIDOCAINE 40 MG/G
CREAM TOPICAL
Status: DISCONTINUED | OUTPATIENT
Start: 2022-08-12 | End: 2022-08-13 | Stop reason: HOSPADM

## 2022-08-12 RX ORDER — LABETALOL HYDROCHLORIDE 5 MG/ML
10 INJECTION, SOLUTION INTRAVENOUS
Status: CANCELLED | OUTPATIENT
Start: 2022-08-12

## 2022-08-12 RX ORDER — BUPIVACAINE HYDROCHLORIDE 2.5 MG/ML
INJECTION, SOLUTION INFILTRATION; PERINEURAL PRN
Status: DISCONTINUED | OUTPATIENT
Start: 2022-08-12 | End: 2022-08-12 | Stop reason: HOSPADM

## 2022-08-12 RX ORDER — SIMVASTATIN 20 MG
20 TABLET ORAL AT BEDTIME
Status: DISCONTINUED | OUTPATIENT
Start: 2022-08-12 | End: 2022-08-13 | Stop reason: HOSPADM

## 2022-08-12 RX ORDER — AMOXICILLIN 250 MG
1 CAPSULE ORAL 2 TIMES DAILY PRN
Qty: 30 TABLET | Refills: 1 | Status: SHIPPED | OUTPATIENT
Start: 2022-08-12 | End: 2022-08-13

## 2022-08-12 RX ORDER — FENTANYL CITRATE 50 UG/ML
25 INJECTION, SOLUTION INTRAMUSCULAR; INTRAVENOUS EVERY 5 MIN PRN
Status: CANCELLED | OUTPATIENT
Start: 2022-08-12

## 2022-08-12 RX ORDER — ONDANSETRON 4 MG/1
4 TABLET, ORALLY DISINTEGRATING ORAL EVERY 30 MIN PRN
Status: DISCONTINUED | OUTPATIENT
Start: 2022-08-12 | End: 2022-08-12 | Stop reason: HOSPADM

## 2022-08-12 RX ORDER — ATROPA BELLADONNA AND OPIUM 16.2; 3 MG/1; MG/1
30 SUPPOSITORY RECTAL 3 TIMES DAILY PRN
Status: DISCONTINUED | OUTPATIENT
Start: 2022-08-12 | End: 2022-08-13 | Stop reason: HOSPADM

## 2022-08-12 RX ORDER — PROCHLORPERAZINE MALEATE 5 MG
5 TABLET ORAL EVERY 6 HOURS PRN
Status: DISCONTINUED | OUTPATIENT
Start: 2022-08-12 | End: 2022-08-13 | Stop reason: HOSPADM

## 2022-08-12 RX ORDER — OXYCODONE HYDROCHLORIDE 5 MG/1
5 TABLET ORAL EVERY 4 HOURS PRN
Status: DISCONTINUED | OUTPATIENT
Start: 2022-08-12 | End: 2022-08-12 | Stop reason: HOSPADM

## 2022-08-12 RX ORDER — KETOROLAC TROMETHAMINE 15 MG/ML
15 INJECTION, SOLUTION INTRAMUSCULAR; INTRAVENOUS
Status: DISCONTINUED | OUTPATIENT
Start: 2022-08-12 | End: 2022-08-12 | Stop reason: HOSPADM

## 2022-08-12 RX ORDER — HEPARIN SODIUM 5000 [USP'U]/.5ML
5000 INJECTION, SOLUTION INTRAVENOUS; SUBCUTANEOUS EVERY 8 HOURS
Status: DISCONTINUED | OUTPATIENT
Start: 2022-08-13 | End: 2022-08-13 | Stop reason: HOSPADM

## 2022-08-12 RX ORDER — HYDROMORPHONE HCL IN WATER/PF 6 MG/30 ML
0.4 PATIENT CONTROLLED ANALGESIA SYRINGE INTRAVENOUS
Status: DISCONTINUED | OUTPATIENT
Start: 2022-08-12 | End: 2022-08-13 | Stop reason: HOSPADM

## 2022-08-12 RX ORDER — ALBUTEROL SULFATE 0.83 MG/ML
2.5 SOLUTION RESPIRATORY (INHALATION) EVERY 4 HOURS PRN
Status: CANCELLED | OUTPATIENT
Start: 2022-08-12

## 2022-08-12 RX ORDER — CIPROFLOXACIN 500 MG/1
500 TABLET, FILM COATED ORAL ONCE
Qty: 1 TABLET | Refills: 0 | Status: SHIPPED | OUTPATIENT
Start: 2022-08-12 | End: 2022-08-13

## 2022-08-12 RX ORDER — OXYCODONE HYDROCHLORIDE 5 MG/1
5 TABLET ORAL EVERY 4 HOURS PRN
Status: DISCONTINUED | OUTPATIENT
Start: 2022-08-12 | End: 2022-08-13 | Stop reason: HOSPADM

## 2022-08-12 RX ORDER — TRIAMTERENE/HYDROCHLOROTHIAZID 37.5-25 MG
1 TABLET ORAL DAILY
Status: DISCONTINUED | OUTPATIENT
Start: 2022-08-12 | End: 2022-08-13 | Stop reason: HOSPADM

## 2022-08-12 RX ORDER — LABETALOL 20 MG/4 ML (5 MG/ML) INTRAVENOUS SYRINGE
PRN
Status: DISCONTINUED | OUTPATIENT
Start: 2022-08-12 | End: 2022-08-12

## 2022-08-12 RX ORDER — ACETAMINOPHEN 325 MG/1
650 TABLET ORAL EVERY 6 HOURS
Status: DISCONTINUED | OUTPATIENT
Start: 2022-08-12 | End: 2022-08-13 | Stop reason: HOSPADM

## 2022-08-12 RX ORDER — HYDROMORPHONE HCL IN WATER/PF 6 MG/30 ML
0.2 PATIENT CONTROLLED ANALGESIA SYRINGE INTRAVENOUS
Status: DISCONTINUED | OUTPATIENT
Start: 2022-08-12 | End: 2022-08-13 | Stop reason: HOSPADM

## 2022-08-12 RX ORDER — DIAZEPAM 10 MG/2ML
2.5 INJECTION, SOLUTION INTRAMUSCULAR; INTRAVENOUS
Status: CANCELLED | OUTPATIENT
Start: 2022-08-12

## 2022-08-12 RX ORDER — CEFAZOLIN SODIUM/WATER 2 G/20 ML
2 SYRINGE (ML) INTRAVENOUS
Status: COMPLETED | OUTPATIENT
Start: 2022-08-12 | End: 2022-08-12

## 2022-08-12 RX ORDER — METOPROLOL TARTRATE 1 MG/ML
1-2 INJECTION, SOLUTION INTRAVENOUS EVERY 5 MIN PRN
Status: DISCONTINUED | OUTPATIENT
Start: 2022-08-12 | End: 2022-08-12 | Stop reason: HOSPADM

## 2022-08-12 RX ORDER — ONDANSETRON 2 MG/ML
4 INJECTION INTRAMUSCULAR; INTRAVENOUS EVERY 6 HOURS PRN
Status: DISCONTINUED | OUTPATIENT
Start: 2022-08-12 | End: 2022-08-13 | Stop reason: HOSPADM

## 2022-08-12 RX ORDER — AMOXICILLIN 250 MG
1 CAPSULE ORAL 2 TIMES DAILY
Status: DISCONTINUED | OUTPATIENT
Start: 2022-08-12 | End: 2022-08-13 | Stop reason: HOSPADM

## 2022-08-12 RX ORDER — ACETAMINOPHEN 325 MG/1
975 TABLET ORAL ONCE
Status: CANCELLED | OUTPATIENT
Start: 2022-08-12 | End: 2022-08-12

## 2022-08-12 RX ORDER — CEFAZOLIN SODIUM/WATER 2 G/20 ML
2 SYRINGE (ML) INTRAVENOUS SEE ADMIN INSTRUCTIONS
Status: DISCONTINUED | OUTPATIENT
Start: 2022-08-12 | End: 2022-08-12 | Stop reason: HOSPADM

## 2022-08-12 RX ORDER — ONDANSETRON 2 MG/ML
4 INJECTION INTRAMUSCULAR; INTRAVENOUS EVERY 30 MIN PRN
Status: DISCONTINUED | OUTPATIENT
Start: 2022-08-12 | End: 2022-08-12 | Stop reason: HOSPADM

## 2022-08-12 RX ORDER — ONDANSETRON 2 MG/ML
INJECTION INTRAMUSCULAR; INTRAVENOUS PRN
Status: DISCONTINUED | OUTPATIENT
Start: 2022-08-12 | End: 2022-08-12

## 2022-08-12 RX ORDER — HYDRALAZINE HYDROCHLORIDE 20 MG/ML
2.5-5 INJECTION INTRAMUSCULAR; INTRAVENOUS EVERY 10 MIN PRN
Status: CANCELLED | OUTPATIENT
Start: 2022-08-12

## 2022-08-12 RX ORDER — LABETALOL 20 MG/4 ML (5 MG/ML) INTRAVENOUS SYRINGE
20 EVERY 4 HOURS PRN
Status: DISCONTINUED | OUTPATIENT
Start: 2022-08-12 | End: 2022-08-13 | Stop reason: HOSPADM

## 2022-08-12 RX ORDER — SODIUM CHLORIDE 9 MG/ML
INJECTION, SOLUTION INTRAVENOUS CONTINUOUS
Status: DISCONTINUED | OUTPATIENT
Start: 2022-08-12 | End: 2022-08-13 | Stop reason: HOSPADM

## 2022-08-12 RX ADMIN — POLYETHYLENE GLYCOL 3350 17 G: 17 POWDER, FOR SOLUTION ORAL at 19:51

## 2022-08-12 RX ADMIN — Medication 2 G: at 15:53

## 2022-08-12 RX ADMIN — HYDROMORPHONE HYDROCHLORIDE 1 MG: 1 INJECTION, SOLUTION INTRAMUSCULAR; INTRAVENOUS; SUBCUTANEOUS at 12:02

## 2022-08-12 RX ADMIN — ROCURONIUM BROMIDE 20 MG: 50 INJECTION, SOLUTION INTRAVENOUS at 16:00

## 2022-08-12 RX ADMIN — ROCURONIUM BROMIDE 20 MG: 50 INJECTION, SOLUTION INTRAVENOUS at 12:35

## 2022-08-12 RX ADMIN — HYDROMORPHONE HYDROCHLORIDE 0.2 MG: 0.2 INJECTION, SOLUTION INTRAMUSCULAR; INTRAVENOUS; SUBCUTANEOUS at 20:51

## 2022-08-12 RX ADMIN — KETOROLAC TROMETHAMINE 30 MG: 30 INJECTION, SOLUTION INTRAMUSCULAR at 12:02

## 2022-08-12 RX ADMIN — GLYCOPYRROLATE 0.2 MG: 0.2 INJECTION, SOLUTION INTRAMUSCULAR; INTRAVENOUS at 11:56

## 2022-08-12 RX ADMIN — ONDANSETRON HYDROCHLORIDE 4 MG: 2 INJECTION, SOLUTION INTRAVENOUS at 12:02

## 2022-08-12 RX ADMIN — LABETALOL 20 MG/4 ML (5 MG/ML) INTRAVENOUS SYRINGE 10 MG: at 17:25

## 2022-08-12 RX ADMIN — PROPOFOL 100 MCG/KG/MIN: 10 INJECTION, EMULSION INTRAVENOUS at 11:59

## 2022-08-12 RX ADMIN — DEXAMETHASONE SODIUM PHOSPHATE 8 MG: 4 INJECTION, SOLUTION INTRA-ARTICULAR; INTRALESIONAL; INTRAMUSCULAR; INTRAVENOUS; SOFT TISSUE at 12:02

## 2022-08-12 RX ADMIN — SODIUM CHLORIDE: 9 INJECTION, SOLUTION INTRAVENOUS at 19:58

## 2022-08-12 RX ADMIN — LIDOCAINE HYDROCHLORIDE 50 MG: 10 INJECTION, SOLUTION INFILTRATION; PERINEURAL at 12:02

## 2022-08-12 RX ADMIN — SODIUM CHLORIDE, POTASSIUM CHLORIDE, SODIUM LACTATE AND CALCIUM CHLORIDE: 600; 310; 30; 20 INJECTION, SOLUTION INTRAVENOUS at 13:06

## 2022-08-12 RX ADMIN — ROCURONIUM BROMIDE 10 MG: 50 INJECTION, SOLUTION INTRAVENOUS at 17:23

## 2022-08-12 RX ADMIN — MIDAZOLAM 2 MG: 1 INJECTION INTRAMUSCULAR; INTRAVENOUS at 11:53

## 2022-08-12 RX ADMIN — SIMVASTATIN 20 MG: 20 TABLET, FILM COATED ORAL at 21:07

## 2022-08-12 RX ADMIN — FENTANYL CITRATE 100 MCG: 50 INJECTION, SOLUTION INTRAMUSCULAR; INTRAVENOUS at 12:02

## 2022-08-12 RX ADMIN — GLYCOPYRROLATE 0.4 MG: 0.2 INJECTION, SOLUTION INTRAMUSCULAR; INTRAVENOUS at 18:02

## 2022-08-12 RX ADMIN — NEOSTIGMINE METHYLSULFATE 3 MG: 1 INJECTION, SOLUTION INTRAVENOUS at 18:02

## 2022-08-12 RX ADMIN — ROCURONIUM BROMIDE 50 MG: 50 INJECTION, SOLUTION INTRAVENOUS at 12:02

## 2022-08-12 RX ADMIN — HYDROMORPHONE HYDROCHLORIDE 0.4 MG: 0.2 INJECTION, SOLUTION INTRAMUSCULAR; INTRAVENOUS; SUBCUTANEOUS at 23:26

## 2022-08-12 RX ADMIN — PROPOFOL 200 MG: 10 INJECTION, EMULSION INTRAVENOUS at 12:02

## 2022-08-12 RX ADMIN — SENNOSIDES AND DOCUSATE SODIUM 1 TABLET: 50; 8.6 TABLET ORAL at 21:07

## 2022-08-12 RX ADMIN — Medication 2 G: at 11:53

## 2022-08-12 RX ADMIN — SODIUM CHLORIDE, POTASSIUM CHLORIDE, SODIUM LACTATE AND CALCIUM CHLORIDE: 600; 310; 30; 20 INJECTION, SOLUTION INTRAVENOUS at 11:53

## 2022-08-12 RX ADMIN — FENTANYL CITRATE 50 MCG: 50 INJECTION, SOLUTION INTRAMUSCULAR; INTRAVENOUS at 16:18

## 2022-08-12 RX ADMIN — SODIUM CHLORIDE, POTASSIUM CHLORIDE, SODIUM LACTATE AND CALCIUM CHLORIDE: 600; 310; 30; 20 INJECTION, SOLUTION INTRAVENOUS at 17:23

## 2022-08-12 RX ADMIN — FENTANYL CITRATE 50 MCG: 50 INJECTION, SOLUTION INTRAMUSCULAR; INTRAVENOUS at 17:22

## 2022-08-12 RX ADMIN — ACETAMINOPHEN 650 MG: 325 TABLET ORAL at 19:52

## 2022-08-12 ASSESSMENT — ACTIVITIES OF DAILY LIVING (ADL)
ADLS_ACUITY_SCORE: 20

## 2022-08-12 NOTE — BRIEF OP NOTE
"[facility]    Brief Operative Note    Pre-operative diagnosis: Prostate cancer (H) [C61]  Right inguinal hernia [K40.90]  Post-operative diagnosis Same as pre-operative diagnosis    Procedure: Procedure(s):  1. Xi robotic assisted laparoscopic radical prostatectomy with bilateral pelvic lymph node dissection possible open  2. Xi Robotic Assisted HERNIORRHAPHY, INGUINAL, LAPAROSCOPIC, WITH MESH  Surgeon: Surgeon(s) and Role:  Panel 1:     * Luke López MD - Primary  Panel 2:     * Rizwan Gibbs MD - Primary     * Kingsley Stauffer PA-C - Assisting  Anesthesia: General   Estimated Blood Loss: Less than 10 ml for hernia portion.    Drains: None for hernia portion.  Specimens:   ID Type Source Tests Collected by Time Destination   1 : PERITONEAL NODULE FROM POSTERIOR SURFACE OF BLADDER Tissue Peritoneum SURGICAL PATHOLOGY EXAM Luke López MD 8/12/2022  1:13 PM      Findings:   Indirect defect containing pre-peritoneal fat.  Complications: None.  Implants:   Implant Name Type Inv. Item Serial No.  Lot No. LRB No. Used Action   MESH PROGRIP LAPAROSCOPIC 5.9X3.9\" PAR[employer]EX SELF-FIX [id number] - [id number] Mesh MESH PROGRIP LAPAROSCOPIC 5.9X3.9\" PARIETEX SELF-FIX [id number]  [facility] [id number] Right 1 Implanted           "

## 2022-08-12 NOTE — ANESTHESIA PREPROCEDURE EVALUATION
Anesthesia Pre-Procedure Evaluation    Patient: Garrett Childers   MRN: [id number] : 1955        Procedure : Procedure(s):  1. Xi robotic assisted laparoscopic radical prostatectomy with bilateral pelvic lymph node dissection possible open  2. Xi Robotic Assisted HERNIORRHAPHY, INGUINAL, LAPAROSCOPIC, WITH MESH          Past Medical History:   Diagnosis Date     DDD (degenerative disc disease), lumbar      Hernia, abdominal      Unspecified essential hypertension       Past Surgical History:   Procedure Laterality Date     COLONOSCOPY  2007    Normal, repeat in      COLONOSCOPY N/A 2017    Int Hemorrhoids, o/w normal. Procedure: COLONOSCOPY;  Colonoscopy;  Surgeon: Angeles Vyas MD;  Location:  GI     HERNIORRHAPHY INGUINAL Left 2015    Procedure: HERNIORRHAPHY INGUINAL;  Surgeon: Tyler Michael MD;  Location:  OR     ORTHOPEDIC SURGERY      corn shaved from left foot     TOTAL SHOULDER REPLACEMENT Right 10/2021    Alonzohiren Lam at [facility] NONSPECIFIC PROCEDURE  ~    Left knee surg for osteochondroma     [facility] NONSPECIFIC PROCEDURE  10/07/2015    Sebaceious cyst removal, left upper arm      Allergies   Allergen Reactions     Lisinopril      Cough.      Social History     Tobacco Use     Smoking status: Passive Smoke Exposure - Never Smoker     Smokeless tobacco: Former User     Quit date: 2005     Tobacco comment: cigar, rarely   Substance Use Topics     Alcohol use: Yes     Comment: <6 drinks/week. May have 2 beers or one mixed drinks on a given day      Wt Readings from Last 1 Encounters:   22 96.6 kg (213 lb)        Anesthesia Evaluation   Pt has had prior anesthetic. Type: General.    No history of anesthetic complications       ROS/MED HX  ENT/Pulmonary:  - neg pulmonary ROS     Neurologic:  - neg neurologic ROS     Cardiovascular:     (+) Dyslipidemia hypertension-----    METS/Exercise Tolerance:     Hematologic:  - neg hematologic  ROS      Musculoskeletal:   (+) arthritis,     GI/Hepatic:  - neg GI/hepatic ROS     Renal/Genitourinary:  - neg Renal ROS     Endo:  - neg endo ROS     Psychiatric/Substance Use:  - neg psychiatric ROS     Infectious Disease:  - neg infectious disease ROS     Malignancy:   (+) Malignancy, History of Prostate.Prostate CA Active status post.        Other:  - neg other ROS          Physical Exam    Airway        Mallampati: II   TM distance: > 3 FB   Neck ROM: full   Mouth opening: > 3 cm    Respiratory Devices and Support         Dental  no notable dental history         Cardiovascular   cardiovascular exam normal       Rhythm and rate: regular and normal     Pulmonary   pulmonary exam normal        breath sounds clear to auscultation       Other findings: Lab Test        03/25/22     10/01/21     03/12/21                       1020          0929          0930          WBC          4.9          4.8          4.2           HGB          15.9         15.1         15.6          MCV          95           93           94            PLT          171          184          156            Lab Test        06/01/22     03/25/22     10/01/21                       0816          1020          0929          NA           140          137          138           POTASSIUM    4.8          4.3          4.5           CHLORIDE     107          105          106           CO2          28           24           27            BUN          17           16           17            CR           0.82         0.90         0.89          ANIONGAP     5            8            5             JUAN          9.5          9.6          9.6           GLC          100*         95           95                   EKG Interpretation:   Sinus  Rhythm   -Left axis -anterior fascicular block.    -Old anteroseptal infarct.     ABNORMAL     OUTSIDE LABS:  CBC:   Lab Results   Component Value Date    WBC 4.9 03/25/2022    WBC 4.8 10/01/2021    HGB 15.9 03/25/2022    HGB 15.1  10/01/2021    HCT 47.6 03/25/2022    HCT 44.4 10/01/2021     03/25/2022     10/01/2021     BMP:   Lab Results   Component Value Date     06/01/2022     03/25/2022    POTASSIUM 4.8 06/01/2022    POTASSIUM 4.3 03/25/2022    CHLORIDE 107 06/01/2022    CHLORIDE 105 03/25/2022    CO2 28 06/01/2022    CO2 24 03/25/2022    BUN 17 06/01/2022    BUN 16 03/25/2022    CR 0.82 06/01/2022    CR 0.90 03/25/2022     (H) 06/01/2022    GLC 95 03/25/2022     COAGS: No results found for: PTT, INR, FIBR  POC:   Lab Results   Component Value Date     (H) 04/13/2015     HEPATIC:   Lab Results   Component Value Date    ALBUMIN 3.9 06/01/2022    PROTTOTAL 7.6 06/01/2022    ALT 39 06/01/2022    AST 28 06/01/2022    ALKPHOS 67 06/01/2022    BILITOTAL 0.6 06/01/2022     OTHER:   Lab Results   Component Value Date    JUAN 9.5 06/01/2022    TSH 3.40 03/25/2022       Anesthesia Plan    ASA Status:  2      Anesthesia Type: General.     - Airway: ETT   Induction: Intravenous.   Maintenance: Balanced.        Consents    Anesthesia Plan(s) and associated risks, benefits, and realistic alternatives discussed. Questions answered and patient/representative(s) expressed understanding.    - Discussed:     - Discussed with:  Patient      - Extended Intubation/Ventilatory Support Discussed: No.      - Patient is DNR/DNI Status: No    Use of blood products discussed: No .     Postoperative Care    Pain management: Oral pain medications, IV analgesics, Multi-modal analgesia.   PONV prophylaxis: Ondansetron (or other 5HT-3), Background Propofol Infusion, Dexamethasone or Solumedrol     Comments:                Jeanmarie Patel MD

## 2022-08-12 NOTE — DISCHARGE INSTRUCTIONS
HOME CARE FOLLOWING INGUINAL/FEMORAL HERNIA REPAIR  MARIA G Galvan, JUNIE Gomez, IVY Azevedo, DIAMANTE Lemon    DIET:  Start with liquids and gradually resume your regular diet as tolerated.  Drink plenty of fluids.  While taking pain medications, consider use of a stool softener, increase your fiber in your diet, or add a fiber supplement (like Metamucil, Citrucel) to help prevent constipation - a possible side effect of pain medications.    NAUSEA:  If nauseated from the anesthetic/pain meds; rest in bed, get up cautiously with assistance, and drink clear liquids (juice, tea, broth).    ACTIVITY:  Light Activity -- you may immediately be up and about as tolerated.  Walking is encouraged, increase as tolerated.  Driving/Light Work-- when comfortable and off narcotic pain medications.  Strenuous Work/Activity -- limit lifting to 20 pounds for 3 weeks.  Active Sports (running, biking, etc.) -- cautiously resume after 4 weeks.    INCISIONAL CARE:  If you have a dressing in place, keep clean and dry for 48 hours; you may replace the gauze if it becomes soiled.  After 48 hours you may remove the dressing and shower.  Do not submerse incision in water for 1 week.  If you have a Dermabond dressing (a type of skin glue), you may shower immediately.  Sutures will absorb and need not be removed.  If present, leave the steri-strips (white paper tapes) in place for 14 days after surgery.  If present, leave Dermabond glue in place until it wears/flakes off.  Do not apply lotions, creams, or ointments to incisions.  Expect a variable amount of swelling/black and blue discoloration that may involve the penis/scrotum or labia.  Some numbness around the incision is common.  A lump/ridge under the incision is normal and will gradually resolve.    DISCOMFORT:  Local anesthetic placed at surgery should provide relief for 4-8 hours.  Begin taking pain pills before discomfort is severe.  Take the pain medication  with some food, when possible, to minimize side effects.  Intermittent use of ice packs to the hernia repair site may help during the first 1-3 weeks after surgery.  Expect gradual improvement.    Over-the-counter anti-inflammatory medications (i.e. Ibuprofen/Advil/Motrin or Naprosyn/Aleve) may be used per package instructions in addition to or while tapering off the narcotic pain medications to decrease swelling and sensitivity at the repair site.  DO NOT TAKE these Anti-inflammatory medications if your primary physician has advised against doing so, or if you have acid reflux, ulcer, or bleeding disorder, or take blood-thinner medications.  Call your primary physician or the surgery office if you have medication questions.    FOLLOW-UP AFTER SURGERY:  -Our office will contact you approximately 2-3 weeks after surgery to check on your progress and answer any questions you may have.  If you are doing well, you will not need to return for an office appointment.  If any concerns are identified over the phone, we will help you make an appointment to see a provider.    -If you have not received a phone call, have any questions or concerns, or would like to be seen, please call us at [phone].  We are located at: [address], [address]; [place] [address]    -CONTACT US IF THE FOLLOWING DEVELOPS:   1. A fever that is above 101     2. Increased redness, warmth, drainage, bleeding, or swelling.   3. Pain that is not relieved by rest/ice and your prescription.   4.  Increasing pain after 48 hours.   5. Drainage that is thick, cloudy, yellow, green or white.   6. Any other questions or concerns.      FREQUENTLY ASKED QUESTIONS:    Q:  How should my incision look?    A:  Normally your incision will appear slightly swollen with light redness directly along the incision itself as it heals.  It may feel like a bump or ridge as the healing/scarring happens, and over time (3-4 months) this bump or ridge feeling  should slowly go away.  In general, clear or pink watery drainage can be normal at first as your incision heals, but should decrease over time.    Q:  How do I know if my incision is infected?  A:  Look at your incision for signs of infection, like redness around the incision spreading to surrounding skin, or drainage of cloudy or foul-smelling drainage.  If you feel warm, check your temperature to see if you are running a fever.    **If any of these things occur, please notify the nurse at our office.  We may need you to come into the office for an incision check.      Q:  How do I take care of my incision?  A:  If you have a dressing in place - Starting the day after surgery, replace the dressing 1-2 times a day until there is no further drainage from the incision.  At that time, a dressing is no longer needed.  Try to minimize tape on the skin if irritation is occurring at the tape sites.  If you have significant irritation from tape on the skin, please call the office to discuss other method of dressing your incision.    Small pieces of tape called  steri-strips  may be present directly overlying your incision; these may be removed 10 days after surgery unless otherwise specified by your surgeon.  If these tapes start to loosen at the ends, you may trim them back until they fall off or are removed.    A:  If you had  Dermabond  tissue glue used as a dressing (this causes your incision to look shiny with a clear covering over it) - This type of dressing wears off with time and does not require more dressings over the top unless it is draining around the glue as it wears off.  Do not apply ointments or lotions over the incisions until the glue has completely worn off.    Q:  There is a piece of tape or a sticky  lead  still on my skin.  Can I remove this?  A:  Sometimes the sticky  leads  used for monitoring during surgery or for evaluation in the emergency department are not all removed while you are in the  hospital.  These sometimes have a tab or metal dot on them.  You can easily remove these on your own, like taking off a band-aid.  If there is a gel substance under the  lead , simply wipe/clean it off with a washcloth or paper towel.      Q:  What can I do to minimize constipation (very hard stools, or lack of stools)?  A:  Stay well hydrated.  Increase your dietary fiber intake or take a fiber supplement -with plenty of water.  Walk around frequently.  You may consider an over-the-counter stool-softener.  Your Pharmacist can assist you with choosing one that is stocked at your pharmacy.  Constipation is also one of the most common side effects of pain medication.  If you are using pain medication, be pro-active and try to PREVENT problems with constipation by taking the steps above BEFORE constipation becomes a problem.    Q:  What do I do if I need more pain medications?  A:  Call the office to receive refills.  Be aware that certain pain meds cannot be called into a pharmacy and actually require a paper prescription.  A change may be made in your pain med as you progress thru your recovery period or if you have side effects to certain meds.    --Pain meds are NOT refilled after 5pm on weekdays, and NOT AT ALL on the weekends, so please look ahead to prevent problems.    Q:  Why am I having a hard time sleeping now that I am at home?  A:  Many medications you receive while you are in the hospital can impact your sleep for a number of days after your surgery/hospitalization.  Decreased level of activity and naps during the day may also make sleeping at night difficult.  Try to minimize day-time naps, and get up frequently during the day to walk around your home during your recovery time.  Sleep aides may be of some help, but are not recommended for long-term use.      Q:  I am having some back discomfort.  What should I do?  A:  This may be related to certain positioning that was required for your surgery,  extended periods of time in bed, or other changes in your overall activity level.  You may try ice, heat, acetaminophen, or ibuprofen to treat this temporarily.  Note that many pain medications have acetaminophen in them and would state this on the prescription bottle.  Be sure not to exceed the maximum of 4000mg per day of acetaminophen.     **If the pain you are having does not resolve, is severe, or is a flare of back pain you have had on other occasions prior to surgery, please contact your primary physician for further recommendations or for an appointment to be examined at their office.    Q:  Why am I having headaches?  A:  Headaches can be caused by many things:  caffeine withdrawal, use of pain meds, dehydration, high blood pressure, lack of sleep, over-activity/exhaustion, flare-up of usual migraine headaches.  If you feel this is related to muscle tension (a band-like feeling around the head, or a pressure at the low-back of the head) you may try ice or heat to this area.  You may need to drink more fluids (try electrolyte drink like Gatorade), rest, or take your usual migraine medications.   **If your headaches do not resolve, worsen, are accompanied by other symptoms, or if your blood pressure is high, please call your primary physician for recommendation and/or examination.    Q:  I am unable to urinate.  What do I do?  A:  A small percentage of people can have difficulty urinating initially after surgery.  This includes being able to urinate only a very small amount at a time and feeling discomfort or pressure in the very low abdomen.  This is called  urinary retention , and is actually an urgent situation.  Proceed to your nearest Emergency department for evaluation (not an Urgent Care Center).  Sometimes the bladder does not work correctly after certain medications you receive during surgery, or related to certain procedures.  You may need to have a catheter placed until your bladder recovers.  When  planning to go to an Emergency department, it may help to call the ER to let them know you are coming in for this problem after a surgery.  This may help you get in quicker to be evaluated.  **If you have symptoms of a urinary tract infection, please contact your primary physician for the proper evaluation and treatment.        If you have other questions, please call the office Monday thru Friday between 8am and 4:30pm to discuss with the nurse or physician assistant.  #[phone]    There is a surgeon ON CALL on weekday evenings and over the weekend in case of urgent need only, and may be contacted at the same number.    If you are having an emergency, call 911 or proceed to your nearest emergency department.

## 2022-08-12 NOTE — ANESTHESIA CARE TRANSFER NOTE
Patient: Garrett Childers    Procedure: Procedure(s):  1. Xi robotic assisted laparoscopic radical prostatectomy with bilateral pelvic lymph node dissection with vesicopexy  2. Xi Robotic Assisted HERNIORRHAPHY, INGUINAL, LAPAROSCOPIC, WITH MESH       Diagnosis: Prostate cancer (H) [C61]  Right inguinal hernia [K40.90]  Diagnosis Additional Information: No value filed.    Anesthesia Type:   General     Note:    Oropharynx: oral airway in place  Level of Consciousness: drowsy  Oxygen Supplementation: face mask  Level of Supplemental Oxygen (L/min / FiO2): 6  Independent Airway: airway patency satisfactory and stable  Dentition: dentition unchanged  Vital Signs Stable: post-procedure vital signs reviewed and stable  Report to RN Given: handoff report given  Patient transferred to: PACU    Handoff Report: Identifed the Patient, Identified the Reponsible Provider, Reviewed the pertinent medical history, Discussed the surgical course, Reviewed Intra-OP anesthesia mangement and issues during anesthesia, Set expectations for post-procedure period and Allowed opportunity for questions and acknowledgement of understanding      Vitals:  Vitals Value Taken Time   BP     Temp     Pulse 64 08/12/22 1828   Resp 15 08/12/22 1828   SpO2 100 % 08/12/22 1828   Vitals shown include unvalidated device data.    Electronically Signed By: GEE Hu CRNA  August 12, 2022  6:29 PM

## 2022-08-12 NOTE — ANESTHESIA PROCEDURE NOTES
Airway       Patient location during procedure: OR       Procedure Start/Stop Times: 8/12/2022 12:04 PM  Staff -        CRNA: Lamont Magana APRN CRNA       Performed By: CRNA  Consent for Airway        Urgency: elective  Indications and Patient Condition       Indications for airway management: michelle-procedural       Induction type:intravenous       Mask difficulty assessment: 1 - vent by mask    Final Airway Details       Final airway type: endotracheal airway       Successful airway: ETT - single and Oral  Endotracheal Airway Details        ETT size (mm): 8.0       Cuffed: yes       Successful intubation technique: direct laryngoscopy       DL Blade Type: Owusu 2       Grade View of Cords: 1       Adjucts: stylet       Position: Right       Measured from: lips       Secured at (cm): 23       Bite block used: None    Post intubation assessment        Placement verified by: capnometry, equal breath sounds and chest rise        Number of attempts at approach: 1       Secured with: plastic tape       Ease of procedure: easy       Dentition: Intact and Unchanged    Medication(s) Administered   Medication Administration Time: 8/12/2022 12:04 PM

## 2022-08-12 NOTE — OP NOTE
Procedure Date: 08/12/2022    PREOPERATIVE DIAGNOSIS:  Symptomatic right inguinal hernia.    POSTOPERATIVE DIAGNOSIS:  Symptomatic right inguinal hernia.    PROCEDURE:  Robotic-assisted laparoscopic repair of right inguinal hernia with placement of ProGrip underlay mesh.    ANESTHESIA:  General plus local.    SURGEON:  Rizwan Mcmahon MD    ASSISTANT:  Kingsley Stauffer PA-C.  The physician assistant was medically necessary for his skills in suturing, cutting suture, exposure, exchange of instruments and fashioning of the mesh throughout the operation.    SPECIMENS:  None from this portion of the operation.    COMPLICATIONS:  None.    INDICATIONS FOR PROCEDURE:  Mr. Childers is a 66-year-old gentleman who has had a previous left inguinal hernia repair with mesh by Dr. Michael who had a new diagnosis of prostate cancer and was found to have a right inguinal hernia during the course of his imaging and staging workup.  The patient had a clinically evident hernia on exam in my office and as he was to undergo a robotic prostatectomy, was inquiring as to whether he could have his hernia repaired at the same time to obviate the possibility of problems or symptoms down the road.  After discussing the matter with Dr. López as well of reviewing the literature on the subject, we concluded that this would be a safe and reasonable approach.  Risks of the operation were therefore discussed with him in detail.  These include infection, bleeding, harm to structures, mesh infection, hernia recurrence, chronic pain and testicular loss.  The patient verbalized understanding of the above and consented to proceed.    FINDINGS:  The patient had right sided indirect defect consisting of a lobule of fat emanating through the internal ring.  There was no organized sac.  This was repaired with a ProGrip underlay mesh.    DESCRIPTION OF PROCEDURE:  With the patient under excellent general anesthesia in supine position,.    DESCRIPTION OF PROCEDURE:   At the time of my team's arrival, the patient was under excellent general anesthesia and had the da Jesus surgical robot docked with 3 operative arms and an assistant port in the right lower quadrant.  The bladder flap had been taken down to the level of the prostate.  I surveyed superior lateral to this site and scored the peritoneum in the typical location from lateral to medial just above the ASIS towards the median umbilical ligament.  The preperitoneal space was then developed using electrocautery and blunt dissection.  This is done, both lateral and medial to the epigastric vessels.  The posterior aspect of the pubis had already been cleared of fat during the mobilization.  We identified the peritoneal reflection, which did not go through the internal ring.  This was grasped and dissected free from the cord for about 4 cm.  Palpating along the lateral edge of the internal ring, we discovered a large lobule of preperitoneal fat, which was grasped and reduced and eventually amputated and set aside to be retrieved with the lymphadenectomy specimens later.  We introduced a ProGrip mesh into the field, folded in quarters and rounded the edges.  This was placed through the assistant port and unfolded in the preperitoneal space such that the center was over the internal ring.  The peritoneal flap was then closed with a running 3-0 V-Loc stitch and a second V-Loc stitch was used to close the more medial defect in the peritoneum, thus covering the mesh entirely from the peritoneal structures.  Final survey of the low pelvis was made in the surgery was turned over to Dr. López and his team for the remainder of the operation to include prostatectomy.  At this juncture, all sharps, instruments and sponge counts were correct.    Rizwan Lui MD        D: 2022   T: 2022   MT: PARAM    Name:     DHIRAJ CARDOZA  MRN:      [id number]        Account:        [id number]   :      1955            Procedure Date: 08/12/2022     Document: [id number]    cc:  Bong Vanegas MD

## 2022-08-12 NOTE — BRIEF OP NOTE
"[facility]    Brief Operative Note    Pre-operative diagnosis: Prostate cancer (H) [C61]  Right inguinal hernia [K40.90]  Post-operative diagnosis Same as pre-operative diagnosis    Procedure: Procedure(s):  1. Xi robotic assisted laparoscopic radical prostatectomy with bilateral pelvic lymph node dissection with vesicopexy  2. Xi Robotic Assisted HERNIORRHAPHY, INGUINAL, LAPAROSCOPIC, WITH MESH  Surgeon: Surgeon(s) and Role:  Panel 1:     * Luke López MD - Primary  Panel 2:     * Rizwan Gibbs MD - Primary     * Kingsley Stauffer PA-C - Assisting  Anesthesia: General   Estimated Blood Loss: 200 mL from 8/12/2022 11:56 AM to 8/12/2022  6:26 PM      Drains:  18 Fr june (15 mL in balloon), 19 Fr Duong drain in LLQ  Specimens:   ID Type Source Tests Collected by Time Destination   1 : PERITONEAL NODULE FROM POSTERIOR SURFACE OF BLADDER Tissue Peritoneum SURGICAL PATHOLOGY EXAM Luke López MD 8/12/2022  1:13 PM    2 : LEFT NEUROVASCULAR BUNDLE MARGIN Tissue Pelvis, Left SURGICAL PATHOLOGY EXAM Luke López MD 8/12/2022  4:25 PM    3 : Prostate Tissue Prostate SURGICAL PATHOLOGY EXAM Luke López MD 8/12/2022  5:55 PM    4 : bilateral pelvic lymph nodes Tissue Lymph Nodes, Pelvis, Regional SURGICAL PATHOLOGY EXAM Luke López MD 8/12/2022  6:24 PM    5 : right inguinal hernia  Tissue Hernia Sac, Inguinal, Right SURGICAL PATHOLOGY EXAM Luke López MD 8/12/2022  6:25 PM      Findings:   Prostate and bilateral pelvic lymph nodes with grossly negative margins, bilateral nerve sparing, water tight vesicourethral anastomosis; right inguinal hernia containing fat repaired with mesh  Complications: None.  Implants:   Implant Name Type Inv. Item Serial No.  Lot No. LRB No. Used Action   MESH PROGRIP LAPAROSCOPIC 5.9X3.9\" PARIETEX SELF-FIX [id number] - [id number] Mesh MESH PROGRIP LAPAROSCOPIC 5.9X3.9\" PARIETEX SELF-FIX [id number]  COVIDIEN " [id number] Right 1 Implanted         Post-op plan:  -Admit to Urology overnight'  -No toradol  -CLD, ADAT, IVF, bowel regimen  -Continue june on discharge  -REINA removal prior to discharge pending outputs  -Hgb and BMP in PACU  -CBC and BMP in AM  -Ok for SQH pending AM Hgb  -Up ad luis f  -Floor, likely discharge home tomorrow    Charlie Davis MD  Urology, PGY-5  (p) [phone]

## 2022-08-12 NOTE — OP NOTE
[facility]  Operative Note    Pre-operative diagnosis: Prostate cancer (H) [C61]  Right inguinal hernia [K40.90]   Post-operative diagnosis Prostate cancer (H) [C61]  Right inguinal hernia [K40.90]   Procedure: Procedure(s):  1. Xi Robotic assisted laparoscopic radical prostatectomy with bilateral pelvic lymph node dissection with vesicopexy  2. Xi Robotic Assisted RIGHT HERNIORRHAPHY, INGUINAL, LAPAROSCOPIC, WITH MESH   Surgeon: Luke López MD   Assistants(s): Charlie Davis MD   Anesthesia: General    Estimated blood loss: 200 ml    Total IV fluids: (See anesthesia record)   Blood transfusion: No transfusion was given during surgery   Total urine output: Not measured   Drains: 18 Fr Orellana catheter, 15 ml in ball   Specimens: ID Type Source Tests Collected by Time Destination   1 : PERITONEAL NODULE FROM POSTERIOR SURFACE OF BLADDER Tissue Peritoneum SURGICAL PATHOLOGY EXAM Luke López MD 8/12/2022  1:13 PM    2 : LEFT NEUROVASCULAR BUNDLE MARGIN Tissue Pelvis, Left SURGICAL PATHOLOGY EXAM Luek López MD 8/12/2022  4:25 PM    3 : Prostate Tissue Prostate SURGICAL PATHOLOGY EXAM Luke López MD 8/12/2022  5:55 PM    4 : bilateral pelvic lymph nodes Tissue Lymph Nodes, Pelvis, Regional SURGICAL PATHOLOGY EXAM Luke López MD 8/12/2022  6:24 PM    5 : right inguinal hernia  Tissue Hernia Sac, Inguinal, Right SURGICAL PATHOLOGY EXAM Luke López MD 8/12/2022  6:25 PM         Implants: None     Findings:   1 cm peritoneal nodule on the posterior bladder, frozen section negative for malignancy.  No gross local extension of disease or grossly positive lymph nodes  Bilateral nerve spring  vesicopexy performed to cover the inguinal hernia repair mesh     Complications: None.   Condition: Stable             Description of procedure:  66-year-old man with history of BPH with LUTS, positive family history of prostate cancer, intermediate risk clinically localized  prostate cancer (iPSA 5.20, Sadie 3+4=7, 4/15 cores), history of left inguinal hernia status post open repair 2015, who presents today for surgical treatment of prostate cancer as well as right inguinal hernia with a combined case.  I discussed with Dr. Mcmahon the feasibility of performing a robotic assisted laparoscopic repair of his right inguinal hernia at the same time as the prostatectomy.  We reviewed the literature and found that this is a safe and effective approach.  Patient elects to proceed with both operations under the same anesthesia.  Risks of prostatectomy including bleeding, damage to surrounding structures including bowel and nerves, urinary incontinence, urinary leak, erectile dysfunction were discussed.  Informed consent was obtained.    The patient was brought to operating room #15.  Ancef antibiotics were given prior to the procedure.  General anesthesia was induced, he was placed in supine position, shaved, prepped and draped in standard sterile fashion.  A 20 Frisian Orellana catheter was placed and left to gravity drainage.  A timeout was performed.    Initial entry to the abdomen was obtained using a Veress needle in the midline about 2 fingerbreadths superior to the umbilicus. CO2 pneumoperitoneum was achieved.  An 8 mm robotic port was placed for the camera and 4 additional ports were placed under direct vision: 2 left-sided 8 mm robotic ports, a right sided 8 mm robotic port medially, and a 12 mm air seal assistant port laterally on the right, in the usual fashion.  The patient was placed in Trendelenburg position and the da Jesus Xi robot was docked.  A 1 cm peritoneal nodule was noted on the posterior bladder.  This was carefully removed and sent to pathology.  Frozen section revealed calcifications but no malignancy.  Left lower quadrant sigmoid colon adhesions were taken down.  The prostatectomy was started using a posterior approach.  About 1.5 cm above the peritoneal reflection in  the posterior cul de sac the peritoneum was opened up.  Bilateral vasa deferentia and seminal vesicles were identified and dissected out.  The posterior plane between the prostate and the rectum was developed.  The bladder was then dropped in the usual fashion.  The prior left inguinal hernia repair mesh was noted. The pubic bone was cleared off and the endopelvic fascia were incised and opened up.  The right pelvic lymphadenectomy was then performed in preparation for Dr. Mcmahon performing his hernia repair.  Lymph nodes overlying the right external iliac vein and in the obturator fossa were removed.  Weck clips were used to control the lymphatics.  The right obturator nerve was carefully spared.  Dr. Mcmahon then entered the operating room and performed a right inguinal hernia repair with mesh and removed a fat-containing hernia sac.  The hernia sac was set aside in the pelvis for later extraction.  The prostatectomy then proceeded. A 2-0 V loc was used to control the deep dorsal venous complex.  The anterior bladder neck was taken down with cautery, followed by the posterior bladder neck.  The accessory structures were identified and brought up through the incision.  Upon further inspection it was noted that the posterior dissection had initially been partially performed in the simple prostatectomy plane so Denonvillier's fascia was opened up and the posterior dissection was repeated in the correct plane.  Bilateral nerve sparing was then performed. An extra margin was taken from the left neurovascular bundle and sent for permanent pathology. The prostatic pedicles were controlled using Weck clips.  The urethra was then divided leaving a long urethral stump and the prostatectomy specimen was set aside.  The left pelvic lymphadenectomy was then performed; the external iliac and obturator nodes were removed, with careful preservation of the left obturator nerve.  Surgicel was placed in the obturator fossas  bilaterally as well as over the neurovascular bundles for hemostasis.  Vesicourethral anastomosis was then performed using running 3-0 V Loc.  A new 18 Guyanese Orellana catheter was placed after completion of the anastomosis and the balloon was filled with 15 mL sterile water.  The bladder was irrigated with return of clear effluent.  The anastomosis appeared to be watertight.  Dr. Mcmahon reentered the room and examined the hernia mesh.  We decided to perform a vesicopexy to provide additional coverage of the hernia repair mesh.  Running V-Loc suture was then used to fix the bladder up to the cut peritoneal edge circumferentially..  Prior to completion of the vesicopexy, a 19 Guyanese Duong drain was left anterior to the bladder near the vesicourethral anastomosis and brought out the left lateral robotic port. The drain was secured with a silk drain stitch.  All specimens were bagged.  A Jordan-Connie device was used to close the assistant port.  The abdomen was desufflated and all ports removed.  The midline camera port was opened up and the specimens were extracted.  The fascia of the extraction site was closed using 0 Vicryl figure-of-eight sutures.  Final skin closure was performed using 4-0 Monocryl running subcuticular suture.  Local anesthetic was infiltrated into the incisions and Exofin surgical glue was used for final dressing.  A drain dressing was applied to the Duong drain site.  The Duong drain was left to bulb suction.  Patient was cleaned up, awoken from anesthesia and brought to PACU in stable condition.    Luke López MD   [facility]  [phone] clinic phone

## 2022-08-13 VITALS
HEART RATE: 65 BPM | TEMPERATURE: 98.7 F | OXYGEN SATURATION: 97 % | BODY MASS INDEX: 28.32 KG/M2 | SYSTOLIC BLOOD PRESSURE: 152 MMHG | HEIGHT: 73 IN | RESPIRATION RATE: 16 BRPM | WEIGHT: 213.7 LBS | DIASTOLIC BLOOD PRESSURE: 85 MMHG

## 2022-08-13 LAB
ANION GAP SERPL CALCULATED.3IONS-SCNC: 8 MMOL/L (ref 7–15)
BUN SERPL-MCNC: 13.6 MG/DL (ref 8–23)
CALCIUM SERPL-MCNC: 8.2 MG/DL (ref 8.8–10.2)
CHLORIDE SERPL-SCNC: 104 MMOL/L (ref 98–107)
CREAT SERPL-MCNC: 0.94 MG/DL (ref 0.67–1.17)
DEPRECATED HCO3 PLAS-SCNC: 27 MMOL/L (ref 22–29)
ERYTHROCYTE [DISTWIDTH] IN BLOOD BY AUTOMATED COUNT: 12.9 % (ref 10–15)
GFR SERPL CREATININE-BSD FRML MDRD: 90 ML/MIN/1.73M2
GLUCOSE SERPL-MCNC: 109 MG/DL (ref 70–99)
HCT VFR BLD AUTO: 38 % (ref 40–53)
HGB BLD-MCNC: 12.3 G/DL (ref 13.3–17.7)
MCH RBC QN AUTO: 31.5 PG (ref 26.5–33)
MCHC RBC AUTO-ENTMCNC: 32.4 G/DL (ref 31.5–36.5)
MCV RBC AUTO: 97 FL (ref 78–100)
PLATELET # BLD AUTO: 144 10E3/UL (ref 150–450)
POTASSIUM SERPL-SCNC: 3.9 MMOL/L (ref 3.4–5.3)
RBC # BLD AUTO: 3.91 10E6/UL (ref 4.4–5.9)
SODIUM SERPL-SCNC: 139 MMOL/L (ref 136–145)
WBC # BLD AUTO: 8.8 10E3/UL (ref 4–11)

## 2022-08-13 PROCEDURE — 36415 COLL VENOUS BLD VENIPUNCTURE: CPT | Performed by: STUDENT IN AN ORGANIZED HEALTH CARE EDUCATION/TRAINING PROGRAM

## 2022-08-13 PROCEDURE — 258N000003 HC RX IP 258 OP 636: Performed by: STUDENT IN AN ORGANIZED HEALTH CARE EDUCATION/TRAINING PROGRAM

## 2022-08-13 PROCEDURE — 82310 ASSAY OF CALCIUM: CPT | Performed by: STUDENT IN AN ORGANIZED HEALTH CARE EDUCATION/TRAINING PROGRAM

## 2022-08-13 PROCEDURE — 250N000011 HC RX IP 250 OP 636: Performed by: STUDENT IN AN ORGANIZED HEALTH CARE EDUCATION/TRAINING PROGRAM

## 2022-08-13 PROCEDURE — 250N000013 HC RX MED GY IP 250 OP 250 PS 637: Performed by: STUDENT IN AN ORGANIZED HEALTH CARE EDUCATION/TRAINING PROGRAM

## 2022-08-13 PROCEDURE — 96372 THER/PROPH/DIAG INJ SC/IM: CPT | Performed by: STUDENT IN AN ORGANIZED HEALTH CARE EDUCATION/TRAINING PROGRAM

## 2022-08-13 PROCEDURE — 85027 COMPLETE CBC AUTOMATED: CPT | Performed by: STUDENT IN AN ORGANIZED HEALTH CARE EDUCATION/TRAINING PROGRAM

## 2022-08-13 RX ORDER — CIPROFLOXACIN 500 MG/1
500 TABLET, FILM COATED ORAL ONCE
Qty: 1 TABLET | Refills: 0 | Status: SHIPPED | OUTPATIENT
Start: 2022-08-13 | End: 2022-08-13

## 2022-08-13 RX ORDER — ACETAMINOPHEN 325 MG/1
650 TABLET ORAL EVERY 4 HOURS PRN
Qty: 100 TABLET | Refills: 0 | Status: SHIPPED | OUTPATIENT
Start: 2022-08-13 | End: 2022-12-15

## 2022-08-13 RX ORDER — OXYCODONE HYDROCHLORIDE 5 MG/1
5 TABLET ORAL EVERY 6 HOURS PRN
Qty: 9 TABLET | Refills: 0 | Status: SHIPPED | OUTPATIENT
Start: 2022-08-13 | End: 2022-08-16

## 2022-08-13 RX ORDER — AMOXICILLIN 250 MG
1 CAPSULE ORAL 2 TIMES DAILY PRN
Qty: 30 TABLET | Refills: 1 | Status: SHIPPED | OUTPATIENT
Start: 2022-08-13 | End: 2022-12-15

## 2022-08-13 RX ORDER — OXYBUTYNIN CHLORIDE 5 MG/1
5 TABLET, EXTENDED RELEASE ORAL DAILY PRN
Qty: 14 TABLET | Refills: 0 | Status: SHIPPED | OUTPATIENT
Start: 2022-08-13 | End: 2022-08-23

## 2022-08-13 RX ADMIN — OXYCODONE HYDROCHLORIDE 5 MG: 5 TABLET ORAL at 12:56

## 2022-08-13 RX ADMIN — SENNOSIDES AND DOCUSATE SODIUM 1 TABLET: 50; 8.6 TABLET ORAL at 08:14

## 2022-08-13 RX ADMIN — SODIUM CHLORIDE: 9 INJECTION, SOLUTION INTRAVENOUS at 03:49

## 2022-08-13 RX ADMIN — HYDROMORPHONE HYDROCHLORIDE 0.2 MG: 0.2 INJECTION, SOLUTION INTRAMUSCULAR; INTRAVENOUS; SUBCUTANEOUS at 03:47

## 2022-08-13 RX ADMIN — ACETAMINOPHEN 650 MG: 325 TABLET ORAL at 14:20

## 2022-08-13 RX ADMIN — OXYCODONE HYDROCHLORIDE 5 MG: 5 TABLET ORAL at 08:15

## 2022-08-13 RX ADMIN — HYDROMORPHONE HYDROCHLORIDE 0.2 MG: 0.2 INJECTION, SOLUTION INTRAMUSCULAR; INTRAVENOUS; SUBCUTANEOUS at 01:34

## 2022-08-13 RX ADMIN — ACETAMINOPHEN 650 MG: 325 TABLET ORAL at 08:14

## 2022-08-13 RX ADMIN — ACETAMINOPHEN 650 MG: 325 TABLET ORAL at 01:29

## 2022-08-13 RX ADMIN — HEPARIN SODIUM 5000 UNITS: 10000 INJECTION, SOLUTION INTRAVENOUS; SUBCUTANEOUS at 10:29

## 2022-08-13 ASSESSMENT — ACTIVITIES OF DAILY LIVING (ADL)
ADLS_ACUITY_SCORE: 22
ADLS_ACUITY_SCORE: 20
ADLS_ACUITY_SCORE: 22
ADLS_ACUITY_SCORE: 22

## 2022-08-13 NOTE — PLAN OF CARE
PRIMARY DIAGNOSIS: Laparoscopic Prostatectomy   OUTPATIENT/OBSERVATION GOALS TO BE MET BEFORE DISCHARGE:  1. Stable vital signs Yes  2. Tolerating diet:Yes  3. Pain controlled with oral pain medications:  Yes  4. Positive bowel sounds:  Yes  5. Voiding without difficulty:  Yes, discharged home with june.  6. Able to ambulate:  Yes  7. Provider specific discharge goals met:  Yes    Discharge Planner Nurse   Safe discharge environment identified: Yes  Barriers to discharge: No       Entered by: Barbara Martinez RN 08/13/2022 4:05 PM     Pt going home with june. Leg back provided. Discharge home with wife.    Please review provider order for any additional goals.   Nurse to notify provider when observation goals have been met and patient is ready for discharge.

## 2022-08-13 NOTE — PLAN OF CARE
PRIMARY DIAGNOSIS: Laparoscopic Prostatectomy  OUTPATIENT/OBSERVATION GOALS TO BE MET BEFORE DISCHARGE:  1. Stable vital signs Yes  2. Tolerating diet:Yes  3. Pain controlled with oral pain medications:  Yes  4. Positive bowel sounds:  Yes  5. Voiding without difficulty:   going home with june  6. Able to ambulate:  No  7. Provider specific discharge goals met:  No    Discharge Planner Nurse   Safe discharge environment identified: Yes  Barriers to discharge: Yes       Entered by: Barbara Martinez RN 08/13/2022 9:21 AM     VSS. Pain controlled with tylenol and oxycodone, tolerates regular diet, belching, no passing gas. Haven't ambulated. Lap sites intact, REINA drain intact, june intact.    Please review provider order for any additional goals.   Nurse to notify provider when observation goals have been met and patient is ready for discharge.

## 2022-08-13 NOTE — PROGRESS NOTES
[facility] Urology Progress Note          Assessment and Plan:     Active Problems:    Prostate cancer (H)    Assessment: POD #1 s/p RALP/BPLND and inguinal hernia repair    Plan:   - Advance diet as tolerated  - Encourage ambulation  - Anticipate discharge home today  - Remove REINA drain prior to discharge  - Labs noted and stable and nonconcerning      Danis Josue MD   [facility]  Office: [phone]               Interval History:     doing well; no cp, sob, n/v/d, or abd pain. Orellana clear              Review of Systems:     The 5 point Review of Systems is negative other than noted in the HPI             Medications:     Current Facility-Administered Medications Ordered in Epic   Medication Dose Route Frequency Last Rate Last Admin     acetaminophen (TYLENOL) tablet 650 mg  650 mg Oral Q6H   650 mg at 08/13/22 0814     heparin ANTICOAGULANT injection 5,000 Units  5,000 Units Subcutaneous Q8H   5,000 Units at 08/13/22 1029     hydrALAZINE (APRESOLINE) injection 10 mg  10 mg Intravenous Q6H PRN         HYDROmorphone (DILAUDID) injection 0.2 mg  0.2 mg Intravenous Q2H PRN   0.2 mg at 08/13/22 0347    Or     HYDROmorphone (DILAUDID) injection 0.4 mg  0.4 mg Intravenous Q2H PRN   0.4 mg at 08/12/22 2326     labetalol (NORMODYNE/TRANDATE) syringe 20 mg  20 mg Intravenous Q4H PRN         lidocaine (LMX4) cream   Topical Q1H PRN         lidocaine (XYLOCAINE) 5 % ointment   Topical 4x Daily PRN         lidocaine 1 % 0.1-1 mL  0.1-1 mL Other Q1H PRN         naloxone (NARCAN) injection 0.2 mg  0.2 mg Intravenous Q2 Min PRN        Or     naloxone (NARCAN) injection 0.4 mg  0.4 mg Intravenous Q2 Min PRN        Or     naloxone (NARCAN) injection 0.2 mg  0.2 mg Intramuscular Q2 Min PRN        Or     naloxone (NARCAN) injection 0.4 mg  0.4 mg Intramuscular Q2 Min PRN         neomycin-bacitracin-polymyxin (NEOSPORIN) ointment   Topical 4x Daily PRN         ondansetron (ZOFRAN ODT) ODT tab 4 mg  4 mg  Oral Q6H PRN        Or     ondansetron (ZOFRAN) injection 4 mg  4 mg Intravenous Q6H PRN         opium-belladonna (B&O SUPPRETTES) 30-16.2 MG per suppository 1 suppository  30 mg Rectal TID PRN         oxyCODONE (ROXICODONE) tablet 5 mg  5 mg Oral Q4H PRN   5 mg at 08/13/22 0815    Or     oxyCODONE (ROXICODONE) tablet 10 mg  10 mg Oral Q4H PRN         polyethylene glycol (MIRALAX) Packet 17 g  17 g Oral Daily   17 g at 08/12/22 1951     prochlorperazine (COMPAZINE) injection 5 mg  5 mg Intravenous Q6H PRN        Or     prochlorperazine (COMPAZINE) tablet 5 mg  5 mg Oral Q6H PRN         senna-docusate (SENOKOT-S/PERICOLACE) 8.6-50 MG per tablet 1 tablet  1 tablet Oral BID   1 tablet at 08/13/22 0814     simvastatin (ZOCOR) tablet 20 mg  20 mg Oral At Bedtime   20 mg at 08/12/22 2107     sodium chloride (PF) 0.9% PF flush 3 mL  3 mL Intracatheter Q8H   3 mL at 08/12/22 1952     sodium chloride (PF) 0.9% PF flush 3 mL  3 mL Intracatheter q1 min prn         sodium chloride 0.9% infusion   Intravenous Continuous   Stopped at 08/13/22 0927     [Held by provider] triamterene-HCTZ (MAXZIDE-25) 37.5-25 MG per tablet 1 tablet  1 tablet Oral Daily         Current Outpatient Medications Ordered in Epic   Medication     acetaminophen (TYLENOL) 325 MG tablet     oxybutynin ER (DITROPAN XL) 5 MG 24 hr tablet     senna-docusate (SENOKOT-S/PERICOLACE) 8.6-50 MG tablet                  Physical Exam:   Vitals were reviewed  Patient Vitals for the past 8 hrs:   BP Temp Temp src Pulse Resp SpO2   08/13/22 0746 132/56 98.7  F (37.1  C) Oral 62 16 98 %     GEN: NAD, lying in bed  HEENT: EOMI  NECK: Supple  ABD: soft, non-tender, incisions intact   EXT: No LE edema  : Orellana draining clear  REINA: serosanguinous            Data:     Lab Results   Component Value Date    CR 0.94 08/13/2022    CR 1.07 08/12/2022    CR 0.86 08/12/2022    CR 0.82 06/01/2022    CR 0.90 03/25/2022     Lab Results   Component Value Date    WBC 8.8 08/13/2022     WBC 4.4 08/12/2022    WBC 4.9 03/25/2022    HGB 12.3 (L) 08/13/2022    HGB 15.1 08/12/2022    HGB 15.3 08/12/2022    HCT 38.0 (L) 08/13/2022    HCT 46.5 08/12/2022    HCT 47.6 03/25/2022    MCV 97 08/13/2022    MCV 96 08/12/2022    MCV 95 03/25/2022     (L) 08/13/2022     08/12/2022     03/25/2022     No results found for: INR

## 2022-08-13 NOTE — PLAN OF CARE
Goal Outcome Evaluation:      To Do:  End of Shift Summary  For vital signs and complete assessments, please see documentation flowsheets.     Pertinent assessments: Pt A/O. VSS. Capno, on RA. Denies nausea and SOB Scheduled Tylenol and Dilaudid prn for abdo pain. Lap sies x4, dermabonded. Left REINA drain with dark red output. June patent.  Major Shift Events: uneventful  Treatment Plan: symptom management, REINA drain, june, encourage to ambulate and poss discaharge today.  Bedside Nurse: Summer Bledsoe RN

## 2022-08-13 NOTE — PLAN OF CARE
Pertinent assessments: A&Ox4. Capno in place, on RA. C/o pain, scheduled tylenol and prn dilaudid given x1. REINA in place with serosanguinous output. June patent with red output. Tolerating regular diet.    Major Shift Events: admitted to floor    Treatment Plan: pain management, june, ambulate    Bedside Nurse: Mary Rivera RN

## 2022-08-13 NOTE — ANESTHESIA POSTPROCEDURE EVALUATION
Patient: Garrett Childers    Procedure: Procedure(s):  1. Xi robotic assisted laparoscopic radical prostatectomy with bilateral pelvic lymph node dissection with vesicopexy  2. Xi Robotic Assisted HERNIORRHAPHY, INGUINAL, LAPAROSCOPIC, WITH MESH       Anesthesia Type:  General    Note:     Postop Pain Control: Uneventful            Sign Out: Well controlled pain   PONV: No   Neuro/Psych: Uneventful            Sign Out: Acceptable/Baseline neuro status   Airway/Respiratory: Uneventful            Sign Out: Acceptable/Baseline resp. status   CV/Hemodynamics: Uneventful            Sign Out: Acceptable CV status; No obvious hypovolemia; No obvious fluid overload   Other NRE: NONE   DID A NON-ROUTINE EVENT OCCUR? No           Last vitals:  Vitals Value Taken Time   /70 08/12/22 1905   Temp 98.1  F (36.7  C) 08/12/22 1905   Pulse 71 08/12/22 1905   Resp 9 08/12/22 1912   SpO2 95 % 08/12/22 1912   Vitals shown include unvalidated device data.    Electronically Signed By: Jay Katz MD  August 12, 2022  8:24 PM

## 2022-08-18 LAB
PATH REPORT.COMMENTS IMP SPEC: NORMAL
PATH REPORT.COMMENTS IMP SPEC: NORMAL
PATH REPORT.FINAL DX SPEC: NORMAL
PATH REPORT.GROSS SPEC: NORMAL
PATH REPORT.INTRAOP OBS SPEC DOC: NORMAL
PATH REPORT.MICROSCOPIC SPEC OTHER STN: NORMAL
PATH REPORT.RELEVANT HX SPEC: NORMAL
PATHOLOGY SYNOPTIC REPORT: NORMAL
PHOTO IMAGE: NORMAL

## 2022-08-23 ENCOUNTER — OFFICE VISIT (OUTPATIENT)
Dept: UROLOGY | Facility: CLINIC | Age: 67
End: 2022-08-23
Payer: COMMERCIAL

## 2022-08-23 VITALS — WEIGHT: 213 LBS | HEIGHT: 73 IN | BODY MASS INDEX: 28.23 KG/M2

## 2022-08-23 DIAGNOSIS — C61 PROSTATE CANCER (H): Primary | ICD-10-CM

## 2022-08-23 DIAGNOSIS — Z90.79 H/O RADICAL PROSTATECTOMY: ICD-10-CM

## 2022-08-23 PROCEDURE — 99024 POSTOP FOLLOW-UP VISIT: CPT | Performed by: STUDENT IN AN ORGANIZED HEALTH CARE EDUCATION/TRAINING PROGRAM

## 2022-08-23 RX ORDER — CIPROFLOXACIN 500 MG/1
500 TABLET, FILM COATED ORAL ONCE
Qty: 1 TABLET | Refills: 0 | Status: CANCELLED | OUTPATIENT
Start: 2022-08-23 | End: 2022-08-23

## 2022-08-23 ASSESSMENT — PAIN SCALES - GENERAL: PAINLEVEL: NO PAIN (0)

## 2022-08-23 NOTE — PROGRESS NOTES
"CHIEF COMPLAINT   Garrett Childers who is a 66 year old male returns today for follow-up of prostate cancer s/p robotic radical prostatectomy and bilateral pelvic lymph node dissection 8/12/2022 with combined robotic left inguinal hernia repair    HPI   Garrett Childers is a 66 year old male returns today for follow-up of prostate cancer s/p robotic radical prostatectomy and bilateral pelvic lymph node dissection 8/12/2022 (iPSA 5.2, Sadie 3+4=7, +BN invasion, + margin @ bladder neck) with combined robotic left inguinal hernia repair    Pain well controlled, doing well from a postop standpoint `    PHYSICAL EXAM  Patient is a 66 year old  male   Vitals: Height 1.854 m (6' 1\"), weight 96.6 kg (213 lb).  Body mass index is 28.1 kg/m .  General Appearance Adult:   Alert, no acute distress, oriented  HENT: throat/mouth:normal, good dentition  Lungs: no respiratory distress, or pursed lip breathing  Heart: No obvious jugular venous distension present  Abdomen: soft, nt, nondistended, some ecchymosis along the periumbilical region, former drain site has small amount of adipose tissue protruding but expect to granulate  Musculoskeltal: extremities normal, no peripheral edema  Skin: no suspicious lesions or rashes  Neuro: Alert, oriented, speech and mentation normal  Psych: affect and mood normal  Gait: Normal  : deferred      surg path 8/12/2022  A.  Soft tissue, peritoneal nodule from posterior bladder surface, excision-  Infarcted fat with chronic inflammation and calcific change, no evidence of malignancy    B.  Soft tissue, left neurovascular bundle margin, excision-  Benign fibrovascular and muscular soft tissue, microscopic foci of benign prostate tissue present, no evidence of malignancy    C.  Prostate, radical prostatectomy-  Adenocarcinoma, [place] grade 3/4, extending to bladder neck resection margin, extraprostatic extension not identified, benign seminal vesicles (see description)    D.  Lymph nodes, bilateral " pelvic, excision-  Benign lymph nodes (2)    E.  Resected hernia sac without evidence of malignancy    ASSESSMENT and PLAN  66 year old male returns today for follow-up of prostate cancer s/p robotic radical prostatectomy and bilateral pelvic lymph node dissection 8/12/2022 (iPSA 5.2, [place] 3+4=7, +BN invasion, + margin @ bladder neck) with combined robotic left inguinal hernia repair.  Orellana removed today    Extensive discussion of pathology results. Demonstrated MSK post prostatectomy nomogram results and the impact that positive margin has on recurrence free survival. Patient asks about adjuvant radiation and I would not pursue at this time for his intermediate risk prostate cancer. Will closely follow PSA for recurrence and consider salvage (or early salvage) radiotherapy if PSA rises.    Discussed penile rehab with daily PDEVI for return of erectile function  Discussed pelvic floor physical therapy referral for return to continence    Continue daily tadalafil 5 mg for penile rehab (has been previously prescribed)  Referral for pelvic floor physical therapy  Return 3 months for next PSA, with JM score    Luke López MD   [facility]  [facility] Phone: [phone]    Over 30 minutes spent on this encounter including time spent with the patient, documentation

## 2022-08-23 NOTE — LETTER
"8/23/2022       RE: Garrett Childers  [address] [address] [address]     Dear Colleague,    Thank you for referring your patient, Garrett Childers, to the [facility] UROLOGY CLINIC [place] at [facility]. Please see a copy of my visit note below.    CHIEF COMPLAINT   Garrett Childers who is a 66 year old male returns today for follow-up of prostate cancer s/p robotic radical prostatectomy and bilateral pelvic lymph node dissection 8/12/2022 with combined robotic left inguinal hernia repair    HPI   Garrett Childers is a 66 year old male returns today for follow-up of prostate cancer s/p robotic radical prostatectomy and bilateral pelvic lymph node dissection 8/12/2022 (iPSA 5.2, [place] 3+4=7, +BN invasion, + margin @ bladder neck) with combined robotic left inguinal hernia repair    Pain well controlled, doing well from a postop standpoint `    PHYSICAL EXAM  Patient is a 66 year old  male   Vitals: Height 1.854 m (6' 1\"), weight 96.6 kg (213 lb).  Body mass index is 28.1 kg/m .  General Appearance Adult:   Alert, no acute distress, oriented  HENT: throat/mouth:normal, good dentition  Lungs: no respiratory distress, or pursed lip breathing  Heart: No obvious jugular venous distension present  Abdomen: soft, nt, nondistended, some ecchymosis along the periumbilical region, former drain site has small amount of adipose tissue protruding but expect to granulate  Musculoskeltal: extremities normal, no peripheral edema  Skin: no suspicious lesions or rashes  Neuro: Alert, oriented, speech and mentation normal  Psych: affect and mood normal  Gait: Normal  : deferred      surg path 8/12/2022  A.  Soft tissue, peritoneal nodule from posterior bladder surface, excision-  Infarcted fat with chronic inflammation and calcific change, no evidence of malignancy    B.  Soft tissue, left neurovascular bundle margin, excision-  Benign fibrovascular and muscular soft " tissue, microscopic foci of benign prostate tissue present, no evidence of malignancy    C.  Prostate, radical prostatectomy-  Adenocarcinoma, [place] grade 3/4, extending to bladder neck resection margin, extraprostatic extension not identified, benign seminal vesicles (see description)    D.  Lymph nodes, bilateral pelvic, excision-  Benign lymph nodes (2)    E.  Resected hernia sac without evidence of malignancy    ASSESSMENT and PLAN  66 year old male returns today for follow-up of prostate cancer s/p robotic radical prostatectomy and bilateral pelvic lymph node dissection 8/12/2022 (iPSA 5.2, [place] 3+4=7, +BN invasion, + margin @ bladder neck) with combined robotic left inguinal hernia repair.  Orellana removed today    Extensive discussion of pathology results. Demonstrated MSK post prostatectomy nomogram results and the impact that positive margin has on recurrence free survival. Patient asks about adjuvant radiation and I would not pursue at this time for his intermediate risk prostate cancer. Will closely follow PSA for recurrence and consider salvage (or early salvage) radiotherapy if PSA rises.    Discussed penile rehab with daily PDEVI for return of erectile function  Discussed pelvic floor physical therapy referral for return to continence    Continue daily tadalafil 5 mg for penile rehab (has been previously prescribed)  Referral for pelvic floor physical therapy  Return 3 months for next PSA, with JM score    Luke López MD   [facility]  [facility] Phone: [phone]    Over 30 minutes spent on this encounter including time spent with the patient, documentation

## 2022-08-23 NOTE — NURSING NOTE
Chief Complaint   Patient presents with     Prostate Cancer     Catheter removal     Patient took 1 tablet Cipro this morning, prior to catheter removal.     Patient's catheter was disconnected from the leg bag and the balloon deflated. The catheter was removed with minimal discomfort experienced by the patient and the procedure was tolerated well.      Patient was given information on Kegel exercises in order to improve continence.    Kenna Vallecillo, EMT

## 2022-09-19 ENCOUNTER — THERAPY VISIT (OUTPATIENT)
Dept: PHYSICAL THERAPY | Facility: CLINIC | Age: 67
End: 2022-09-19
Attending: STUDENT IN AN ORGANIZED HEALTH CARE EDUCATION/TRAINING PROGRAM
Payer: COMMERCIAL

## 2022-09-19 DIAGNOSIS — Z90.79 H/O RADICAL PROSTATECTOMY: ICD-10-CM

## 2022-09-19 PROCEDURE — 97112 NEUROMUSCULAR REEDUCATION: CPT | Mod: GP | Performed by: PHYSICAL THERAPIST

## 2022-09-19 PROCEDURE — 97161 PT EVAL LOW COMPLEX 20 MIN: CPT | Mod: GP | Performed by: PHYSICAL THERAPIST

## 2022-09-19 NOTE — PROGRESS NOTES
Physical Therapy Initial Evaluation  Subjective:        SUBJECTIVE:  Patient is s/p prostatectomy on 8-12-22. Pt also had a right hernia repair on the same day.  Catheter was removed on 8-23-22.  Urination:  Do you feel the sensation of your bladder filling? Yes  How many pads per day are you using? 4 pads 1 Depends  Do you leak on the way to the bathroom or with a strong urge to void? Yes  Do you leak with cough,sneeze, jumping, running? Yes  Do you have triggers that make you feel you can't wait to go to the bathroom? No .  How long can you delay the need to urinate? 3 - 10 minutes.   How many times do you get up to urinate at night? 2x  Can you stop the flow of urine when on the toilet? Yes  Is the volume of urine passed usually: medium. (8sec rule= 250ml with average bladder storing 400-600ml)  Do you strain to pass urine? No  Do you have a slow or hesitant urinary stream? No  Do you have difficulty initiating the urine stream? No  Fluid intake(one glass is 8oz or one cup) 4 glasses/day, 4 caffinated glasses/day  7 alcohol glasses/week.        Patient Health History  Garrett Childers being seen for Post surgery incontinence.     Problem began: 8/12/2022.   Problem occurred: Prostate Surgery   Pain is reported as 1/10 on pain scale.  General health as reported by patient is good.  Pertinent medical history includes: changes in bowel/bladder, cancer and high blood pressure.     Medical allergies: other. Other medical allergies details: Lisinipril.   Surgeries include:  Cancer surgery.    Current medications:  High blood pressure medication.       Primary job tasks include:  Computer work.                  Therapist Generated HPI Evaluation         Type of problem:  Incontinence.    This is a new condition.  Condition occurred with:  After surgery.  Where condition occurred: other.  Site of Pain: right inner thigh.  Pain is described as aching and is intermittent.  Pain is the same all the time.  Since onset symptoms  are gradually improving.  Symptoms are exacerbated by laughing, coughing, sneezing and walking (transfers)  Relieved by: urinating.  Special tests included:  MRI (see chart).  Past treatment: none.   Restrictions due to condition include:  Working in normal job without restrictions.                          Objective:  System                                 Pelvic Dysfunction Evaluation:        Flexibility:  normal      Abdominal Wall:  Abdominal wall pelvic: incisional point tenderness               External Assessment:  External assessment pelvic: scars healing.      Bearing Down/Coughing:  Normal        Internal Assessment:      Contraction/Grade:  Fair squeeze, definite lift (3)  Accessory Muscle use-Abdominals:  Yes  Accessory Muscle use-Gluteals:  Yes  Accessory Muscle use-Adductors:  Yes    SEMG Biofeedback:    Equipment:  Biofeedback      Baseline EMG PM:  3 mV    Peak pelvic muscle contraction:  10 mV    EMG interpretation to fatigue:  3-5 seconds  Additional History:        Caffeine Consumption:  2 cups coffee, 2 cokes per day                     General     ROS    Assessment/Plan:    Patient is a 66 year old male with pelvic complaints.    Patient has the following significant findings with corresponding treatment plan.                Diagnosis 1:  Post op incontinence and hernia repair  Decreased strength - therapeutic exercise, therapeutic activities and home program  Impaired muscle performance - biofeedback, neuro re-education and home program    Therapy Evaluation Codes:   1) History comprised of:   Personal factors that impact the plan of care:      None.    Comorbidity factors that impact the plan of care are:      Bowel/bladder changes, Cancer, High blood pressure and right shoulder replacement.     Medications impacting care: High blood pressure.  2) Examination of Body Systems comprised of:   Body structures and functions that impact the plan of care:      Pelvis.   Activity limitations that  impact the plan of care are:      Urinary incontinence.  3) Clinical presentation characteristics are:   Stable/Uncomplicated.  4) Decision-Making    Low complexity using standardized patient assessment instrument and/or measureable assessment of functional outcome.  Cumulative Therapy Evaluation is: Low complexity.    Previous and current functional limitations:  (See Goal Flow Sheet for this information)    Short term and Long term goals: (See Goal Flow Sheet for this information)     Communication ability:  Patient appears to be able to clearly communicate and understand verbal and written communication and follow directions correctly.  Treatment Explanation - The following has been discussed with the patient:   RX ordered/plan of care  Anticipated outcomes  Possible risks and side effects  This patient would benefit from PT intervention to resume normal activities.   Rehab potential is good.    Frequency:  1 X week, once daily  Duration:  for 6 weeks  Discharge Plan:  Achieve all LTG.  Independent in home treatment program.  Reach maximal therapeutic benefit.    Please refer to the daily flowsheet for treatment today, total treatment time and time spent performing 1:1 timed codes.

## 2022-09-26 ENCOUNTER — THERAPY VISIT (OUTPATIENT)
Dept: PHYSICAL THERAPY | Facility: CLINIC | Age: 67
End: 2022-09-26
Attending: STUDENT IN AN ORGANIZED HEALTH CARE EDUCATION/TRAINING PROGRAM
Payer: COMMERCIAL

## 2022-09-26 DIAGNOSIS — Z90.79 H/O RADICAL PROSTATECTOMY: Primary | ICD-10-CM

## 2022-09-26 PROCEDURE — 97110 THERAPEUTIC EXERCISES: CPT | Mod: GP | Performed by: PHYSICAL THERAPIST

## 2022-09-26 PROCEDURE — 97112 NEUROMUSCULAR REEDUCATION: CPT | Mod: GP | Performed by: PHYSICAL THERAPIST

## 2022-10-03 ENCOUNTER — THERAPY VISIT (OUTPATIENT)
Dept: PHYSICAL THERAPY | Facility: CLINIC | Age: 67
End: 2022-10-03
Attending: STUDENT IN AN ORGANIZED HEALTH CARE EDUCATION/TRAINING PROGRAM
Payer: COMMERCIAL

## 2022-10-03 DIAGNOSIS — Z90.79 H/O RADICAL PROSTATECTOMY: Primary | ICD-10-CM

## 2022-10-03 PROCEDURE — 97112 NEUROMUSCULAR REEDUCATION: CPT | Mod: GP | Performed by: PHYSICAL THERAPIST

## 2022-10-03 PROCEDURE — 97110 THERAPEUTIC EXERCISES: CPT | Mod: GP | Performed by: PHYSICAL THERAPIST

## 2022-10-03 NOTE — PROGRESS NOTES
Subjective:  HPI  Physical Exam                    Objective:  System    Physical Exam    General     ROS    Assessment/Plan:    SUBJECTIVE  Subjective changes as noted by pt:  Pt notes increased strength and decreased incontinence     Current pain level: 0/10     Changes in function:  Pt reports bladder will wake him up to urinate 2x/night so he staying dry overnight.      Adverse reaction to treatment or activity:  None    OBJECTIVE  Changes in objective findings:  Pt is able to maintain a strong Kegel contraction rising from a chair and with ambulation        ASSESSMENT  Garrett continues to require intervention to meet STG and LTG's: PT  Patient's symptoms are resolving.  Response to therapy has shown an improvement in  incontinence  Progress made towards STG/LTG?  Yes,     PLAN  Current treatment program is being advanced to more complex exercises.    PTA/ATC plan:  N/A    Please refer to the daily flowsheet for treatment today, total treatment time and time spent performing 1:1 timed codes.

## 2022-10-10 ENCOUNTER — THERAPY VISIT (OUTPATIENT)
Dept: PHYSICAL THERAPY | Facility: CLINIC | Age: 67
End: 2022-10-10
Payer: COMMERCIAL

## 2022-10-10 DIAGNOSIS — Z90.79 H/O RADICAL PROSTATECTOMY: Primary | ICD-10-CM

## 2022-10-10 PROCEDURE — 97112 NEUROMUSCULAR REEDUCATION: CPT | Mod: GP | Performed by: PHYSICAL THERAPIST

## 2022-10-10 PROCEDURE — 97110 THERAPEUTIC EXERCISES: CPT | Mod: GP | Performed by: PHYSICAL THERAPIST

## 2022-10-24 ENCOUNTER — DOCUMENTATION ONLY (OUTPATIENT)
Dept: LAB | Facility: CLINIC | Age: 67
End: 2022-10-24

## 2022-10-24 ENCOUNTER — THERAPY VISIT (OUTPATIENT)
Dept: PHYSICAL THERAPY | Facility: CLINIC | Age: 67
End: 2022-10-24
Payer: COMMERCIAL

## 2022-10-24 DIAGNOSIS — Z90.79 H/O RADICAL PROSTATECTOMY: Primary | ICD-10-CM

## 2022-10-24 DIAGNOSIS — C61 PROSTATE CANCER (H): Primary | ICD-10-CM

## 2022-10-24 PROCEDURE — 97110 THERAPEUTIC EXERCISES: CPT | Mod: GP | Performed by: PHYSICAL THERAPIST

## 2022-10-24 PROCEDURE — 97112 NEUROMUSCULAR REEDUCATION: CPT | Mod: GP | Performed by: PHYSICAL THERAPIST

## 2022-10-24 NOTE — PROGRESS NOTES
Subjective:  HPI  Physical Exam                    Objective:  System    Physical Exam    General     ROS    Assessment/Plan:    SUBJECTIVE  Subjective changes as noted by pt:  Pt reports increased strength and decreased incontinence     Current pain level: 0/10     Changes in function:  Pt reports little squirts with activity. Pt still notes leaking when playing with his dog and doing dips     Adverse reaction to treatment or activity:  None    OBJECTIVE  Changes in objective findings:  Maximum Kegel contraction while sitting 51mV. Pt shows improved Kegel contractions with activities like rising from a ruel and while doing counter push ups. Pt is using 2 pads per day.        ASSESSMENT  Garrett continues to require intervention to meet STG and LTG's: PT  Patient's symptoms are resolving.  Response to therapy has shown an improvement in  strength and incontinence  Progress made towards STG/LTG?  Yes,     PLAN  Current treatment program is being advanced to more complex exercises.    PTA/ATC plan:  N/A    Please refer to the daily flowsheet for treatment today, total treatment time and time spent performing 1:1 timed codes.

## 2022-11-04 ENCOUNTER — LAB (OUTPATIENT)
Dept: LAB | Facility: CLINIC | Age: 67
End: 2022-11-04
Payer: COMMERCIAL

## 2022-11-04 DIAGNOSIS — C61 PROSTATE CANCER (H): ICD-10-CM

## 2022-11-04 LAB — PSA SERPL-MCNC: <0.01 UG/L (ref 0–4)

## 2022-11-04 PROCEDURE — 84153 ASSAY OF PSA TOTAL: CPT

## 2022-11-04 PROCEDURE — 36415 COLL VENOUS BLD VENIPUNCTURE: CPT

## 2022-11-08 ENCOUNTER — OFFICE VISIT (OUTPATIENT)
Dept: UROLOGY | Facility: CLINIC | Age: 67
End: 2022-11-08
Payer: COMMERCIAL

## 2022-11-08 VITALS
HEART RATE: 64 BPM | SYSTOLIC BLOOD PRESSURE: 141 MMHG | WEIGHT: 213 LBS | DIASTOLIC BLOOD PRESSURE: 81 MMHG | BODY MASS INDEX: 28.23 KG/M2 | HEIGHT: 73 IN

## 2022-11-08 DIAGNOSIS — N52.31 ERECTILE DYSFUNCTION AFTER RADICAL PROSTATECTOMY: ICD-10-CM

## 2022-11-08 DIAGNOSIS — N39.3 SUI (STRESS URINARY INCONTINENCE), MALE: ICD-10-CM

## 2022-11-08 DIAGNOSIS — C61 PROSTATE CANCER (H): Primary | ICD-10-CM

## 2022-11-08 PROCEDURE — 99213 OFFICE O/P EST LOW 20 MIN: CPT | Mod: 24 | Performed by: STUDENT IN AN ORGANIZED HEALTH CARE EDUCATION/TRAINING PROGRAM

## 2022-11-08 RX ORDER — SILDENAFIL 100 MG/1
100 TABLET, FILM COATED ORAL DAILY
Qty: 90 TABLET | Refills: 3 | Status: SHIPPED | OUTPATIENT
Start: 2022-11-08 | End: 2023-08-31

## 2022-11-08 ASSESSMENT — PAIN SCALES - GENERAL: PAINLEVEL: NO PAIN (0)

## 2022-11-08 NOTE — LETTER
"11/8/2022       RE: Garrett Childers  [address] [facility] [address]     Dear Colleague,    Thank you for referring your patient, Garrett Childers, to the [facility] UROLOGY CLINIC [place] at [facility]. Please see a copy of my visit note below.    CHIEF COMPLAINT   Garrett Childers who is a 66 year old male returns today for follow-up of prostate cancer s/p robotic radical prostatectomy and bilateral pelvic lymph node dissection 8/12/2022 (iPSA 5.2, [place] 3+4=7, +BN invasion, + margin @ bladder neck) with combined robotic left inguinal hernia repair.      HPI   Garrett Childers is a 66 year old male who presents with a history of prostate cancer s/p robotic radical prostatectomy and bilateral pelvic lymph node dissection 8/12/2022 (iPSA 5.2, [place] 3+4=7, +BN invasion, + margin @ bladder neck) with combined robotic left inguinal hernia repair.    He was started on daily tadalafil for penile rehab and referred to PFPT    Re: [place], much improved on pelvic floor physical therapy    Is not having any erectile function return on daily tadalafil. JM 1-0-0-0-0.     He denies any issues with the hernia repair    PHYSICAL EXAM  Patient is a 66 year old  male   Vitals: Blood pressure (!) 141/81, pulse 64, height 1.854 m (6' 1\"), weight 96.6 kg (213 lb).  Body mass index is 28.1 kg/m .  General Appearance Adult:   Alert, no acute distress, oriented  HENT: throat/mouth:normal, good dentition  Lungs: no respiratory distress, or pursed lip breathing  Heart: No obvious jugular venous distension present  Abdomen: nondistended. Incisions well healed  Musculoskeltal: extremities normal, no peripheral edema  Skin: no suspicious lesions or rashes  Neuro: Alert, oriented, speech and mentation normal  Psych: affect and mood normal  Gait: Normal  : no inguinal hernia    Component PSA   Latest Ref Rng & Units 0.00 - 4.00 ug/L   6/8/2007 1.0@   9/30/2008 1.32   10/26/2009 " 1.09   11/13/2010 1.28   11/11/2011 1.41   10/26/2012 1.65   12/14/2013 1.81   12/10/2014 2.02   11/20/2015 2.67   12/2/2016 2.92   2/16/2018 3.84   2/22/2019 3.69   3/6/2020 3.42   3/12/2021 3.21   11/4/2022 <0.01       ASSESSMENT and PLAN   66 year old male who presents with a history of prostate cancer s/p robotic radical prostatectomy and bilateral pelvic lymph node dissection 8/12/2022 (iPSA 5.2, [place] 3+4=7, +BN invasion, + margin @ bladder neck) with combined robotic left inguinal hernia repair.    PSA undetectable today, will continue to observe    No return of erectile function yet on daily tadalafil. We will try alternative PDEVI, sildenafil daily    [place] resolving with PFPT    Continue PFPT  Will trial sildenafil 100 mg daily instead of tadalafil 5 mg daily for penile rehab  Return 3 months with PSA, JM score      Luke López MD   [facility]  [facility] Phone: [phone]

## 2022-11-08 NOTE — PROGRESS NOTES
"CHIEF COMPLAINT   Garrett Childers who is a 66 year old male returns today for follow-up of prostate cancer s/p robotic radical prostatectomy and bilateral pelvic lymph node dissection 8/12/2022 (iPSA 5.2, [place] 3+4=7, +BN invasion, + margin @ bladder neck) with combined robotic left inguinal hernia repair.      HPI   Garrett Childers is a 66 year old male who presents with a history of prostate cancer s/p robotic radical prostatectomy and bilateral pelvic lymph node dissection 8/12/2022 (iPSA 5.2, [place] 3+4=7, +BN invasion, + margin @ bladder neck) with combined robotic left inguinal hernia repair.    He was started on daily tadalafil for penile rehab and referred to PFPT    Re: [place], much improved on pelvic floor physical therapy    Is not having any erectile function return on daily tadalafil. JM 1-0-0-0-0.     He denies any issues with the hernia repair    PHYSICAL EXAM  Patient is a 66 year old  male   Vitals: Blood pressure (!) 141/81, pulse 64, height 1.854 m (6' 1\"), weight 96.6 kg (213 lb).  Body mass index is 28.1 kg/m .  General Appearance Adult:   Alert, no acute distress, oriented  HENT: throat/mouth:normal, good dentition  Lungs: no respiratory distress, or pursed lip breathing  Heart: No obvious jugular venous distension present  Abdomen: nondistended. Incisions well healed  Musculoskeltal: extremities normal, no peripheral edema  Skin: no suspicious lesions or rashes  Neuro: Alert, oriented, speech and mentation normal  Psych: affect and mood normal  Gait: Normal  : no inguinal hernia    Component PSA   Latest Ref Rng & Units 0.00 - 4.00 ug/L   6/8/2007 1.0@   9/30/2008 1.32   10/26/2009 1.09   11/13/2010 1.28   11/11/2011 1.41   10/26/2012 1.65   12/14/2013 1.81   12/10/2014 2.02   11/20/2015 2.67   12/2/2016 2.92   2/16/2018 3.84   2/22/2019 3.69   3/6/2020 3.42   3/12/2021 3.21   11/4/2022 <0.01       ASSESSMENT and PLAN   66 year old male who presents with a history of prostate cancer s/p " robotic radical prostatectomy and bilateral pelvic lymph node dissection 8/12/2022 (iPSA 5.2, [place] 3+4=7, +BN invasion, + margin @ bladder neck) with combined robotic left inguinal hernia repair.    PSA undetectable today, will continue to observe    No return of erectile function yet on daily tadalafil. We will try alternative PDEVI, sildenafil daily    [place] resolving with PFPT    Continue PFPT  Will trial sildenafil 100 mg daily instead of tadalafil 5 mg daily for penile rehab  Return 3 months with PSA, JM score      Luke López MD   [facility]  [facility] Phone: [phone]

## 2022-11-10 ENCOUNTER — THERAPY VISIT (OUTPATIENT)
Dept: PHYSICAL THERAPY | Facility: CLINIC | Age: 67
End: 2022-11-10
Payer: COMMERCIAL

## 2022-11-10 DIAGNOSIS — Z90.79 H/O RADICAL PROSTATECTOMY: Primary | ICD-10-CM

## 2022-11-10 PROCEDURE — 97112 NEUROMUSCULAR REEDUCATION: CPT | Mod: GP | Performed by: PHYSICAL THERAPIST

## 2022-11-10 PROCEDURE — 97110 THERAPEUTIC EXERCISES: CPT | Mod: GP | Performed by: PHYSICAL THERAPIST

## 2022-11-10 NOTE — PROGRESS NOTES
Subjective:  HPI  Physical Exam                    Objective:  System    Physical Exam    General     ROS    Assessment/Plan:    DISCHARGE REPORT    Progress reporting period is from 9-19-22 to 11-10-22.       SUBJECTIVE  Subjective changes noted by patient:  Pt notes increased strength and decreased incontinence.       Current pain level is 0/10  .     Previous pain level was  1/10  .   Changes in function:  Pt notes decreased leaking with activity  Adverse reaction to treatment or activity: None    OBJECTIVE  Changes noted in objective findings:  Pt demonstrates a strong Kegel supine, sitting and standing positions. Pt is able to maintain a Kegel with activities like rowing, walking and rising from a chair         ASSESSMENT/PLAN  Updated problem list and treatment plan: Diagnosis 1:  Post op incontinence  Decreased strength - therapeutic exercise, therapeutic activities and home program  Impaired muscle performance - biofeedback, neuro re-education and home program  STG/LTGs have been met or progress has been made towards goals:  Yes,   Assessment of Progress: The patient's condition is improving.  Self Management Plans:  Patient has been instructed in a home treatment program.  I have re-evaluated this patient and find that the nature, scope, duration and intensity of the therapy is appropriate for the medical condition of the patient.  Garrett continues to require the following intervention to meet STG and LTG's:  PT intervention is no longer required to meet STG/LTG.    Recommendations:  This patient is ready to be discharged from therapy and continue their home treatment program.    Please refer to the daily flowsheet for treatment today, total treatment time and time spent performing 1:1 timed codes.

## 2022-11-21 DIAGNOSIS — E78.5 HYPERLIPIDEMIA LDL GOAL <100: ICD-10-CM

## 2022-11-21 RX ORDER — SIMVASTATIN 20 MG
TABLET ORAL
Qty: 90 TABLET | Refills: 1 | Status: SHIPPED | OUTPATIENT
Start: 2022-11-21 | End: 2022-12-13

## 2022-11-22 NOTE — TELEPHONE ENCOUNTER
Pending Prescriptions:                       Disp   Refills    simvastatin (ZOCOR) 20 MG tablet [Pharmac*90 tab*1            Sig: TAKE 1 TABLET BY MOUTH AT  BEDTIME    Prescription approved per [facility] Refill Protocol.

## 2022-12-13 DIAGNOSIS — I10 ESSENTIAL HYPERTENSION WITH GOAL BLOOD PRESSURE LESS THAN 140/90: ICD-10-CM

## 2022-12-13 DIAGNOSIS — E78.5 HYPERLIPIDEMIA LDL GOAL <100: ICD-10-CM

## 2022-12-13 RX ORDER — SIMVASTATIN 20 MG
20 TABLET ORAL AT BEDTIME
Qty: 90 TABLET | Refills: 1 | Status: SHIPPED | OUTPATIENT
Start: 2022-12-13 | End: 2023-04-07

## 2022-12-13 NOTE — TELEPHONE ENCOUNTER
Simvastatin - Prescription approved per [facility] Refill Protocol    Maxzide - Routing refill request to provider for review/approval because:  Failed protocol due to BP

## 2022-12-14 RX ORDER — TRIAMTERENE/HYDROCHLOROTHIAZID 37.5-25 MG
1 TABLET ORAL DAILY
Qty: 90 TABLET | Refills: 1 | Status: SHIPPED | OUTPATIENT
Start: 2022-12-14 | End: 2023-04-07

## 2022-12-14 NOTE — TELEPHONE ENCOUNTER
Please call patient and see if he has a home BP cuff, if so, ask him to send [employer] message or phone message with BP reading and date.

## 2022-12-14 NOTE — TELEPHONE ENCOUNTER
Triamterene hydrochlorothiazide 37.5-25 mg  Routing refill request to provider for review/approval because:  Blood Pressure out of range for [facility] refill protocol.    BP Readings from Last 3 Encounters:   11/08/22 (!) 141/81   08/13/22 (!) 152/85   07/29/22 128/78       LOV 07/29/22

## 2022-12-15 ENCOUNTER — ALLIED HEALTH/NURSE VISIT (OUTPATIENT)
Dept: INTERNAL MEDICINE | Facility: CLINIC | Age: 67
End: 2022-12-15
Payer: MEDICARE

## 2022-12-15 ENCOUNTER — MYC MEDICAL ADVICE (OUTPATIENT)
Dept: INTERNAL MEDICINE | Facility: CLINIC | Age: 67
End: 2022-12-15

## 2022-12-15 VITALS — SYSTOLIC BLOOD PRESSURE: 120 MMHG | DIASTOLIC BLOOD PRESSURE: 72 MMHG

## 2022-12-15 DIAGNOSIS — I10 ESSENTIAL HYPERTENSION WITH GOAL BLOOD PRESSURE LESS THAN 140/90: Primary | ICD-10-CM

## 2022-12-15 PROCEDURE — 99207 PR NO CHARGE NURSE ONLY: CPT

## 2022-12-15 NOTE — PROGRESS NOTES
Patient also sent a [employer] message stating:    Ekaterina Vanegas!   I took my blood pressure at home last night and it was 135 over 73. I came to your office today and the nurse took my blood pressure and it was 120 over 70. Please ler me know if I need to do any thing else.

## 2023-02-06 ENCOUNTER — LAB (OUTPATIENT)
Dept: LAB | Facility: CLINIC | Age: 68
End: 2023-02-06
Payer: COMMERCIAL

## 2023-02-06 DIAGNOSIS — C61 PROSTATE CANCER (H): ICD-10-CM

## 2023-02-06 LAB — PSA SERPL-MCNC: <0.01 NG/ML (ref 0–4.5)

## 2023-02-06 PROCEDURE — 84153 ASSAY OF PSA TOTAL: CPT

## 2023-02-06 PROCEDURE — 36415 COLL VENOUS BLD VENIPUNCTURE: CPT

## 2023-02-10 ENCOUNTER — OFFICE VISIT (OUTPATIENT)
Dept: UROLOGY | Facility: CLINIC | Age: 68
End: 2023-02-10
Payer: COMMERCIAL

## 2023-02-10 VITALS
SYSTOLIC BLOOD PRESSURE: 146 MMHG | WEIGHT: 209 LBS | HEART RATE: 71 BPM | DIASTOLIC BLOOD PRESSURE: 74 MMHG | HEIGHT: 73 IN | BODY MASS INDEX: 27.7 KG/M2

## 2023-02-10 DIAGNOSIS — N39.3 SUI (STRESS URINARY INCONTINENCE), MALE: ICD-10-CM

## 2023-02-10 DIAGNOSIS — C61 PROSTATE CANCER (H): Primary | ICD-10-CM

## 2023-02-10 DIAGNOSIS — N52.31 ERECTILE DYSFUNCTION AFTER RADICAL PROSTATECTOMY: ICD-10-CM

## 2023-02-10 PROCEDURE — 99213 OFFICE O/P EST LOW 20 MIN: CPT | Performed by: STUDENT IN AN ORGANIZED HEALTH CARE EDUCATION/TRAINING PROGRAM

## 2023-02-10 ASSESSMENT — PAIN SCALES - GENERAL: PAINLEVEL: NO PAIN (0)

## 2023-02-10 NOTE — PROGRESS NOTES
"CHIEF COMPLAINT   Garrett Childers who is a 67 year old male returns today for follow-up of prostate cancer s/p robotic radical prostatectomy and bilateral pelvic lymph node dissection 8/12/2022 (iPSA 5.2, [place] 3+4=7, +BN invasion, + margin @ bladder neck) with combined robotic left inguinal hernia repair.  .      HPI   Garrett Childers is a 67 year old male returns today for follow-up of prostate cancer s/p robotic radical prostatectomy and bilateral pelvic lymph node dissection 8/12/2022 (iPSA 5.2, [place] 3+4=7, +BN invasion, + margin @ bladder neck) with combined robotic left inguinal hernia repair.    Re: [place] still having some leakage with lifting heavy weights, also when bladder is very full after drinking water or etOH. Still doing kegels MWF    Re: ED on daily sildenafil, has not had any meaningful erections, only some tumescence.  JM 1    PHYSICAL EXAM  Patient is a 67 year old  male   Vitals: Blood pressure (!) 146/74, pulse 71, height 1.854 m (6' 1\"), weight 94.8 kg (209 lb).  Body mass index is 27.57 kg/m .  General Appearance Adult:   Alert, no acute distress, oriented  HENT: throat/mouth:normal, good dentition  Lungs: no respiratory distress, or pursed lip breathing  Heart: No obvious jugular venous distension present  Abdomen: nondistended  Musculoskeltal: extremities normal, no peripheral edema  Skin: no suspicious lesions or rashes  Neuro: Alert, oriented, speech and mentation normal  Psych: affect and mood normal  Gait: Normal  : deferred    Component      Latest Ref Rng & Units 2/6/2023   PSA Tumor Marker      0.00 - 4.50 ng/mL <0.01       ASSESSMENT and PLAN  67 year old male returns today for follow-up of prostate cancer s/p robotic radical prostatectomy and bilateral pelvic lymph node dissection 8/12/2022 (iPSA 5.2, [place] 3+4=7, +BN invasion, + margin @ bladder neck) with combined robotic left inguinal hernia repair.    PSA remains undetectable, no evidence of prostate cancer recurrence " thus far    Re: [place], minor, one pad a day, continue kegels    Re: ED has not had meaningful erections. He is not interested in ICI. Will have patient try PATRICIA instead and continue on sildenafil daily. Will need to discuss possible penile prosthesis if no significant return of erections and PATRICIA unsatisfactory    - trial vacuum erection device, continue sildenafil daily  - return 3 months with PSA prior      Luke López MD   [facility]rology  [facility] Phone: [phone]

## 2023-02-10 NOTE — LETTER
"2/10/2023       RE: Garrett Childers  [address] [address]     Dear Colleague,    Thank you for referring your patient, Garrett Childers, to the [facility] [place] at [facility]. Please see a copy of my visit note below.    CHIEF COMPLAINT   Garrett Childers who is a 67 year old male returns today for follow-up of prostate cancer s/p robotic radical prostatectomy and bilateral pelvic lymph node dissection 8/12/2022 (iPSA 5.2, Sadie 3+4=7, +BN invasion, + margin @ bladder neck) with combined robotic left inguinal hernia repair.  .      HPI   Garrett Childers is a 67 year old male returns today for follow-up of prostate cancer s/p robotic radical prostatectomy and bilateral pelvic lymph node dissection 8/12/2022 (iPSA 5.2, [place] 3+4=7, +BN invasion, + margin @ bladder neck) with combined robotic left inguinal hernia repair.    Re: [place]FR[place] still having some leakage with lifting heavy weights, also when bladder is very full after drinking water or etOH. Still doing kegels MWF    Re: ED on daily sildenafil, has not had any meaningful erections, only some tumescence.  JM 1    PHYSICAL EXAM  Patient is a 67 year old  male   Vitals: Blood pressure (!) 146/74, pulse 71, height 1.854 m (6' 1\"), weight 94.8 kg (209 lb).  Body mass index is 27.57 kg/m .  General Appearance Adult:   Alert, no acute distress, oriented  HENT: throat/mouth:normal, good dentition  Lungs: no respiratory distress, or pursed lip breathing  Heart: No obvious jugular venous distension present  Abdomen: nondistended  Musculoskeltal: extremities normal, no peripheral edema  Skin: no suspicious lesions or rashes  Neuro: Alert, oriented, speech and mentation normal  Psych: affect and mood normal  Gait: Normal  : deferred    Component      Latest Ref Rng & Units 2/6/2023   PSA Tumor Marker      0.00 - 4.50 ng/mL <0.01       ASSESSMENT and PLAN  67 year old male returns " today for follow-up of prostate cancer s/p robotic radical prostatectomy and bilateral pelvic lymph node dissection 8/12/2022 (iPSA 5.2, Sadie 3+4=7, +BN invasion, + margin @ bladder neck) with combined robotic left inguinal hernia repair.    PSA remains undetectable, no evidence of prostate cancer recurrence thus far    Re: AFRICA, minor, one pad a day, continue kegels    Re: ED has not had meaningful erections. He is not interested in ICI. Will have patient try PATRICIA instead and continue on sildenafil daily. Will need to discuss possible penile prosthesis if no significant return of erections and PATRICIA unsatisfactory    - trial vacuum erection device, continue sildenafil daily  - return 3 months with PSA prior      Luke López MD   [facility] Urology  [facility] Phone: [phone]

## 2023-03-16 ENCOUNTER — TELEPHONE (OUTPATIENT)
Dept: INTERNAL MEDICINE | Facility: CLINIC | Age: 68
End: 2023-03-16
Payer: COMMERCIAL

## 2023-03-16 NOTE — TELEPHONE ENCOUNTER
I tried to contact patient to let her know that Dr. Akers prescribed diclofenac gel- 4 grams topical 4 times per day for the next 2 weeks.  Her PCP is OK with short term use in a patient on anticoagulation medication. There was no answer so I left a message for patient to give our office a call back.    Patient Quality Outreach    Patient is due for the following:   Physical Annual Wellness Visit,  - Due after 3/25/2023    Next Steps:   Patient has upcoming appointment, these items will be addressed at that time. 4/07/2023 appointment with Dr. Vanegas.    Type of outreach:    Chart review performed, no outreach needed.      Questions for provider review:    None     Edda Yan, CMA

## 2023-04-03 ASSESSMENT — ENCOUNTER SYMPTOMS
COUGH: 0
DIARRHEA: 0
PALPITATIONS: 0
WEAKNESS: 0
SORE THROAT: 0
ARTHRALGIAS: 0
HEMATURIA: 0
CHILLS: 0
HEMATOCHEZIA: 0
DIZZINESS: 0
NAUSEA: 0
SHORTNESS OF BREATH: 0
PARESTHESIAS: 0
DYSURIA: 0
CONSTIPATION: 0
FEVER: 0
ABDOMINAL PAIN: 0
JOINT SWELLING: 0
HEADACHES: 0
EYE PAIN: 0
FREQUENCY: 0
HEARTBURN: 0
MYALGIAS: 0
NERVOUS/ANXIOUS: 0

## 2023-04-03 ASSESSMENT — ACTIVITIES OF DAILY LIVING (ADL): CURRENT_FUNCTION: NO ASSISTANCE NEEDED

## 2023-04-07 ENCOUNTER — OFFICE VISIT (OUTPATIENT)
Dept: INTERNAL MEDICINE | Facility: CLINIC | Age: 68
End: 2023-04-07
Payer: COMMERCIAL

## 2023-04-07 VITALS
HEIGHT: 72 IN | BODY MASS INDEX: 28.99 KG/M2 | OXYGEN SATURATION: 94 % | WEIGHT: 214 LBS | DIASTOLIC BLOOD PRESSURE: 70 MMHG | HEART RATE: 68 BPM | SYSTOLIC BLOOD PRESSURE: 126 MMHG | TEMPERATURE: 97.7 F | RESPIRATION RATE: 16 BRPM

## 2023-04-07 DIAGNOSIS — Z00.00 ENCOUNTER FOR MEDICARE ANNUAL WELLNESS EXAM: Primary | ICD-10-CM

## 2023-04-07 DIAGNOSIS — E78.5 HYPERLIPIDEMIA LDL GOAL <100: ICD-10-CM

## 2023-04-07 DIAGNOSIS — N52.31 ERECTILE DYSFUNCTION AFTER RADICAL PROSTATECTOMY: ICD-10-CM

## 2023-04-07 DIAGNOSIS — Z79.899 MEDICATION MANAGEMENT: ICD-10-CM

## 2023-04-07 DIAGNOSIS — I10 ESSENTIAL HYPERTENSION WITH GOAL BLOOD PRESSURE LESS THAN 140/90: ICD-10-CM

## 2023-04-07 DIAGNOSIS — Z23 NEED FOR SHINGLES VACCINE: ICD-10-CM

## 2023-04-07 LAB
ALBUMIN SERPL BCG-MCNC: 4.7 G/DL (ref 3.5–5.2)
ALP SERPL-CCNC: 74 U/L (ref 40–129)
ALT SERPL W P-5'-P-CCNC: 40 U/L (ref 10–50)
ANION GAP SERPL CALCULATED.3IONS-SCNC: 10 MMOL/L (ref 7–15)
AST SERPL W P-5'-P-CCNC: 43 U/L (ref 10–50)
BILIRUB SERPL-MCNC: 0.6 MG/DL
BUN SERPL-MCNC: 18.5 MG/DL (ref 8–23)
CALCIUM SERPL-MCNC: 10.1 MG/DL (ref 8.8–10.2)
CHLORIDE SERPL-SCNC: 102 MMOL/L (ref 98–107)
CHOLEST SERPL-MCNC: 163 MG/DL
CREAT SERPL-MCNC: 0.98 MG/DL (ref 0.67–1.17)
DEPRECATED HCO3 PLAS-SCNC: 27 MMOL/L (ref 22–29)
ERYTHROCYTE [DISTWIDTH] IN BLOOD BY AUTOMATED COUNT: 12.7 % (ref 10–15)
GFR SERPL CREATININE-BSD FRML MDRD: 85 ML/MIN/1.73M2
GLUCOSE SERPL-MCNC: 92 MG/DL (ref 70–99)
HCT VFR BLD AUTO: 47.6 % (ref 40–53)
HDLC SERPL-MCNC: 56 MG/DL
HGB BLD-MCNC: 16.1 G/DL (ref 13.3–17.7)
LDLC SERPL CALC-MCNC: 89 MG/DL
MCH RBC QN AUTO: 31.6 PG (ref 26.5–33)
MCHC RBC AUTO-ENTMCNC: 33.8 G/DL (ref 31.5–36.5)
MCV RBC AUTO: 94 FL (ref 78–100)
NONHDLC SERPL-MCNC: 107 MG/DL
PLATELET # BLD AUTO: 183 10E3/UL (ref 150–450)
POTASSIUM SERPL-SCNC: 5 MMOL/L (ref 3.4–5.3)
PROT SERPL-MCNC: 7.4 G/DL (ref 6.4–8.3)
RBC # BLD AUTO: 5.09 10E6/UL (ref 4.4–5.9)
SODIUM SERPL-SCNC: 139 MMOL/L (ref 136–145)
T4 FREE SERPL-MCNC: 1.48 NG/DL (ref 0.9–1.7)
TRIGL SERPL-MCNC: 92 MG/DL
TSH SERPL DL<=0.005 MIU/L-ACNC: 4.76 UIU/ML (ref 0.3–4.2)
WBC # BLD AUTO: 5.6 10E3/UL (ref 4–11)

## 2023-04-07 PROCEDURE — 84439 ASSAY OF FREE THYROXINE: CPT | Performed by: INTERNAL MEDICINE

## 2023-04-07 PROCEDURE — 80061 LIPID PANEL: CPT | Performed by: INTERNAL MEDICINE

## 2023-04-07 PROCEDURE — 80053 COMPREHEN METABOLIC PANEL: CPT | Performed by: INTERNAL MEDICINE

## 2023-04-07 PROCEDURE — 84443 ASSAY THYROID STIM HORMONE: CPT | Performed by: INTERNAL MEDICINE

## 2023-04-07 PROCEDURE — 85027 COMPLETE CBC AUTOMATED: CPT | Performed by: INTERNAL MEDICINE

## 2023-04-07 PROCEDURE — 90677 PCV20 VACCINE IM: CPT | Performed by: INTERNAL MEDICINE

## 2023-04-07 PROCEDURE — G0439 PPPS, SUBSEQ VISIT: HCPCS | Performed by: INTERNAL MEDICINE

## 2023-04-07 PROCEDURE — 36415 COLL VENOUS BLD VENIPUNCTURE: CPT | Performed by: INTERNAL MEDICINE

## 2023-04-07 PROCEDURE — G0009 ADMIN PNEUMOCOCCAL VACCINE: HCPCS | Performed by: INTERNAL MEDICINE

## 2023-04-07 PROCEDURE — 99214 OFFICE O/P EST MOD 30 MIN: CPT | Mod: 25 | Performed by: INTERNAL MEDICINE

## 2023-04-07 RX ORDER — SILDENAFIL 100 MG/1
100 TABLET, FILM COATED ORAL DAILY
Qty: 90 TABLET | Refills: 3 | Status: CANCELLED | OUTPATIENT
Start: 2023-04-07

## 2023-04-07 RX ORDER — SIMVASTATIN 20 MG
20 TABLET ORAL AT BEDTIME
Qty: 90 TABLET | Refills: 3 | Status: SHIPPED | OUTPATIENT
Start: 2023-04-07 | End: 2024-04-19

## 2023-04-07 RX ORDER — TRIAMTERENE/HYDROCHLOROTHIAZID 37.5-25 MG
1 TABLET ORAL DAILY
Qty: 90 TABLET | Refills: 3 | Status: SHIPPED | OUTPATIENT
Start: 2023-04-07 | End: 2024-04-19

## 2023-04-07 SDOH — ECONOMIC STABILITY: INCOME INSECURITY: IN THE LAST 12 MONTHS, WAS THERE A TIME WHEN YOU WERE NOT ABLE TO PAY THE MORTGAGE OR RENT ON TIME?: NO

## 2023-04-07 SDOH — ECONOMIC STABILITY: FOOD INSECURITY: WITHIN THE PAST 12 MONTHS, THE FOOD YOU BOUGHT JUST DIDN'T LAST AND YOU DIDN'T HAVE MONEY TO GET MORE.: NEVER TRUE

## 2023-04-07 SDOH — ECONOMIC STABILITY: FOOD INSECURITY: WITHIN THE PAST 12 MONTHS, YOU WORRIED THAT YOUR FOOD WOULD RUN OUT BEFORE YOU GOT MONEY TO BUY MORE.: NEVER TRUE

## 2023-04-07 SDOH — HEALTH STABILITY: PHYSICAL HEALTH: ON AVERAGE, HOW MANY MINUTES DO YOU ENGAGE IN EXERCISE AT THIS LEVEL?: 50 MIN

## 2023-04-07 SDOH — HEALTH STABILITY: PHYSICAL HEALTH: ON AVERAGE, HOW MANY DAYS PER WEEK DO YOU ENGAGE IN MODERATE TO STRENUOUS EXERCISE (LIKE A BRISK WALK)?: 5 DAYS

## 2023-04-07 ASSESSMENT — SOCIAL DETERMINANTS OF HEALTH (SDOH)
WITHIN THE LAST YEAR, HAVE YOU BEEN AFRAID OF YOUR PARTNER OR EX-PARTNER?: NO
WITHIN THE LAST YEAR, HAVE YOU BEEN HUMILIATED OR EMOTIONALLY ABUSED IN OTHER WAYS BY YOUR PARTNER OR EX-PARTNER?: NO
WITHIN THE LAST YEAR, HAVE YOU BEEN KICKED, HIT, SLAPPED, OR OTHERWISE PHYSICALLY HURT BY YOUR PARTNER OR EX-PARTNER?: NO
HOW HARD IS IT FOR YOU TO PAY FOR THE VERY BASICS LIKE FOOD, HOUSING, MEDICAL CARE, AND HEATING?: NOT HARD AT ALL
WITHIN THE LAST YEAR, HAVE TO BEEN RAPED OR FORCED TO HAVE ANY KIND OF SEXUAL ACTIVITY BY YOUR PARTNER OR EX-PARTNER?: NO

## 2023-04-07 ASSESSMENT — ENCOUNTER SYMPTOMS
WEAKNESS: 0
HEMATURIA: 0
CONSTIPATION: 0
HEARTBURN: 0
JOINT SWELLING: 0
FREQUENCY: 0
CHILLS: 0
NAUSEA: 0
HEMATOCHEZIA: 0
ABDOMINAL PAIN: 0
PARESTHESIAS: 0
EYE PAIN: 0
NERVOUS/ANXIOUS: 0
DIARRHEA: 0
COUGH: 0
FEVER: 0
HEADACHES: 0
DYSURIA: 0
SHORTNESS OF BREATH: 0
SORE THROAT: 0
MYALGIAS: 0
ARTHRALGIAS: 0
PALPITATIONS: 0
DIZZINESS: 0

## 2023-04-07 ASSESSMENT — LIFESTYLE VARIABLES
HOW OFTEN DO YOU HAVE SIX OR MORE DRINKS ON ONE OCCASION: NEVER
HOW MANY STANDARD DRINKS CONTAINING ALCOHOL DO YOU HAVE ON A TYPICAL DAY: 1 OR 2
AUDIT-C TOTAL SCORE: 4
HOW OFTEN DO YOU HAVE A DRINK CONTAINING ALCOHOL: 4 OR MORE TIMES A WEEK
SKIP TO QUESTIONS 9-10: 1

## 2023-04-07 ASSESSMENT — ACTIVITIES OF DAILY LIVING (ADL): CURRENT_FUNCTION: NO ASSISTANCE NEEDED

## 2023-04-07 NOTE — NURSING NOTE
Prior to immunization administration, verified patients identity using patient s name and date of birth. Please see Immunization Activity for additional information.     Screening Questionnaire for Adult Immunization    Are you sick today?   No   Do you have allergies to medications, food, a vaccine component or latex?   No   Have you ever had a serious reaction after receiving a vaccination?   No   Do you have a long-term health problem with heart, lung, kidney, or metabolic disease (e.g., diabetes), asthma, a blood disorder, no spleen, complement component deficiency, a cochlear implant, or a spinal fluid leak?  Are you on long-term aspirin therapy?   No   Do you have cancer, leukemia, HIV/AIDS, or any other immune system problem?   No   Do you have a parent, brother, or sister with an immune system problem?   No   In the past 3 months, have you taken medications that affect  your immune system, such as prednisone, other steroids, or anticancer drugs; drugs for the treatment of rheumatoid arthritis, Crohn s disease, or psoriasis; or have you had radiation treatments?   No   Have you had a seizure, or a brain or other nervous system problem?   No   During the past year, have you received a transfusion of blood or blood    products, or been given immune (gamma) globulin or antiviral drug?   No   For women: Are you pregnant or is there a chance you could become       pregnant during the next month?   No   Have you received any vaccinations in the past 4 weeks?   No     Immunization questionnaire answers were all negative.      Injection of Pneumococcal 20 given by Edda Yan CMA. Patient instructed to remain in clinic for 15 minutes afterwards, and to report any adverse reactions.     Screening performed by Edda Yan CMA on 4/7/2023 at 9:03 AM.

## 2023-04-07 NOTE — PROGRESS NOTES
"SUBJECTIVE:   Kaleb is a 67 year old who presents for Preventive Visit.      4/7/2023     8:12 AM   Additional Questions   Roomed by Edda CARLTON CMA     Patient has been advised of split billing requirements and indicates understanding: Yes  Are you in the first 12 months of your Medicare coverage?  No    Healthy Habits:     In general, how would you rate your overall health?  Excellent    Frequency of exercise:  4-5 days/week    Duration of exercise:  45-60 minutes    Do you usually eat at least 4 servings of fruit and vegetables a day, include whole grains    & fiber and avoid regularly eating high fat or \"junk\" foods?  Yes    Taking medications regularly:  No    Barriers to taking medications:  None    Medication side effects:  Not applicable    Ability to successfully perform activities of daily living:  No assistance needed    Home Safety:  No safety concerns identified    Hearing Impairment:  Difficulty following a conversation in a noisy restaurant or crowded room    In the past 6 months, have you been bothered by leaking of urine? Yes    In general, how would you rate your overall mental or emotional health?  Excellent      PHQ-2 Total Score: 0    Additional concerns today:  No           Have you ever done Advance Care Planning? (For example, a Health Directive, POLST, or a discussion with a medical provider or your loved ones about your wishes): Yes, patient states has an Advance Care Planning document and will bring a copy to the clinic.       Fall risk  Fallen 2 or more times in the past year?: No  Any fall with injury in the past year?: No    Cognitive Screening   1) Repeat 3 items (Leader, Season, Table)    2) Clock draw: NORMAL  3) 3 item recall: Recalls 3 objects  Results: 3 items recalled: COGNITIVE IMPAIRMENT LESS LIKELY    Mini-CogTM Copyright DICK Ely. Licensed by the author for use in [employer]; reprinted with permission (johnnie@.[facility]). All rights reserved.      Do you have sleep apnea, " excessive snoring or daytime drowsiness?: no    Reviewed and updated as needed this visit by clinical staff   Tobacco  Allergies  Meds              Reviewed and updated as needed this visit by Provider                 Social History     Tobacco Use     Smoking status: Never     Passive exposure: Yes     Smokeless tobacco: Former     Quit date: 1/1/2005     Tobacco comments:     cigar, rarely   Vaping Use     Vaping status: Never Used   Substance Use Topics     Alcohol use: Yes     Comment: <6 drinks/week. May have 2 beers or one mixed drinks on a given day             4/3/2023     3:27 PM   Alcohol Use   Prescreen: >3 drinks/day or >7 drinks/week? No     Do you have a current opioid prescription? No  Do you use any other controlled substances or medications that are not prescribed by a provider? None        PROBLEMS TO ADD ON......In addition to an Annual Wellness Exam, we addressed hypertension, hyperlipidemia, erectile dysfunction and frequency/slowing of urinary stream.     BP appears satisfactorily controlled on current meds.     Reviewed his 10-year risk of stroke/heart attack over 15 percent a year ago, and after discussion he was open to taking simvastatin.     Tadalafil did seem to work to some degree for urinary symptoms, but he is no longer taking Oroxatrol or tadalafil.   He has an Rx already of file for sildenafil for erectile dysfunction.         Current providers sharing in care for this patient include:   Patient Care Team:  Bong Vanegas MD as PCP - General (Internal Medicine)  Bong Vanegas MD as Assigned PCP  Luke López MD as MD (Urology)  Luke López MD as Assigned Surgical Provider    The following health maintenance items are reviewed in Epic and correct as of today:  Health Maintenance   Topic Date Due     ZOSTER IMMUNIZATION (2 of 2) 08/05/2020     AORTIC ANEURYSM SCREENING (SYSTEM ASSIGNED)  Never done     ANNUAL REVIEW OF HM ORDERS  03/25/2023     MEDICARE ANNUAL  "WELLNESS VISIT  03/25/2023     Pneumococcal Vaccine: 65+ Years (2 - PCV) 03/25/2023     FALL RISK ASSESSMENT  04/07/2024     ADVANCE CARE PLANNING  03/25/2027     LIPID  06/01/2027     COLORECTAL CANCER SCREENING  12/04/2027     DTAP/TDAP/TD IMMUNIZATION (2 - Td or Tdap) 02/16/2028     HEPATITIS C SCREENING  Completed     PHQ-2 (once per calendar year)  Completed     INFLUENZA VACCINE  Completed     COVID-19 Vaccine  Completed     IPV IMMUNIZATION  Aged Out     MENINGITIS IMMUNIZATION  Aged Out       Review of Systems   Constitutional: Negative for chills and fever.   HENT: Negative for congestion, ear pain, hearing loss and sore throat.    Eyes: Negative for pain and visual disturbance.   Respiratory: Negative for cough and shortness of breath.    Cardiovascular: Negative for chest pain, palpitations and peripheral edema.   Gastrointestinal: Negative for abdominal pain, constipation, diarrhea, heartburn, hematochezia and nausea.   Genitourinary: Positive for impotence. Negative for dysuria, frequency, genital sores, hematuria, penile discharge and urgency.   Musculoskeletal: Negative for arthralgias, joint swelling and myalgias.   Skin: Negative for rash.   Neurological: Negative for dizziness, weakness, headaches and paresthesias.   Psychiatric/Behavioral: Negative for mood changes. The patient is not nervous/anxious.        OBJECTIVE:   /70 (BP Location: Left arm, Patient Position: Sitting, Cuff Size: Adult Large)   Pulse 68   Temp 97.7  F (36.5  C) (Tympanic)   Resp 16   Ht 1.82 m (5' 11.65\")   Wt 97.1 kg (214 lb)   SpO2 94%   BMI 29.30 kg/m   Estimated body mass index is 29.3 kg/m  as calculated from the following:    Height as of this encounter: 1.82 m (5' 11.65\").    Weight as of this encounter: 97.1 kg (214 lb).     Physical Exam  GENERAL: healthy, alert and no distress  EYES: Eyes grossly normal to inspection, PERRL and conjunctivae and sclerae normal  HENT: ear canals and TM's normal, nose and " "mouth without ulcers or lesions  NECK: no adenopathy, no asymmetry, masses, or scars and thyroid normal to palpation  RESP: lungs clear to auscultation - no rales, rhonchi or wheezes  CV: regular rate and rhythm, normal S1 S2, no S3 or S4, no murmur, click or rub, no peripheral edema and peripheral pulses strong  ABDOMEN: soft, nontender, no hepatosplenomegaly, no masses and bowel sounds normal  MS: no gross musculoskeletal defects noted, no edema  SKIN: no suspicious lesions or rashes  NEURO: Normal strength and tone, mentation intact and speech normal  PSYCH: mentation appears normal, affect normal/bright    Diagnostic Test Results:  Labs reviewed in Epic    ASSESSMENT / PLAN:   (Z00.00) Encounter for Medicare annual wellness exam  (primary encounter diagnosis)  Comment: Stable health. See epic orders.   Plan: PRIMARY CARE FOLLOW-UP SCHEDULING          (I10) Essential hypertension with goal blood pressure less than 140/90  Comment: BP at target. Continue current meds.   Plan: triamterene-HCTZ (MAXZIDE-25) 37.5-25 MG         tablet, OFFICE/OUTPT VISIT,EST,LEVL III          (E78.5) Hyperlipidemia LDL goal <100  Comment: Update lipids. Continue current meds.   Plan: simvastatin (ZOCOR) 20 MG tablet, Lipid panel         reflex to direct LDL Fasting, **Comprehensive         metabolic panel FUTURE 2mo, OFFICE/OUTPT         VISIT,EST,LEVL III          (N52.31) Erectile dysfunction after radical prostatectomy  Comment: Continue sildenafil.     (Z79.895) Medication management  Plan: **TSH with free T4 reflex FUTURE 2mo, **CBC         with platelets FUTURE 2mo, T4 free            Patient has been advised of split billing requirements and indicates understanding: Yes      COUNSELING:  Reviewed preventive health counseling, as reflected in patient instructions       BMI:   Estimated body mass index is 29.3 kg/m  as calculated from the following:    Height as of this encounter: 1.82 m (5' 11.65\").    Weight as of this " encounter: 97.1 kg (214 lb).         He reports that he has never smoked. He has been exposed to tobacco smoke. He quit smokeless tobacco use about 18 years ago.      Appropriate preventive services were discussed with this patient, including applicable screening as appropriate for cardiovascular disease, diabetes, osteopenia/osteoporosis, and glaucoma.  As appropriate for age/gender, discussed screening for colorectal cancer, prostate cancer, breast cancer, and cervical cancer. Checklist reviewing preventive services available has been given to the patient.    Reviewed patients plan of care and provided an AVS. The Basic Care Plan (routine screening as documented in Health Maintenance) for Garrett meets the Care Plan requirement. This Care Plan has been established and reviewed with the Patient.          Bong Vanegas MD,   [facility]          The patient was provided with written information regarding signs of hearing loss.

## 2023-04-07 NOTE — PATIENT INSTRUCTIONS
Patient Education   Personalized Prevention Plan  You are due for the preventive services outlined below.  Your care team is available to assist you in scheduling these services.  If you have already completed any of these items, please share that information with your care team to update in your medical record.  Health Maintenance Due   Topic Date Due    Zoster (Shingles) Vaccine (2 of 2) 08/05/2020    AORTIC ANEURYSM SCREENING (SYSTEM ASSIGNED)  Never done    Pneumococcal Vaccine (2 - PCV) 03/25/2023       Signs of Hearing Loss  Hearing loss is a problem shared by many people. In fact, it's one of the most common health problems, particularly as people age. Most people aged 65 and older have some hearing loss. By age 80, almost everyone does. Hearing loss often occurs slowly over the years. So, you may not realize your hearing has gotten worse.   When sudden hearing loss occurs, it's important to contact your healthcare provider right away. Your provider will do a medical exam and a hearing exam as soon as possible. This is to help find the cause and type of your sudden hearing loss. Based on your diagnosis, your healthcare provider will discuss possible treatments.      Hearing much better with one ear can be a sign of hearing loss.     Have your hearing checked  Call your healthcare provider if you:   Have to strain to hear normal conversation  Have to watch other people s faces very carefully to follow what they re saying  Need to ask people to repeat what they ve said  Often misunderstand what people are saying  Turn the volume of the television or radio up so high that others complain  Feel that people are mumbling when they re talking to you  Find that the effort to hear leaves you feeling tired and irritated  Notice, when using the phone, that you hear better with one ear than the other  [employer] last reviewed this educational content on 6/1/2022 2000-2022 [employer]. All rights  reserved. This information is not intended as a substitute for professional medical care. Always follow your healthcare professional's instructions.      Everything looks fine!    Refills of medication have been faxed to your pharmacy.     Lab results will be available soon on [employer].    See me in a year, sooner if problems.

## 2023-05-12 ENCOUNTER — LAB (OUTPATIENT)
Dept: LAB | Facility: CLINIC | Age: 68
End: 2023-05-12
Payer: COMMERCIAL

## 2023-05-12 DIAGNOSIS — C61 PROSTATE CANCER (H): ICD-10-CM

## 2023-05-12 LAB — PSA SERPL DL<=0.01 NG/ML-MCNC: <0.01 NG/ML (ref 0–4.5)

## 2023-05-12 PROCEDURE — 84153 ASSAY OF PSA TOTAL: CPT

## 2023-05-12 PROCEDURE — 36415 COLL VENOUS BLD VENIPUNCTURE: CPT

## 2023-05-16 ENCOUNTER — OFFICE VISIT (OUTPATIENT)
Dept: UROLOGY | Facility: CLINIC | Age: 68
End: 2023-05-16
Payer: COMMERCIAL

## 2023-05-16 VITALS
HEIGHT: 73 IN | DIASTOLIC BLOOD PRESSURE: 76 MMHG | BODY MASS INDEX: 27.83 KG/M2 | WEIGHT: 210 LBS | SYSTOLIC BLOOD PRESSURE: 118 MMHG

## 2023-05-16 DIAGNOSIS — N52.31 ERECTILE DYSFUNCTION AFTER RADICAL PROSTATECTOMY: ICD-10-CM

## 2023-05-16 DIAGNOSIS — N39.3 SUI (STRESS URINARY INCONTINENCE), MALE: ICD-10-CM

## 2023-05-16 DIAGNOSIS — C61 PROSTATE CANCER (H): Primary | ICD-10-CM

## 2023-05-16 PROCEDURE — 99213 OFFICE O/P EST LOW 20 MIN: CPT | Performed by: STUDENT IN AN ORGANIZED HEALTH CARE EDUCATION/TRAINING PROGRAM

## 2023-05-16 ASSESSMENT — PAIN SCALES - GENERAL: PAINLEVEL: NO PAIN (0)

## 2023-05-16 NOTE — NURSING NOTE
Chief Complaint   Patient presents with     Prostate Cancer     3 months with PSA     Pt states he has some urine leakage with sexual stimulation or lifting. Pt states this is not enough to wear pads at home, but will wear one outside of the house.    Hilda Wilson, CMA

## 2023-05-16 NOTE — PROGRESS NOTES
"CHIEF COMPLAINT   Garrett Childers who is a 67 year old male returns today for follow-up of prostate cancer s/p robotic radical prostatectomy and bilateral pelvic lymph node dissection 8/12/2022 (iPSA 5.2, Sadie 3+4=7, +BN invasion, + margin @ bladder neck) with combined robotic left inguinal hernia repair      HPI   Garrett Childers is a 67 year old male returns today for follow-up of prostate cancer s/p robotic radical prostatectomy and bilateral pelvic lymph node dissection 8/12/2022 (iPSA 5.2, [place]ie 3+4=7, +BN invasion, + margin @ bladder neck) with combined robotic left inguinal hernia repair, ED, [place]    With any type of sexual stimulation he has a small amount of urine leakage. Also still has some leakage with lifting heavy objects and after drinking EtOH. He is doing Kegels 3x a week.    Erectile function is not working at this point. PATRICIA has some tumescence but not turgid enough for entry.    PHYSICAL EXAM  Patient is a 67 year old  male   Vitals: Blood pressure 118/76, height 1.842 m (6' 0.5\"), weight 95.3 kg (210 lb).  Body mass index is 28.09 kg/m .  General Appearance Adult:   Alert, no acute distress, oriented  HENT: throat/mouth:normal, good dentition  Lungs: no respiratory distress, or pursed lip breathing  Heart: No obvious jugular venous distension present  Abdomen: nondistended  Musculoskeltal: extremities normal, no peripheral edema  Skin: no suspicious lesions or rashes  Neuro: Alert, oriented, speech and mentation normal  Psych: affect and mood normal  Gait: Normal       PSA PSA Tumor Marker   Latest Ref Rng 0.00 - 4.00 ug/L 0.00 - 4.50 ng/mL   6/8/2007  12:00 AM 1.0@ (E)    9/30/2008  11:39 AM 1.32     10/26/2009  8:54 AM 1.09     11/13/2010  8:37 AM 1.28     11/11/2011  8:32 AM 1.41     10/26/2012  8:56 AM 1.65     12/14/2013  10:23 AM 1.81     12/10/2014  8:36 AM 2.02     11/20/2015  11:29 AM 2.67     12/2/2016  9:44 AM 2.92     2/16/2018  9:13 AM 3.84     2/22/2019  9:21 AM 3.69   "   3/6/2020  9:22 AM 3.42     3/12/2021  9:30 AM 3.21     11/4/2022  7:59 AM <0.01     2/6/2023  8:12 AM  <0.01    5/12/2023  7:58 AM  <0.01       Legend:  (E) External lab result      ASSESSMENT and PLAN   67 year old male returns today for follow-up of prostate cancer s/p robotic radical prostatectomy and bilateral pelvic lymph node dissection 8/12/2022 (iPSA 5.2, Sadie 3+4=7, +BN invasion, + margin @ bladder neck) with combined robotic left inguinal hernia repair, ED, [place]    Prostate cancer: BONNY, PSA remains undetectable     AFRICA: slowly improving. Will send him back to PFPT to see if this improves things further    ED: PATRICIA and daily sildenafil not working. He declines ICI at this time, doubtful that he would want to do injections in the future. Briefly broached the subject of inflatable penile prosthesis if no further recovery.      Luke López MD   [facility]  [facility] Phone: [phone]

## 2023-05-16 NOTE — LETTER
"5/16/2023       RE: Garrett Childers  [address] [address]     Dear Colleague,    Thank you for referring your patient, Garrett Childers, to the [facility]OLOG[facility] [place] at [facility]. Please see a copy of my visit note below.    CHIEF COMPLAINT   Garrett Childers who is a 67 year old male returns today for follow-up of prostate cancer s/p robotic radical prostatectomy and bilateral pelvic lymph node dissection 8/12/2022 (iPSA 5.2, Sadie 3+4=7, +BN invasion, + margin @ bladder neck) with combined robotic left inguinal hernia repair      HPI   Garrett Childers is a 67 year old male returns today for follow-up of prostate cancer s/p robotic radical prostatectomy and bilateral pelvic lymph node dissection 8/12/2022 (iPSA 5.2, Sadie 3+4=7, +BN invasion, + margin @ bladder neck) with combined robotic left inguinal hernia repair, ED, [place]    With any type of sexual stimulation he has a small amount of urine leakage. Also still has some leakage with lifting heavy objects and after drinking EtOH. He is doing Kegels 3x a week.    Erectile function is not working at this point. PATRICIA has some tumescence but not turgid enough for entry.    PHYSICAL EXAM  Patient is a 67 year old  male   Vitals: Blood pressure 118/76, height 1.842 m (6' 0.5\"), weight 95.3 kg (210 lb).  Body mass index is 28.09 kg/m .  General Appearance Adult:   Alert, no acute distress, oriented  HENT: throat/mouth:normal, good dentition  Lungs: no respiratory distress, or pursed lip breathing  Heart: No obvious jugular venous distension present  Abdomen: nondistended  Musculoskeltal: extremities normal, no peripheral edema  Skin: no suspicious lesions or rashes  Neuro: Alert, oriented, speech and mentation normal  Psych: affect and mood normal  Gait: Normal       PSA PSA Tumor Marker   Latest Ref Rng 0.00 - 4.00 ug/L 0.00 - 4.50 ng/mL   6/8/2007  12:00 AM 1.0@ (E)  "   9/30/2008  11:39 AM 1.32     10/26/2009  8:54 AM 1.09     11/13/2010  8:37 AM 1.28     11/11/2011  8:32 AM 1.41     10/26/2012  8:56 AM 1.65     12/14/2013  10:23 AM 1.81     12/10/2014  8:36 AM 2.02     11/20/2015  11:29 AM 2.67     12/2/2016  9:44 AM 2.92     2/16/2018  9:13 AM 3.84     2/22/2019  9:21 AM 3.69     3/6/2020  9:22 AM 3.42     3/12/2021  9:30 AM 3.21     11/4/2022  7:59 AM <0.01     2/6/2023  8:12 AM  <0.01    5/12/2023  7:58 AM  <0.01       Legend:  (E) External lab result      ASSESSMENT and PLAN   67 year old male returns today for follow-up of prostate cancer s/p robotic radical prostatectomy and bilateral pelvic lymph node dissection 8/12/2022 (iPSA 5.2, Sadie 3+4=7, +BN invasion, + margin @ bladder neck) with combined robotic left inguinal hernia repair, ED, [place]    Prostate cancer: BONNY, PSA remains undetectable     AFRICA: slowly improving. Will send him back to PFPT to see if this improves things further    ED: PATRICIA and daily sildenafil not working. He declines ICI at this time, doubtful that he would want to do injections in the future. Briefly broached the subject of inflatable penile prosthesis if no further recovery.      Luke López MD   [facility]rology  [facility] Phone: [phone]

## 2023-05-22 ENCOUNTER — THERAPY VISIT (OUTPATIENT)
Dept: PHYSICAL THERAPY | Facility: CLINIC | Age: 68
End: 2023-05-22
Payer: COMMERCIAL

## 2023-05-22 DIAGNOSIS — N39.3 SUI (STRESS URINARY INCONTINENCE), MALE: ICD-10-CM

## 2023-05-22 DIAGNOSIS — N39.46 MIXED INCONTINENCE URGE AND STRESS (MALE)(FEMALE): ICD-10-CM

## 2023-05-22 DIAGNOSIS — C61 PROSTATE CANCER (H): ICD-10-CM

## 2023-05-22 PROCEDURE — 90912 BFB TRAINING 1ST 15 MIN: CPT | Mod: GP | Performed by: PHYSICAL THERAPIST

## 2023-05-22 PROCEDURE — 90913 BFB TRAINING EA ADDL 15 MIN: CPT | Mod: GP | Performed by: PHYSICAL THERAPIST

## 2023-05-22 PROCEDURE — 99207 PR STAT PHYS THERAPY EVALUATION LOW - IAM: CPT | Performed by: PHYSICAL THERAPIST

## 2023-05-22 NOTE — PROGRESS NOTES
PHYSICAL THERAPY EVALUATION  Type of Visit: Evaluation    See electronic medical record for Abuse and Falls Screening details.    Subjective      Presenting condition or subjective complaint: Occasional incontinance  Date of onset: 08/12/22    Relevant medical history:     Dates & types of surgery:      Prior diagnostic imaging/testing results: MRI     Prior therapy history for the same diagnosis, illness or injury: Yes Mid-Late 2022    Prior Level of Function   Transfers: Independent  Ambulation: Independent  ADL: Independent  IADL: Yard work    Living Environment  Social support: With a significant other or spouse   Type of home: House   Stairs to enter the home: Yes 3 Is there a railing: Yes   Ramp: No   Stairs inside the home: Yes 13 Is there a railing: Yes   Help at home: None  Equipment owned:       Employment: No    Hobbies/Interests:      Patient goals for therapy: no pads    Pain assessment: Pain denied     Objective      PELVIC EVALUATION  ADDITIONAL HISTORY:  Sex assigned at birth: Male  Gender identity: Male    Pronouns: He/Him/His      Bladder History:  Feels bladder filling: Yes  Triggers for feeling of inability to wait to go to the bathroom: No    How long can you wait to urinate: 15 min  Gets up at night to urinate: Yes 2  Can stop the flow of urine when urinating: Yes  Volume of urine usually released: Medium   Other issues:    Number of bladder infections in last 12 months:    Fluid intake per day: 16 30 16  Medications taken for bladder: No     Activities causing urine leak:      Amount of urine typically leaked: drops  Pads used to help with leaking: Yes I use this many pads per day: 1      Bowel History:  Frequency of bowel movement: 1  Consistency of stool: Soft-formed    Ignores the urge to defecate: Sometimes  Other bowel issues:    Length of time spent trying to have a bowel movement:      Sexual Function History:  Sexual orientation: Straight    Sexually active: Yes  Lubrication used: No  No  Pelvic pain:      Pain or difficulty with orgasms/erection/ejaculation:      State of menopause:    Hormone medications: No      Discussed reason for referral regarding pelvic health needs and external/internal pelvic floor muscle examination with patient/guardian.  Opportunity provided to ask questions and verbal consent for assessment and intervention was given.    PAIN: Pain Level at Rest: 0/10  POSTURE: WNL  LUMBAR SCREEN: AROM WNL  HIP SCREEN:  Strength: WNL   Functional Strength Testing: WNL    PELVIC/SI SCREEN:  WNL   PAIN PROVOCATION TEST:   PELVIS/SI SPECIAL TESTS:   BREATHING SYMMETRY: WNL    PELVIC EXAM  External Visual Inspection:      Integumentary:       External Digital Palpation per Perineum:       Scar:   Location/Type:   Mobility:     Internal Digital Palpation:  Per Vagina:      Per Rectum:        Pelvic Organ Prolapse:       ABDOMINAL ASSESSMENT  Diastasis Rectus Abdominis (AFTAB):      Abdominal Activation/Strength: WNL    Scar:   Location/Type:   Mobility:     Fascial Tension/Restriction/Tone:     BIOFEEDBACK:  Position: Supine  Surface Electrodes: Perineal    Abdominals: Synergistic with pelvic floor    Perianals:   Baseline muscle activity: 10 microvolts    DERMATOMES:   DTR S:     Assessment & Plan   CLINICAL IMPRESSIONS   Medical Diagnosis: post op incontinence    Treatment Diagnosis: urinary leaking   Impression/Assessment: Patient is a 67 year old male with post op incontinence  complaints.  The following significant findings have been identified: Impaired muscle performance. These impairments interfere with their ability to perform self care tasks as compared to previous level of function.     Clinical Decision Making (Complexity):   Clinical Presentation: Stable/Uncomplicated  Clinical Presentation Rationale: based on medic  al and personal factors listed in PT evaluation  Clinical Decision Making (Complexity): Low complexity    PLAN OF CARE  Treatment Interventions:  Modalities:  Biofeedback  Interventions: Neuromuscular Re-education    Long Term Goals     PT Goal 1  Goal Identifier: pelvic  Goal Description: pt able to prevent incontinence after drinking 2 beers  Rationale: to maximize safety and independence with self cares;to maximize safety and independence within the home;to maximize safety and independence with performance of ADLs and functional tasks  Target Date: 07/26/23      Frequency of Treatment:    Duration of Treatment:      Recommended Referrals to Other Professionals: Physical Therapy  Education Assessment:        Risks and benefits of evaluation/treatment have been explained.   Patient/Family/caregiver agrees with Plan of Care.     Evaluation Time:            Signing Clinician: TIMOTHY Carr [facility]                                                                                   OUTPATIENT PHYSICAL THERAPY      PLAN OF TREATMENT FOR OUTPATIENT REHABILITATION   Patient's Last Name, First Name, Garrett Parish  DAYRON YOB: 1955   Provider's Name   [facility]   Medical Record No.  [id number]     Onset Date: 08/12/22  Start of Care Date:       Medical Diagnosis:  post op incontinence      PT Treatment Diagnosis:  urinary leaking Plan of Treatment  Frequency/Duration:  /      Certification date from   to           See note for plan of treatment details and functional goals     Raad Merlos, PT                         I CERTIFY THE NEED FOR THESE SERVICES FURNISHED UNDER        THIS PLAN OF TREATMENT AND WHILE UNDER MY CARE     (Physician attestation of this document indicates review and certification of the therapy plan).                  Referring Provider:  Luke López      Initial Assessment  See Epic Evaluation-

## 2023-05-23 PROBLEM — N39.46 MIXED INCONTINENCE URGE AND STRESS (MALE)(FEMALE): Status: ACTIVE | Noted: 2023-05-23

## 2023-06-01 ENCOUNTER — THERAPY VISIT (OUTPATIENT)
Dept: PHYSICAL THERAPY | Facility: CLINIC | Age: 68
End: 2023-06-01
Payer: COMMERCIAL

## 2023-06-01 DIAGNOSIS — N39.3 FEMALE STRESS INCONTINENCE: Primary | ICD-10-CM

## 2023-06-01 DIAGNOSIS — C61 PROSTATE CANCER (H): ICD-10-CM

## 2023-06-01 DIAGNOSIS — N39.3 SUI (STRESS URINARY INCONTINENCE), MALE: ICD-10-CM

## 2023-06-01 DIAGNOSIS — N39.46 MIXED INCONTINENCE URGE AND STRESS (MALE)(FEMALE): ICD-10-CM

## 2023-06-01 PROCEDURE — 90913 BFB TRAINING EA ADDL 15 MIN: CPT | Mod: GP | Performed by: PHYSICAL THERAPIST

## 2023-06-01 PROCEDURE — 90912 BFB TRAINING 1ST 15 MIN: CPT | Mod: GP | Performed by: PHYSICAL THERAPIST

## 2023-06-09 NOTE — PROGRESS NOTES
[facility]                                                                                   OUTPATIENT PHYSICAL THERAPY    PLAN OF TREATMENT FOR OUTPATIENT REHABILITATION   Patient's Last Name, First Name, Garrett Parish YOB: 1955   Provider's Name   [facility]   Medical Record No.  [id number]     Onset Date: 08/12/22  Start of Care Date: 05/22/23     Medical Diagnosis:  post op incontinence      PT Treatment Diagnosis:  urinary leaking Plan of Treatment  Frequency/Duration: 2x/month/ 3 months    Certification date from 05/22/23 to 08/19/23         See note for plan of treatment details and functional goals     Raad Merlos, PT                         I CERTIFY THE NEED FOR THESE SERVICES FURNISHED UNDER        THIS PLAN OF TREATMENT AND WHILE UNDER MY CARE     (Physician attestation of this document indicates review and certification of the therapy plan).                Referring Provider:  Luke López      Initial Assessment  See Epic Evaluation- Start of Care Date: 05/22/23

## 2023-06-15 ENCOUNTER — THERAPY VISIT (OUTPATIENT)
Dept: PHYSICAL THERAPY | Facility: CLINIC | Age: 68
End: 2023-06-15
Payer: COMMERCIAL

## 2023-06-15 DIAGNOSIS — C61 PROSTATE CANCER (H): ICD-10-CM

## 2023-06-15 DIAGNOSIS — N39.3 FEMALE STRESS INCONTINENCE: Primary | ICD-10-CM

## 2023-06-15 DIAGNOSIS — N39.3 SUI (STRESS URINARY INCONTINENCE), MALE: ICD-10-CM

## 2023-06-15 DIAGNOSIS — N39.46 MIXED INCONTINENCE URGE AND STRESS (MALE)(FEMALE): ICD-10-CM

## 2023-06-15 PROCEDURE — 90913 BFB TRAINING EA ADDL 15 MIN: CPT | Mod: GP | Performed by: PHYSICAL THERAPIST

## 2023-06-15 PROCEDURE — 90912 BFB TRAINING 1ST 15 MIN: CPT | Mod: GP | Performed by: PHYSICAL THERAPIST

## 2023-06-15 NOTE — PROGRESS NOTES
DISCHARGE  Reason for Discharge: pt progressing and feels comfortable with HEP>     Equipment Issued: none    Discharge Plan: Patient to continue home program.    Referring Provider:  Luke López

## 2023-08-29 ENCOUNTER — LAB (OUTPATIENT)
Dept: LAB | Facility: CLINIC | Age: 68
End: 2023-08-29
Payer: COMMERCIAL

## 2023-08-29 DIAGNOSIS — C61 PROSTATE CANCER (H): ICD-10-CM

## 2023-08-29 LAB — PSA SERPL DL<=0.01 NG/ML-MCNC: <0.01 NG/ML (ref 0–4.5)

## 2023-08-29 PROCEDURE — 84153 ASSAY OF PSA TOTAL: CPT

## 2023-08-29 PROCEDURE — 36415 COLL VENOUS BLD VENIPUNCTURE: CPT

## 2023-08-31 ENCOUNTER — OFFICE VISIT (OUTPATIENT)
Dept: UROLOGY | Facility: CLINIC | Age: 68
End: 2023-08-31
Payer: COMMERCIAL

## 2023-08-31 VITALS
HEIGHT: 72 IN | BODY MASS INDEX: 28.71 KG/M2 | SYSTOLIC BLOOD PRESSURE: 138 MMHG | WEIGHT: 212 LBS | DIASTOLIC BLOOD PRESSURE: 86 MMHG

## 2023-08-31 DIAGNOSIS — C61 PROSTATE CANCER (H): ICD-10-CM

## 2023-08-31 DIAGNOSIS — N52.31 ERECTILE DYSFUNCTION AFTER RADICAL PROSTATECTOMY: ICD-10-CM

## 2023-08-31 DIAGNOSIS — Z90.79 H/O RADICAL PROSTATECTOMY: ICD-10-CM

## 2023-08-31 DIAGNOSIS — N39.3 SUI (STRESS URINARY INCONTINENCE), MALE: Primary | ICD-10-CM

## 2023-08-31 PROCEDURE — 99213 OFFICE O/P EST LOW 20 MIN: CPT | Performed by: STUDENT IN AN ORGANIZED HEALTH CARE EDUCATION/TRAINING PROGRAM

## 2023-08-31 RX ORDER — SILDENAFIL 100 MG/1
100 TABLET, FILM COATED ORAL DAILY
Qty: 90 TABLET | Refills: 3 | Status: SHIPPED | OUTPATIENT
Start: 2023-08-31 | End: 2024-08-27

## 2023-08-31 ASSESSMENT — PAIN SCALES - GENERAL: PAINLEVEL: NO PAIN (0)

## 2023-08-31 NOTE — LETTER
8/31/2023       RE: Garrett Childers  [id number] [address] [facility] [address]     Dear Colleague,    Thank you for referring your patient, Garrett Childers, to the [facility] [place] at [facility]. Please see a copy of my visit note below.    CHIEF COMPLAINT   Garrett Childers who is a 67 year old male returns today for follow-up of prostate cancer s/p robotic radical prostatectomy and bilateral pelvic lymph node dissection 8/12/2022 (iPSA 5.2, Sadie 3+4=7, +BN invasion, + margin @ bladder neck) with combined robotic left inguinal hernia repair.      HPI   Garrett Childers is a 67 year old male who presents with a history of 67 year old male returns today for follow-up of prostate cancer s/p robotic radical prostatectomy and bilateral pelvic lymph node dissection 8/12/2022 (iPSA 5.2, [place] 3+4=7, +BN invasion, + margin @ bladder neck) with combined robotic left inguinal hernia repair    Leakage doing better with pfpt    Having some tumescence, not turgid enough for penetration, on sildenafil    Is having climacturia    PHYSICAL EXAM  Patient is a 67 year old  male   Vitals: Blood pressure 138/86, height 1.829 m (6'), weight 96.2 kg (212 lb).  Body mass index is 28.75 kg/m .  General Appearance Adult:   Alert, no acute distress, oriented  HENT: throat/mouth:normal, good dentition  Lungs: no respiratory distress, or pursed lip breathing  Heart: No obvious jugular venous distension present  Abdomen: nondistended  Musculoskeltal: extremities normal, no peripheral edema  Skin: no suspicious lesions or rashes  Neuro: Alert, oriented, speech and mentation normal  Psych: affect and mood normal  Gait: Normal  : deferred     PSA PSA Tumor Marker   Latest Ref Rng 0.00 - 4.00 ug/L 0.00 - 4.50 ng/mL   6/8/2007  12:00 AM 1.0@ (E)    9/30/2008  11:39 AM 1.32     10/26/2009  8:54 AM 1.09     11/13/2010  8:37 AM 1.28     11/11/2011  8:32 AM 1.41      10/26/2012  8:56 AM 1.65     12/14/2013  10:23 AM 1.81     12/10/2014  8:36 AM 2.02     11/20/2015  11:29 AM 2.67     12/2/2016  9:44 AM 2.92     2/16/2018  9:13 AM 3.84     2/22/2019  9:21 AM 3.69     3/6/2020  9:22 AM 3.42     3/12/2021  9:30 AM 3.21     11/4/2022  7:59 AM <0.01     2/6/2023  8:12 AM  <0.01    5/12/2023  7:58 AM  <0.01    8/29/2023  7:27 AM  <0.01       Legend:  (E) External lab result    ASSESSMENT and PLAN  67 year old male who presents with a history of 67 year old male returns today for follow-up of prostate cancer s/p robotic radical prostatectomy and bilateral pelvic lymph node dissection 8/12/2022 (iPSA 5.2, [place] 3+4=7, +BN invasion, + margin @ bladder neck) with combined robotic left inguinal hernia repair      Re: ED, he still wants to think about ICI. Wants  to continue viagra for now. Having climacturia, hopefully this will improve with pelvic floor physical therapy for [place]  Re: [place], doing better on PFPT    Re: prostate cancer, PSA remains undetectable, no evidence of recurrence. Will continue to follow closely    - refill sildenafil  - 4 months with psa prior    Luke López MD   [facility]  [facility] Phone: [phone]

## 2023-08-31 NOTE — NURSING NOTE
Chief Complaint   Patient presents with    Prostate Cancer     Pt has no new urinary concerns today.  Physical therapy has been improving symptoms.      Kenna Vallecillo, EMT

## 2023-08-31 NOTE — PROGRESS NOTES
CHIEF COMPLAINT   Garrett Childers who is a 67 year old male returns today for follow-up of prostate cancer s/p robotic radical prostatectomy and bilateral pelvic lymph node dissection 8/12/2022 (iPSA 5.2, [place] 3+4=7, +BN invasion, + margin @ bladder neck) with combined robotic left inguinal hernia repair.      HPI   Garrett Childers is a 67 year old male who presents with a history of 67 year old male returns today for follow-up of prostate cancer s/p robotic radical prostatectomy and bilateral pelvic lymph node dissection 8/12/2022 (iPSA 5.2, [place] 3+4=7, +BN invasion, + margin @ bladder neck) with combined robotic left inguinal hernia repair    Leakage doing better with pfpt    Having some tumescence, not turgid enough for penetration, on sildenafil    Is having climacturia    PHYSICAL EXAM  Patient is a 67 year old  male   Vitals: Blood pressure 138/86, height 1.829 m (6'), weight 96.2 kg (212 lb).  Body mass index is 28.75 kg/m .  General Appearance Adult:   Alert, no acute distress, oriented  HENT: throat/mouth:normal, good dentition  Lungs: no respiratory distress, or pursed lip breathing  Heart: No obvious jugular venous distension present  Abdomen: nondistended  Musculoskeltal: extremities normal, no peripheral edema  Skin: no suspicious lesions or rashes  Neuro: Alert, oriented, speech and mentation normal  Psych: affect and mood normal  Gait: Normal  : deferred     PSA PSA Tumor Marker   Latest Ref Rng 0.00 - 4.00 ug/L 0.00 - 4.50 ng/mL   6/8/2007  12:00 AM 1.0@ (E)    9/30/2008  11:39 AM 1.32     10/26/2009  8:54 AM 1.09     11/13/2010  8:37 AM 1.28     11/11/2011  8:32 AM 1.41     10/26/2012  8:56 AM 1.65     12/14/2013  10:23 AM 1.81     12/10/2014  8:36 AM 2.02     11/20/2015  11:29 AM 2.67     12/2/2016  9:44 AM 2.92     2/16/2018  9:13 AM 3.84     2/22/2019  9:21 AM 3.69     3/6/2020  9:22 AM 3.42     3/12/2021  9:30 AM 3.21     11/4/2022  7:59 AM <0.01     2/6/2023  8:12 AM  <0.01     5/12/2023  7:58 AM  <0.01    8/29/2023  7:27 AM  <0.01       Legend:  (E) External lab result    ASSESSMENT and PLAN  67 year old male who presents with a history of 67 year old male returns today for follow-up of prostate cancer s/p robotic radical prostatectomy and bilateral pelvic lymph node dissection 8/12/2022 (iPSA 5.2, [place] 3+4=7, +BN invasion, + margin @ bladder neck) with combined robotic left inguinal hernia repair      Re: ED, he still wants to think about ICI. Wants  to continue viagra for now. Having climacturia, hopefully this will improve with pelvic floor physical therapy for [place]  Re: [place], doing better on PFPT    Re: prostate cancer, PSA remains undetectable, no evidence of recurrence. Will continue to follow closely    - refill sildenafil  - 4 months with psa prior    Luke López MD   [facility]  [facility] Phone: [phone]

## 2023-12-18 ENCOUNTER — DOCUMENTATION ONLY (OUTPATIENT)
Dept: LAB | Facility: CLINIC | Age: 68
End: 2023-12-18
Payer: COMMERCIAL

## 2023-12-18 DIAGNOSIS — C61 PROSTATE CANCER (H): Primary | ICD-10-CM

## 2023-12-18 NOTE — PROGRESS NOTES
Garrett Childers has an upcoming lab appointment:    Future Appointments   Date Time Provider Department Center   12/20/2023  7:15 AM [facility]   12/22/2023  9:00 AM [facility]   4/19/2024  8:30 AM Bong Vanegas MD [facility]     Patient is scheduled for the following lab(s): PSA    There is no order available. Please review and place either future orders or HMPO (Review of Health Maintenance Protocol Orders), as appropriate.    There are no preventive care reminders to display for this patient.  Jorge A Leiva

## 2023-12-20 ENCOUNTER — LAB (OUTPATIENT)
Dept: LAB | Facility: CLINIC | Age: 68
End: 2023-12-20
Payer: COMMERCIAL

## 2023-12-20 DIAGNOSIS — C61 PROSTATE CANCER (H): ICD-10-CM

## 2023-12-20 LAB — PSA SERPL DL<=0.01 NG/ML-MCNC: <0.01 NG/ML (ref 0–4.5)

## 2023-12-20 PROCEDURE — 84153 ASSAY OF PSA TOTAL: CPT

## 2023-12-20 PROCEDURE — 36415 COLL VENOUS BLD VENIPUNCTURE: CPT

## 2023-12-22 ENCOUNTER — OFFICE VISIT (OUTPATIENT)
Dept: UROLOGY | Facility: CLINIC | Age: 68
End: 2023-12-22
Payer: COMMERCIAL

## 2023-12-22 VITALS
WEIGHT: 218 LBS | DIASTOLIC BLOOD PRESSURE: 72 MMHG | HEART RATE: 56 BPM | HEIGHT: 72 IN | SYSTOLIC BLOOD PRESSURE: 133 MMHG | BODY MASS INDEX: 29.53 KG/M2

## 2023-12-22 DIAGNOSIS — C61 PROSTATE CANCER (H): ICD-10-CM

## 2023-12-22 DIAGNOSIS — N39.3 SUI (STRESS URINARY INCONTINENCE), MALE: Primary | ICD-10-CM

## 2023-12-22 DIAGNOSIS — N52.31 ERECTILE DYSFUNCTION AFTER RADICAL PROSTATECTOMY: ICD-10-CM

## 2023-12-22 DIAGNOSIS — F52.32 MALE ORGASMIC DISORDER: ICD-10-CM

## 2023-12-22 PROCEDURE — 99213 OFFICE O/P EST LOW 20 MIN: CPT | Performed by: STUDENT IN AN ORGANIZED HEALTH CARE EDUCATION/TRAINING PROGRAM

## 2023-12-22 ASSESSMENT — PAIN SCALES - GENERAL: PAINLEVEL: NO PAIN (0)

## 2023-12-22 NOTE — PROGRESS NOTES
CHIEF COMPLAINT   Garrett Childers who is a 68 year old male returns today for follow-up of prostate cancer s/p robotic radical prostatectomy and bilateral pelvic lymph node dissection 8/12/2022 (iPSA 5.2, [place]ie 3+4=7, +BN invasion, + margin @ bladder neck) with combined robotic left inguinal hernia repair.      HPI   Garrett Childers is a 68 year old male returns today for follow-up of prostate cancer s/p robotic radical prostatectomy and bilateral pelvic lymph node dissection 8/12/2022 (iPSA 5.2, [place] 3+4=7, +BN invasion, + margin @ bladder neck) with combined robotic left inguinal hernia repair    His day to day stress urinary incontinence is minor and manageable but his bigger issue is leakage with sexual arousal or especially with orgasm and this is bothersome    Re: ED, has some tumescence but doesn't get turgid enough for penetration    PHYSICAL EXAM  Patient is a 68 year old  male   Vitals: Blood pressure 133/72, pulse 56, height 1.829 m (6'), weight 98.9 kg (218 lb).  Body mass index is 29.57 kg/m .  General Appearance Adult:   Alert, no acute distress, oriented  HENT: throat/mouth:normal, good dentition  Lungs: no respiratory distress, or pursed lip breathing  Heart: No obvious jugular venous distension present  Abdomen: nondistended  Musculoskeltal: extremities normal, no peripheral edema  Skin: no suspicious lesions or rashes  Neuro: Alert, oriented, speech and mentation normal  Psych: affect and mood normal      Component      Latest Ref Rng 12/20/2023  7:14 AM   PSA Tumor Marker      0.00 - 4.50 ng/mL <0.01        ASSESSMENT and PLAN  68 year old male returns today for follow-up of prostate cancer s/p robotic radical prostatectomy and bilateral pelvic lymph node dissection 8/12/2022 (iPSA 5.2, [place] 3+4=7, +BN invasion, + margin @ bladder neck) with combined robotic left inguinal hernia repair, with minor stress urinary incontinence but more bothersome climacturia and leakage with arousal. I  recommend he see a reconstructive urologist to consider possible artifical urinary sphincter placement vs. Male sling. Re: ED, he wants to continue sildenafil for now, we again discussed intracavernosal injections and he is strongly considering it. We also discussed inflatable penile prosthesis and he is considering that instead of ICI    Re: prostate  cancer, PSA remains undetectable    - referral to  reconstructive urologist for consideration of AUS or male sling +/- IPP  - return 4 months with PSA prior    Luke López MD   [facility]  [facility] Phone: [phone]

## 2023-12-22 NOTE — LETTER
12/22/2023       RE: Garrett Childers  [id number] [address] [address]     Dear Colleague,    Thank you for referring your patient, Garrett Childers, to the [facility]OLOGY [facility] [place] at [facility]. Please see a copy of my visit note below.    CHIEF COMPLAINT   Garrett Childers who is a 68 year old male returns today for follow-up of prostate cancer s/p robotic radical prostatectomy and bilateral pelvic lymph node dissection 8/12/2022 (iPSA 5.2, [place]ie 3+4=7, +BN invasion, + margin @ bladder neck) with combined robotic left inguinal hernia repair.      HPI   Garrett Childers is a 68 year old male returns today for follow-up of prostate cancer s/p robotic radical prostatectomy and bilateral pelvic lymph node dissection 8/12/2022 (iPSA 5.2, [place]ie 3+4=7, +BN invasion, + margin @ bladder neck) with combined robotic left inguinal hernia repair    His day to day stress urinary incontinence is minor and manageable but his bigger issue is leakage with sexual arousal or especially with orgasm and this is bothersome    Re: ED, has some tumescence but doesn't get turgid enough for penetration    PHYSICAL EXAM  Patient is a 68 year old  male   Vitals: Blood pressure 133/72, pulse 56, height 1.829 m (6'), weight 98.9 kg (218 lb).  Body mass index is 29.57 kg/m .  General Appearance Adult:   Alert, no acute distress, oriented  HENT: throat/mouth:normal, good dentition  Lungs: no respiratory distress, or pursed lip breathing  Heart: No obvious jugular venous distension present  Abdomen: nondistended  Musculoskeltal: extremities normal, no peripheral edema  Skin: no suspicious lesions or rashes  Neuro: Alert, oriented, speech and mentation normal  Psych: affect and mood normal      Component      Latest Ref Rng 12/20/2023  7:14 AM   PSA Tumor Marker      0.00 - 4.50 ng/mL <0.01        ASSESSMENT and PLAN  68 year old male returns today for follow-up of prostate  cancer s/p robotic radical prostatectomy and bilateral pelvic lymph node dissection 8/12/2022 (iPSA 5.2, [place] 3+4=7, +BN invasion, + margin @ bladder neck) with combined robotic left inguinal hernia repair, with minor stress urinary incontinence but more bothersome climacturia and leakage with arousal. I recommend he see a reconstructive urologist to consider possible artifical urinary sphincter placement vs. Male sling. Re: ED, he wants to continue sildenafil for now, we again discussed intracavernosal injections and he is strongly considering it. We also discussed inflatable penile prosthesis and he is considering that instead of ICI    Re: prostate  cancer, PSA remains undetectable    - referral to  reconstructive urologist for consideration of AUS or male sling +/- IPP  - return 4 months with PSA prior    Luke López MD   [facility]  [facility] Phone: [phone]

## 2023-12-22 NOTE — NURSING NOTE
Chief Complaint   Patient presents with    Prostate Cancer     Review PSA     No changes in urination since last visit, per pt.    Kenna Vallecillo, EMT

## 2024-01-05 DIAGNOSIS — N52.31 ERECTILE DYSFUNCTION AFTER RADICAL PROSTATECTOMY: Primary | ICD-10-CM

## 2024-01-09 ENCOUNTER — TELEPHONE (OUTPATIENT)
Dept: UROLOGY | Facility: CLINIC | Age: 69
End: 2024-01-09
Payer: COMMERCIAL

## 2024-01-09 NOTE — TELEPHONE ENCOUNTER
[facility] Call Center    Phone Message    May a detailed message be left on voicemail: yes     Reason for Call: Other: Patient calls to schedule TriMix teaching. Sending TE per protocol. Patient is not available today 11:30-1:30. Patient states that pharmacy is waiting for patient to schedule before they prepare medication. Patient prefers to schedule in [place] if that is an option. Patient also asked about coverage of the medication. Patient states that insurance will cover the needles and alcohol wipes but not the medication, is there anything that can be done to try to get medication covered?     Action Taken: Message routed to:  Other: Urology    Travel Screening: Not Applicable

## 2024-01-09 NOTE — TELEPHONE ENCOUNTER
"PA team is unable to initiate PA until the compound is \"built\" at  [facility].      This is still in process at this time.  Will check back tomorrow to see if the NDC's, ingredients and quantities are available.  PA team needs the pharmacy to process the compounded drug through patient's insurance so the insurance can see the claim.  Unable to initiate PA until that is done.       "

## 2024-01-09 NOTE — TELEPHONE ENCOUNTER
Prior Authorization Retail Medication Request    Medication/Dose: TriMix #9  Diagnosis and ICD code (if different than what is on RX):    New/renewal/insurance change PA/secondary ins. PA:  Previously Tried and Failed:  Sildenafil  Rationale:  Sildenafil is ineffective enough for penetration     Insurance   Primary: See Chart  Insurance ID:  See Chart    Secondary (if applicable):  Insurance ID:      Pharmacy Information (if different than what is on RX)  Name:    Phone:    Fax:

## 2024-01-09 NOTE — TELEPHONE ENCOUNTER
Spoke with patient, answered his questions regarding TriMix.  Will send PA for TriMix #9.  Once we receive a repsonse, then patient will schedule TriMix teaching with me.    Sirisha Thomas, RN, BSN  Care Coordinator  [facility]

## 2024-01-10 NOTE — TELEPHONE ENCOUNTER
[phone]    Inquired about cancellation status for the drug.     Per technician patient has Medicare Part D which does not cover medications used to aid in sexual dysfunction.  They are not going to send this for a PA request because it is excluded per medicare law. This medication will not be covered under insurance.     Will route back to clinic with this information. If patient would like to pay out of pocket he may per pharmacy.

## 2024-01-10 NOTE — TELEPHONE ENCOUNTER
Informed patient of information below.  Patient would like to proceed with filling TriMix rx and pay out of pocket.  Informed patient to call the  [facility] pharmacy to proceed.  Made nurse appointment for TriMix teaching on 1/17/24.  Instructed patient to bring his medication and syringes to appointment.  Patient states understanding.    Sirisha Thomas RN, BSN  Care Coordinator  [facility]

## 2024-01-17 ENCOUNTER — ALLIED HEALTH/NURSE VISIT (OUTPATIENT)
Dept: UROLOGY | Facility: CLINIC | Age: 69
End: 2024-01-17
Payer: COMMERCIAL

## 2024-01-17 DIAGNOSIS — N52.31 ERECTILE DYSFUNCTION AFTER RADICAL PROSTATECTOMY: Primary | ICD-10-CM

## 2024-01-17 PROCEDURE — 96372 THER/PROPH/DIAG INJ SC/IM: CPT

## 2024-01-17 NOTE — PROGRESS NOTES
TriMix Teaching:     Garrett Childers comes into clinic today at the request of Dr. López Ordering Provider for Med Injection only TriMix #9 .    Patient presents to clinic for TriMix injection teaching.  TriMix informational patient packet was read and reviewed in clinic by patient.  Reviewed usage and possible side effects.  Patient educated on alternating sites for injection, left and right side of shaft. Also discussed not to use more than 4 times per week, at least 24 hours between each injection.    Also discussed prolonged erections and need to go to ER if that happens. He would need to go into the ER in 4 hours if his erection is prolonged.   Questions answered appropriately. Discussed there needs to be some element of foreplay after injecting TriMix.   This reaches it's full effectiveness in 20 minutes.   Patient verbalized understanding.    Patient injected 10 units of his own prescription of TriMix into the cavernosa with out assistance. Patient then sent home to use erection.    1 Day Following Injection to Discuss Response via Telephone:    Patient rates his erection was a 8/10. Patient stated that it was firm enough for penetration.  Recommended patient to continue with 10 units at next injection.  Patient states understanding.    This service provided today was under the supervising provider of the day Dr. López, who was available if needed.      Sirisha Thomas, RN, BSN  [facility]  [phone]

## 2024-04-02 ENCOUNTER — DOCUMENTATION ONLY (OUTPATIENT)
Dept: LAB | Facility: CLINIC | Age: 69
End: 2024-04-02
Payer: COMMERCIAL

## 2024-04-02 DIAGNOSIS — C61 PROSTATE CANCER (H): Primary | ICD-10-CM

## 2024-04-02 NOTE — PROGRESS NOTES
Garrett Childers has an upcoming lab appointment:    Future Appointments   Date Time Provider Department Center   4/19/2024  8:30 AM Bong Vanegas MD [facility]   4/23/2024  7:15 AM [facility]   4/25/2024  9:15 AM Luke López MD [facility]     Patient is scheduled for the following lab(s): PSA    There is no order available. Please review and place either future orders or HMPO (Review of Health Maintenance Protocol Orders), as appropriate.    Health Maintenance Due   Topic    LIPID     ANNUAL REVIEW OF HM ORDERS      Radha Oneill

## 2024-04-12 SDOH — HEALTH STABILITY: PHYSICAL HEALTH: ON AVERAGE, HOW MANY DAYS PER WEEK DO YOU ENGAGE IN MODERATE TO STRENUOUS EXERCISE (LIKE A BRISK WALK)?: 6 DAYS

## 2024-04-12 SDOH — HEALTH STABILITY: PHYSICAL HEALTH: ON AVERAGE, HOW MANY MINUTES DO YOU ENGAGE IN EXERCISE AT THIS LEVEL?: 50 MIN

## 2024-04-12 ASSESSMENT — SOCIAL DETERMINANTS OF HEALTH (SDOH): HOW OFTEN DO YOU GET TOGETHER WITH FRIENDS OR RELATIVES?: THREE TIMES A WEEK

## 2024-04-19 ENCOUNTER — OFFICE VISIT (OUTPATIENT)
Dept: INTERNAL MEDICINE | Facility: CLINIC | Age: 69
End: 2024-04-19
Attending: INTERNAL MEDICINE
Payer: COMMERCIAL

## 2024-04-19 VITALS
SYSTOLIC BLOOD PRESSURE: 130 MMHG | HEART RATE: 61 BPM | RESPIRATION RATE: 18 BRPM | OXYGEN SATURATION: 94 % | DIASTOLIC BLOOD PRESSURE: 81 MMHG | WEIGHT: 227 LBS | BODY MASS INDEX: 30.75 KG/M2 | TEMPERATURE: 97.7 F | HEIGHT: 72 IN

## 2024-04-19 DIAGNOSIS — I10 ESSENTIAL HYPERTENSION WITH GOAL BLOOD PRESSURE LESS THAN 140/90: ICD-10-CM

## 2024-04-19 DIAGNOSIS — C61 PROSTATE CANCER (H): ICD-10-CM

## 2024-04-19 DIAGNOSIS — N52.31 ERECTILE DYSFUNCTION AFTER RADICAL PROSTATECTOMY: ICD-10-CM

## 2024-04-19 DIAGNOSIS — H91.93 BILATERAL HEARING LOSS, UNSPECIFIED HEARING LOSS TYPE: ICD-10-CM

## 2024-04-19 DIAGNOSIS — Z79.899 MEDICATION MANAGEMENT: ICD-10-CM

## 2024-04-19 DIAGNOSIS — Z00.00 ENCOUNTER FOR MEDICARE ANNUAL WELLNESS EXAM: Primary | ICD-10-CM

## 2024-04-19 DIAGNOSIS — E78.5 HYPERLIPIDEMIA LDL GOAL <100: ICD-10-CM

## 2024-04-19 LAB
ERYTHROCYTE [DISTWIDTH] IN BLOOD BY AUTOMATED COUNT: 13 % (ref 10–15)
HCT VFR BLD AUTO: 48.6 % (ref 40–53)
HGB BLD-MCNC: 16.3 G/DL (ref 13.3–17.7)
MCH RBC QN AUTO: 31.3 PG (ref 26.5–33)
MCHC RBC AUTO-ENTMCNC: 33.5 G/DL (ref 31.5–36.5)
MCV RBC AUTO: 94 FL (ref 78–100)
PLATELET # BLD AUTO: 183 10E3/UL (ref 150–450)
RBC # BLD AUTO: 5.2 10E6/UL (ref 4.4–5.9)
WBC # BLD AUTO: 5.1 10E3/UL (ref 4–11)

## 2024-04-19 PROCEDURE — 85027 COMPLETE CBC AUTOMATED: CPT | Performed by: INTERNAL MEDICINE

## 2024-04-19 PROCEDURE — 84153 ASSAY OF PSA TOTAL: CPT | Performed by: INTERNAL MEDICINE

## 2024-04-19 PROCEDURE — G0439 PPPS, SUBSEQ VISIT: HCPCS | Performed by: INTERNAL MEDICINE

## 2024-04-19 PROCEDURE — 84443 ASSAY THYROID STIM HORMONE: CPT | Performed by: INTERNAL MEDICINE

## 2024-04-19 PROCEDURE — 80053 COMPREHEN METABOLIC PANEL: CPT | Performed by: INTERNAL MEDICINE

## 2024-04-19 PROCEDURE — 36415 COLL VENOUS BLD VENIPUNCTURE: CPT | Performed by: INTERNAL MEDICINE

## 2024-04-19 PROCEDURE — 80061 LIPID PANEL: CPT | Performed by: INTERNAL MEDICINE

## 2024-04-19 PROCEDURE — 84439 ASSAY OF FREE THYROXINE: CPT | Performed by: INTERNAL MEDICINE

## 2024-04-19 PROCEDURE — 99214 OFFICE O/P EST MOD 30 MIN: CPT | Mod: 25 | Performed by: INTERNAL MEDICINE

## 2024-04-19 RX ORDER — SIMVASTATIN 20 MG
20 TABLET ORAL AT BEDTIME
Qty: 90 TABLET | Refills: 3 | Status: SHIPPED | OUTPATIENT
Start: 2024-04-19

## 2024-04-19 RX ORDER — TRIAMTERENE/HYDROCHLOROTHIAZID 37.5-25 MG
1 TABLET ORAL DAILY
Qty: 90 TABLET | Refills: 3 | Status: SHIPPED | OUTPATIENT
Start: 2024-04-19

## 2024-04-19 ASSESSMENT — LIFESTYLE VARIABLES
AUDIT-C TOTAL SCORE: 4
HOW MANY STANDARD DRINKS CONTAINING ALCOHOL DO YOU HAVE ON A TYPICAL DAY: 1 OR 2
HOW OFTEN DO YOU HAVE SIX OR MORE DRINKS ON ONE OCCASION: NEVER
HOW OFTEN DO YOU HAVE A DRINK CONTAINING ALCOHOL: 4 OR MORE TIMES A WEEK
SKIP TO QUESTIONS 9-10: 1

## 2024-04-19 NOTE — PATIENT INSTRUCTIONS
Preventive Care Advice   This is general advice given by our system to help you stay healthy. However, your care team may have specific advice just for you. Please talk to your care team about your preventive care needs.  Nutrition  Eat 5 or more servings of fruits and vegetables each day.  Try wheat bread, brown rice and whole grain pasta (instead of white bread, rice, and pasta).  Get enough calcium and vitamin D. Check the label on foods and aim for 100% of the RDA (recommended daily allowance).  Lifestyle  Exercise at least 150 minutes each week   (30 minutes a day, 5 days a week).  Do muscle strengthening activities 2 days a week. These help control your weight and prevent disease.  No smoking.  Wear sunscreen to prevent skin cancer.  Have a dental exam and cleaning every 6 months.  Yearly exams  See your health care team every year to talk about:  Any changes in your health.  Any medicines your care team has prescribed.  Preventive care, family planning, and ways to prevent chronic diseases.  Shots (vaccines)   HPV shots (up to age 26), if you've never had them before.  Hepatitis B shots (up to age 59), if you've never had them before.  COVID-19 shot: Get this shot when it's due.  Flu shot: Get a flu shot every year.  Tetanus shot: Get a tetanus shot every 10 years.  Pneumococcal, hepatitis A, and RSV shots: Ask your care team if you need these based on your risk.  Shingles shot (for age 50 and up).  General health tests  Diabetes screening:  Starting at age 35, Get screened for diabetes at least every 3 years.  If you are younger than age 35, ask your care team if you should be screened for diabetes.  Cholesterol test: At age 39, start having a cholesterol test every 5 years, or more often if advised.  Bone density scan (DEXA): At age 50, ask your care team if you should have this scan for osteoporosis (brittle bones).  Hepatitis C: Get tested at least once in your life.  STIs (sexually transmitted  infections)  Before age 24: Ask your care team if you should be screened for STIs.  After age 24: Get screened for STIs if you're at risk. You are at risk for STIs (including HIV) if:  You are sexually active with more than one person.  You don't use condoms every time.  You or a partner was diagnosed with a sexually transmitted infection.  If you are at risk for HIV, ask about PrEP medicine to prevent HIV.  Get tested for HIV at least once in your life, whether you are at risk for HIV or not.  Cancer screening tests  Cervical cancer screening: If you have a cervix, begin getting regular cervical cancer screening tests at age 21. Most people who have regular screenings with normal results can stop after age 65. Talk about this with your provider.  Breast cancer scan (mammogram): If you've ever had breasts, begin having regular mammograms starting at age 40. This is a scan to check for breast cancer.  Colon cancer screening: It is important to start screening for colon cancer at age 45.  Have a colonoscopy test every 10 years (or more often if you're at risk) Or, ask your provider about stool tests like a FIT test every year or Cologuard test every 3 years.  To learn more about your testing options, visit: https://www.[employer]/457128.pdf.  For help making a decision, visit: https://bit.ly/ob07373.  Prostate cancer screening test: If you have a prostate and are age 55 to 69, ask your provider if you would benefit from a yearly prostate cancer screening test.  Lung cancer screening: If you are a current or former smoker age 50 to 80, ask your care team if ongoing lung cancer screenings are right for you.  For informational purposes only. Not to replace the advice of your health care provider. Copyright   2023 [employer]. All rights reserved. Clinically reviewed by the [employer] [facility]. 3CL[employer] 549081 - REV 01/24.    Hearing Loss: Care Instructions  Overview     Hearing loss is a  sudden or slow decrease in how well you hear. It can range from slight to profound. Permanent hearing loss can occur with aging. It also can happen when you are exposed long-term to loud noise. Examples include listening to loud music, riding motorcycles, or being around other loud machines.  Hearing loss can affect your work and home life. It can make you feel lonely or depressed. You may feel that you have lost your independence. But hearing aids and other devices can help you hear better and feel connected to others.  Follow-up care is a key part of your treatment and safety. Be sure to make and go to all appointments, and call your doctor if you are having problems. It's also a good idea to know your test results and keep a list of the medicines you take.  How can you care for yourself at home?  Avoid loud noises whenever possible. This helps keep your hearing from getting worse.  Always wear hearing protection around loud noises.  Wear a hearing aid as directed.  A professional can help you pick a hearing aid that will work best for you.  You can also get hearing aids over the counter for mild to moderate hearing loss.  Have hearing tests as your doctor suggests. They can show whether your hearing has changed. Your hearing aid may need to be adjusted.  Use other devices as needed. These may include:  Telephone amplifiers and hearing aids that can connect to a television, stereo, radio, or microphone.  Devices that use lights or vibrations. These alert you to the doorbell, a ringing telephone, or a baby monitor.  Television closed-captioning. This shows the words at the bottom of the screen. Most new TVs can do this.  TTY (text telephone). This lets you type messages back and forth on the telephone instead of talking or listening. These devices are also called TDD. When messages are typed on the keyboard, they are sent over the phone line to a receiving TTY. The message is shown on a monitor.  Use text  "messaging, social media, and email if it is hard for you to communicate by telephone.  Try to learn a listening technique called speechreading. It is not lipreading. You pay attention to people's gestures, expressions, posture, and tone of voice. These clues can help you understand what a person is saying. Face the person you are talking to, and have them face you. Make sure the lighting is good. You need to see the other person's face clearly.  Think about counseling if you need help to adjust to your hearing loss.  When should you call for help?  Watch closely for changes in your health, and be sure to contact your doctor if:    You think your hearing is getting worse.     You have new symptoms, such as dizziness or nausea.   Where can you learn more?  Go to https://www.[employer].net/patiented  Enter R798 in the search box to learn more about \"Hearing Loss: Care Instructions.\"  Current as of: September 27, 2023               Content Version: 14.0    9004-6785 [employer].   Care instructions adapted under license by your healthcare professional. If you have questions about a medical condition or this instruction, always ask your healthcare professional. [employer] disclaims any warranty or liability for your use of this information.      Bladder Training: Care Instructions  Your Care Instructions     Bladder training is used to treat urge incontinence and stress incontinence. Urge incontinence means that the need to urinate comes on so fast that you can't get to a toilet in time. Stress incontinence means that you leak urine because of pressure on your bladder. For example, it may happen when you laugh, cough, or lift something heavy.  Bladder training can increase how long you can wait before you have to urinate. It can also help your bladder hold more urine. And it can give you better control over the urge to urinate.  It is important to remember that bladder training takes a few " weeks to a few months to make a difference. You may not see results right away, but don't give up.  Follow-up care is a key part of your treatment and safety. Be sure to make and go to all appointments, and call your doctor if you are having problems. It's also a good idea to know your test results and keep a list of the medicines you take.  How can you care for yourself at home?  Work with your doctor to come up with a bladder training program that is right for you. You may use one or more of the following methods.  Delayed urination  In the beginning, try to keep from urinating for 5 minutes after you first feel the need to go.  While you wait, take deep, slow breaths to relax. Kegel exercises can also help you delay the need to go to the bathroom.  After some practice, when you can easily wait 5 minutes to urinate, try to wait 10 minutes before you urinate.  Slowly increase the waiting period until you are able to control when you have to urinate.  Scheduled urination  Empty your bladder when you first wake up in the morning.  Schedule times throughout the day when you will urinate.  Start by going to the bathroom every hour, even if you don't need to go.  Slowly increase the time between trips to the bathroom.  When you have found a schedule that works well for you, keep doing it.  If you wake up during the night and have to urinate, do it. Apply your schedule to waking hours only.  Kegel exercises  These tighten and strengthen pelvic muscles, which can help you control the flow of urine. (If doing these exercises causes pain, stop doing them and talk with your doctor.) To do Kegel exercises:  Squeeze your muscles as if you were trying not to pass gas. Or squeeze your muscles as if you were stopping the flow of urine. Your belly, legs, and buttocks shouldn't move.  Hold the squeeze for 3 seconds, then relax for 5 to 10 seconds.  Start with 3 seconds, then add 1 second each week until you are able to squeeze for  "10 seconds.  Repeat the exercise 10 times a session. Do 3 to 8 sessions a day.  When should you call for help?  Watch closely for changes in your health, and be sure to contact your doctor if:    Your incontinence is getting worse.     You do not get better as expected.   Where can you learn more?  Go to https://www.[employer].net/patiented  Enter V684 in the search box to learn more about \"Bladder Training: Care Instructions.\"  Current as of: November 15, 2023               Content Version: 14.0    9884-3493 [employer].   Care instructions adapted under license by your healthcare professional. If you have questions about a medical condition or this instruction, always ask your healthcare professional. [employer] disclaims any warranty or liability for your use of this information.    Everything looks fine!    Refills of medications have been faxed to your pharmacy.     Lab results will be available soon on [facility].    See me in a year, sooner if problems.         "

## 2024-04-19 NOTE — PROGRESS NOTES
Preventive Care Visit  [facility]  Bong Vanegas MD, Internal Medicine  Apr 19, 2024      Assessment & Plan   (Z00.00) Encounter for Medicare annual wellness exam  (primary encounter diagnosis)  Comment: Stable health. See epic orders.     (I10) Essential hypertension with goal blood pressure less than 140/90  Comment: BP at target. Continue current meds.   Plan: triamterene-HCTZ (MAXZIDE-25) 37.5-25 MG         tablet, OFFICE/OUTPT VISIT,EST,LEVL III          (E78.5) Hyperlipidemia LDL goal <100  Comment: Update lipids. Continue current meds.   Plan: Lipid panel reflex to direct LDL Fasting,         Comprehensive metabolic panel, simvastatin         (ZOCOR) 20 MG tablet, OFFICE/OUTPT         VISIT,EST,LEVL III          (N52.31) Erectile dysfunction after radical prostatectomy  (C61) Prostate cancer (H)  Comment: Continue to work with Urology.     (H91.93) Bilateral hearing loss, unspecified hearing loss type  Plan: Adult Audiology Referral          (Z79.899) Medication management  Plan: TSH with free T4 reflex, CBC with platelets, T4        free            Patient has been advised of split billing requirements and indicates understanding: Yes      BMI  Estimated body mass index is 30.79 kg/m  as calculated from the following:    Height as of this encounter: 1.829 m (6').    Weight as of this encounter: 103 kg (227 lb).   Weight management plan: Discussed healthy diet and exercise guidelines    Counseling  Appropriate preventive services were discussed with this patient, including applicable screening as appropriate for fall prevention, nutrition, physical activity, Tobacco-use cessation, weight loss and cognition.  Checklist reviewing preventive services available has been given to the patient.  Reviewed patient's diet, addressing concerns and/or questions.   Patient reported safety concerns were addressed today.The patient was provided with written information regarding signs of hearing  "loss.   Information on urinary incontinence and treatment options given to patient.       Patient Instructions   Everything looks fine!    Refills of medications have been faxed to your pharmacy.     Lab results will be available soon on [facility].    See me in a year, sooner if problems.             Subjective   Kaleb is a 68 year old, presenting for the following:  Medicare Visit        4/19/2024     8:13 AM   Additional Questions   Roomed by Edda CARLTON CMA         Health Care Directive  Patient does not have a Health Care Directive or Living Will: Patient states has Advance Directive and will bring in a copy to clinic.    HPI  In addition to an Annual Wellness Exam, we addressed hypertension, hyperlipidemia, erectile dysfunction and frequency/slowing of urinary stream.     BP appears satisfactorily controlled on current meds.     Reviewed his 10-year risk of stroke/heart attack. He continues on simvastatin.     Tadalafil did seem to work in the paxt to some degree for urinary symptoms, but he is no longer taking tadalafil.   He has worked with Urology for treatments for erectile dysfunction. He has tried sildenafil, tadalafil, vacuum erection device, and more recently was prescribed intracavernous Trimix\"            4/12/2024   General Health   How would you rate your overall physical health? Good   Feel stress (tense, anxious, or unable to sleep) Not at all         4/12/2024   Nutrition   Diet: Regular (no restrictions)         4/12/2024   Exercise   Days per week of moderate/strenous exercise 6 days   Average minutes spent exercising at this level 50 min         4/12/2024   Social Factors   Frequency of gathering with friends or relatives Three times a week   Worry food won't last until get money to buy more No   Food not last or not have enough money for food? No   Do you have housing?  Yes   Are you worried about losing your housing? No   Lack of transportation? No   Unable to get utilities (heat,electricity)? No "         4/18/2024   Fall Risk   Fallen 2 or more times in the past year? No   Trouble with walking or balance? No          4/12/2024   Activities of Daily Living- Home Safety   Needs help with the following daily activites None of the above   Safety concerns in the home No grab bars in the bathroom         4/12/2024   Dental   Dentist two times every year? Yes         4/12/2024   Hearing Screening   Hearing concerns? (!) IT'S HARDER TO UNDERSTAND WOMEN'S VOICES THAN MEN'S VOICES.         4/12/2024   Driving Risk Screening   Patient/family members have concerns about driving No         4/12/2024   General Alertness/Fatigue Screening   Have you been more tired than usual lately? No         4/12/2024   Urinary Incontinence Screening   Bothered by leaking urine in past 6 months Yes         4/12/2024   TB Screening   Were you born outside of the [place]? No         Today's PHQ-2 Score:       4/18/2024    10:37 AM   PHQ-2 ( 1999 Pfizer)   Q1: Little interest or pleasure in doing things 0   Q2: Feeling down, depressed or hopeless 0   PHQ-2 Score 0   Q1: Little interest or pleasure in doing things Not at all   Q2: Feeling down, depressed or hopeless Not at all   PHQ-2 Score 0           4/12/2024   Substance Use   Alcohol more than 3/day or more than 7/wk No   Do you have a current opioid prescription? No   How severe/bad is pain from 1 to 10? 1/10   Do you use any other substances recreationally? (!) ALCOHOL     Social History     Tobacco Use    Smoking status: Never     Passive exposure: Yes    Smokeless tobacco: Former     Quit date: 1/1/2005    Tobacco comments:     cigar, rarely   Vaping Use    Vaping status: Never Used   Substance Use Topics    Alcohol use: Yes     Comment: <6 drinks/week. May have 2 beers or one mixed drinks on a given day    Drug use: No           4/12/2024   AAA Screening   Family history of Abdominal Aortic Aneurysm (AAA)? No   Last PSA:   PSA   Date Value Ref Range Status   03/12/2021 3.21 0 - 4 ug/L  Final     Comment:     Assay Method:  Chemiluminescence using Siemens Vista analyzer     Prostate Specific Antigen Screen   Date Value Ref Range Status   03/25/2022 5.20 (H) 0.00 - 4.00 ug/L Final     PSA Tumor Marker   Date Value Ref Range Status   12/20/2023 <0.01 0.00 - 4.50 ng/mL Final   11/04/2022 <0.01 0.00 - 4.00 ug/L Final     ASCVD Risk   The 10-year ASCVD risk score (Belia AGUILAR, et al., 2019) is: 12.6%    Values used to calculate the score:      Age: 68 years      Sex: Male      Is Non- : No      Diabetic: No      Tobacco smoker: No      Systolic Blood Pressure: 130 mmHg      Is BP treated: No      HDL Cholesterol: 56 mg/dL      Total Cholesterol: 163 mg/dL          Reviewed and updated as needed this visit by Provider                    Past medical, family and social histories as well as medications reviewed and updated as needed.    Current providers sharing in care for this patient include:  Patient Care Team:  Bong Vanegas MD as PCP - General (Internal Medicine)  Bong Vanegas MD as Assigned PCP  Luke López MD as MD (Urology)  Luke López MD as Assigned Surgical Provider    The following health maintenance items are reviewed in Epic and correct as of today:  Health Maintenance   Topic Date Due    LIPID  04/07/2024    ANNUAL REVIEW OF HM ORDERS  04/07/2024    MEDICARE ANNUAL WELLNESS VISIT  04/07/2024    FALL RISK ASSESSMENT  04/19/2025    GLUCOSE  04/07/2026    COLORECTAL CANCER SCREENING  12/04/2027    DTAP/TDAP/TD IMMUNIZATION (2 - Td or Tdap) 02/16/2028    ADVANCE CARE PLANNING  04/16/2028    HEPATITIS C SCREENING  Completed    PHQ-2 (once per calendar year)  Completed    INFLUENZA VACCINE  Completed    Pneumococcal Vaccine: 65+ Years  Completed    ZOSTER IMMUNIZATION  Completed    RSV VACCINE (Pregnancy & 60+)  Completed    COVID-19 Vaccine  Completed    IPV IMMUNIZATION  Aged Out    HPV IMMUNIZATION  Aged Out    MENINGITIS IMMUNIZATION  Aged Out     RSV MONOCLONAL ANTIBODY  Aged Out       REVIEW OF SYSTEMS: The following systems have been completely reviewed and are negative except as noted above:   Constitutional, HEENT, respiratory, cardiovascular, gastrointestinal, genitourinary, musculoskeletal, dermatologic, hematologic, endocrine, psychiatric, and neurologic systems.       Objective    Exam  /81 (BP Location: Right arm, Patient Position: Sitting, Cuff Size: Adult Large)   Pulse 61   Temp 97.7  F (36.5  C) (Tympanic)   Resp 18   Ht 1.829 m (6')   Wt 103 kg (227 lb)   SpO2 94%   BMI 30.79 kg/m     Estimated body mass index is 30.79 kg/m  as calculated from the following:    Height as of this encounter: 1.829 m (6').    Weight as of this encounter: 103 kg (227 lb).    Physical Exam  GENERAL: alert and no distress  EYES: Eyes grossly normal to inspection, PERRL and conjunctivae and sclerae normal  HENT: ear canals and TM's normal, nose and mouth without ulcers or lesions  NECK: no adenopathy, no asymmetry, masses, or scars  RESP: lungs clear to auscultation - no rales, rhonchi or wheezes  CV: regular rate and rhythm, normal S1 S2, no S3 or S4, no murmur, click or rub, no peripheral edema  ABDOMEN: soft, nontender, no hepatosplenomegaly, no masses and bowel sounds normal  MS: no gross musculoskeletal defects noted, no edema  SKIN: no suspicious lesions or rashes  NEURO: Normal strength and tone, mentation intact and speech normal  PSYCH: mentation appears normal, affect normal/bright        4/19/2024   Mini Cog   Clock Draw Score 2 Normal   3 Item Recall 3 objects recalled   Mini Cog Total Score 5              Signed Electronically by: Bong Vanegas MD,

## 2024-04-21 LAB
ALBUMIN SERPL BCG-MCNC: 4.5 G/DL (ref 3.5–5.2)
ALP SERPL-CCNC: 81 U/L (ref 40–150)
ALT SERPL W P-5'-P-CCNC: 45 U/L (ref 0–70)
ANION GAP SERPL CALCULATED.3IONS-SCNC: 9 MMOL/L (ref 7–15)
AST SERPL W P-5'-P-CCNC: 39 U/L (ref 0–45)
BILIRUB SERPL-MCNC: 0.7 MG/DL
BUN SERPL-MCNC: 24 MG/DL (ref 8–23)
CALCIUM SERPL-MCNC: 9.6 MG/DL (ref 8.8–10.2)
CHLORIDE SERPL-SCNC: 101 MMOL/L (ref 98–107)
CHOLEST SERPL-MCNC: 175 MG/DL
CREAT SERPL-MCNC: 0.97 MG/DL (ref 0.67–1.17)
DEPRECATED HCO3 PLAS-SCNC: 28 MMOL/L (ref 22–29)
EGFRCR SERPLBLD CKD-EPI 2021: 86 ML/MIN/1.73M2
FASTING STATUS PATIENT QL REPORTED: YES
GLUCOSE SERPL-MCNC: 96 MG/DL (ref 70–99)
HDLC SERPL-MCNC: 47 MG/DL
LDLC SERPL CALC-MCNC: 94 MG/DL
NONHDLC SERPL-MCNC: 128 MG/DL
POTASSIUM SERPL-SCNC: 4 MMOL/L (ref 3.4–5.3)
PROT SERPL-MCNC: 7.5 G/DL (ref 6.4–8.3)
PSA SERPL DL<=0.01 NG/ML-MCNC: <0.01 NG/ML (ref 0–4.5)
SODIUM SERPL-SCNC: 138 MMOL/L (ref 135–145)
T4 FREE SERPL-MCNC: 1.42 NG/DL (ref 0.9–1.7)
TRIGL SERPL-MCNC: 170 MG/DL
TSH SERPL DL<=0.005 MIU/L-ACNC: 5.04 UIU/ML (ref 0.3–4.2)

## 2024-04-25 ENCOUNTER — OFFICE VISIT (OUTPATIENT)
Dept: UROLOGY | Facility: CLINIC | Age: 69
End: 2024-04-25
Payer: COMMERCIAL

## 2024-04-25 VITALS
HEART RATE: 54 BPM | SYSTOLIC BLOOD PRESSURE: 143 MMHG | HEIGHT: 72 IN | BODY MASS INDEX: 30.07 KG/M2 | DIASTOLIC BLOOD PRESSURE: 85 MMHG | WEIGHT: 222 LBS

## 2024-04-25 DIAGNOSIS — C61 PROSTATE CANCER (H): Primary | ICD-10-CM

## 2024-04-25 DIAGNOSIS — N39.3 SUI (STRESS URINARY INCONTINENCE), MALE: ICD-10-CM

## 2024-04-25 DIAGNOSIS — N52.31 ERECTILE DYSFUNCTION AFTER RADICAL PROSTATECTOMY: ICD-10-CM

## 2024-04-25 PROCEDURE — 99213 OFFICE O/P EST LOW 20 MIN: CPT | Performed by: STUDENT IN AN ORGANIZED HEALTH CARE EDUCATION/TRAINING PROGRAM

## 2024-04-25 ASSESSMENT — PAIN SCALES - GENERAL: PAINLEVEL: NO PAIN (0)

## 2024-04-25 NOTE — LETTER
4/25/2024       RE: Garrett Childers  [address] [facility] [id number]     Dear Colleague,    Thank you for referring your patient, Garrett Childers, to the [facility] UROLOGY CLINIC [place] at [facility]. Please see a copy of my visit note below.    CHIEF COMPLAINT   Garrett Childers who is a 68 year old male returns today for follow-up of prostate cancer s/p robotic radical prostatectomy and bilateral pelvic lymph node dissection 8/12/2022 (iPSA 5.2, [place] 3+4=7, +BN invasion, + margin @ bladder neck) with combined robotic left inguinal hernia repair, post prostatectomy erectile dysfunction, stress urinary incontinence   .      HPI   Garrett Childers is a 68 year old male returns today for follow-up of prostate cancer s/p robotic radical prostatectomy and bilateral pelvic lymph node dissection 8/12/2022 (iPSA 5.2, [place] 3+4=7, +BN invasion, + margin @ bladder neck) with combined robotic left inguinal hernia repair, post prostatectomy erectile dysfunction, stress urinary incontinence    Trimix is working well for erectile dysfunction which is good news. He is still having some leakage of urine with arousal but not as big a deal at the moment. He is using titrating the dose depending, from 12-16 units or so      PHYSICAL EXAM  Patient is a 68 year old  male   Vitals: Blood pressure (!) 143/85, pulse 54, height 1.829 m (6'), weight 100.7 kg (222 lb).  Body mass index is 30.11 kg/m .  General Appearance Adult:   Alert, no acute distress, oriented  HENT: throat/mouth:normal, good dentition  Lungs: no respiratory distress, or pursed lip breathing  Heart: No obvious jugular venous distension present  Abdomen: nondistended  Musculoskeltal: extremities normal, no peripheral edema  Skin: no suspicious lesions or rashes  Neuro: Alert, oriented, speech and mentation normal  Psych: affect and mood normal  Gait: Normal    Component      Latest Ref Rng 4/19/2024  9:09  AM   PSA Tumor Marker      0.00 - 4.50 ng/mL <0.01          ASSESSMENT and PLAN  68 year old male returns today for follow-up of prostate cancer s/p robotic radical prostatectomy and bilateral pelvic lymph node dissection 8/12/2022 (iPSA 5.2, [place]ie 3+4=7, +BN invasion, + margin @ bladder neck) with combined robotic left inguinal hernia repair, post prostatectomy erectile dysfunction, stress urinary incontinence    Re: ED: doing well on trimix at relatively low dose, continue this    Re: stress urinary incontinence, will leak when he drinks large amounts of EtOH otherwise is dry; he does leak some urine with arousal. He keeps doing kegels. If he continues to have bothersome symptoms he can see   recon service at [facility] for consideration of treatment (possible male sling)    Re: prostate cancer, PSA undetectable today. Discussed [facility] post prostatectomy nomogram. He is at risk for cancer recurrence d/t positive margin, invasion of bladder neck    - return 4 months with PSA prior      Luke López MD   [facility]  [facility] Phone: [phone]

## 2024-04-25 NOTE — NURSING NOTE
Chief Complaint   Patient presents with    Prostate Cancer     Review PSA     Pt has no new urinary symptoms since last visit    Kenna Vallecillo, EMT

## 2024-04-25 NOTE — PROGRESS NOTES
CHIEF COMPLAINT   Garrett Childers who is a 68 year old male returns today for follow-up of prostate cancer s/p robotic radical prostatectomy and bilateral pelvic lymph node dissection 8/12/2022 (iPSA 5.2, [place] 3+4=7, +BN invasion, + margin @ bladder neck) with combined robotic left inguinal hernia repair, post prostatectomy erectile dysfunction, stress urinary incontinence   .      HPI   Garrett Childers is a 68 year old male returns today for follow-up of prostate cancer s/p robotic radical prostatectomy and bilateral pelvic lymph node dissection 8/12/2022 (iPSA 5.2, [place] 3+4=7, +BN invasion, + margin @ bladder neck) with combined robotic left inguinal hernia repair, post prostatectomy erectile dysfunction, stress urinary incontinence    Trimix is working well for erectile dysfunction which is good news. He is still having some leakage of urine with arousal but not as big a deal at the moment. He is using titrating the dose depending, from 12-16 units or so      PHYSICAL EXAM  Patient is a 68 year old  male   Vitals: Blood pressure (!) 143/85, pulse 54, height 1.829 m (6'), weight 100.7 kg (222 lb).  Body mass index is 30.11 kg/m .  General Appearance Adult:   Alert, no acute distress, oriented  HENT: throat/mouth:normal, good dentition  Lungs: no respiratory distress, or pursed lip breathing  Heart: No obvious jugular venous distension present  Abdomen: nondistended  Musculoskeltal: extremities normal, no peripheral edema  Skin: no suspicious lesions or rashes  Neuro: Alert, oriented, speech and mentation normal  Psych: affect and mood normal  Gait: Normal    Component      Latest Ref Rng 4/19/2024  9:09 AM   PSA Tumor Marker      0.00 - 4.50 ng/mL <0.01          ASSESSMENT and PLAN  68 year old male returns today for follow-up of prostate cancer s/p robotic radical prostatectomy and bilateral pelvic lymph node dissection 8/12/2022 (iPSA 5.2, [place] 3+4=7, +BN invasion, + margin @ bladder neck) with combined  robotic left inguinal hernia repair, post prostatectomy erectile dysfunction, stress urinary incontinence    Re: ED: doing well on trimix at relatively low dose, continue this    Re: stress urinary incontinence, will leak when he drinks large amounts of EtOH otherwise is dry; he does leak some urine with arousal. He keeps doing kegels. If he continues to have bothersome symptoms he can see   recon service at [facility] for consideration of treatment (possible male sling)    Re: prostate cancer, PSA undetectable today. Discussed [facility] post prostatectomy nomogram. He is at risk for cancer recurrence d/t positive margin, invasion of bladder neck    - return 4 months with PSA prior      Luke López MD   [facility]  [facility] Phone: [phone]

## 2024-06-05 NOTE — PROGRESS NOTES
AUDIOLOGY REPORT    SUBJECTIVE:  Garrett Childers is a 68 year old male who was seen in the Audiology Clinic at the [facility] for audiologic evaluation, referred by Bong Vanegas M.D.  The patient notes difficulty with communication in a variety of listening situations. They were accompanied today by their self. Patient reports a possible decline in hearing in both ears over the years. His wife tells him the TV is too loud. Patient has also noticed difficulty hearing in certain situations such as hearing/understanding the work out instructor, and hearing in quiet restaurants/bars. Recalls feeling as though he just could not hear the speaker talking on a couple occasions. He denied otalgia, otorrhea, dizziness, aural fullness, tinnitus, history of noise exposure, history of ear surgery, family history of hearing loss, and previous hearing aid use.     OBJECTIVE:  Abuse Screening:  Do you feel unsafe at home or work/school? No  Do you feel threatened by someone? No  Does anyone try to keep you from having contact with others, or doing things outside of your home? No  Physical signs of abuse present? No     Fall Risk Screen:  1. Have you fallen two or more times in the past year? No  2. Have you fallen and had an injury in the past year? No      Otoscopic exam indicates cerumen bilaterally.  Non-occluding cerumen in the right ear, and excessive cerumen in the left ear. Removed large amount of cerumen from the left via lighted curette without incident and with patient verbal consent.     Pure Tone Thresholds assessed using conventional audiometry with good reliability from 250-8000 Hz bilaterally using insert earphones and circumaural headphones.     RIGHT:  Normal hearing sensitivity    LEFT:   Normal hearing from 250-3000 Hz, sloping to a mild sensorineural hearing loss.     Tympanogram:    RIGHT: Normal eardrum mobility    LEFT: Normal eardrum mobility    Reflexes (reported by stimulus  ear):  RIGHT: Ipsilateral is present at normal levels  RIGHT: Contralateral is unable to test contra due to equipment limitations  LEFT:   Ipsilateral is present at normal levels  LEFT:   Contralateral is unable to test contra due to equipment limitations    Speech Reception Threshold:    RIGHT: 15 dB HL    LEFT:   15 dB HL    Word Recognition Score:     RIGHT: 100% at 60 dB HL using NU-6 recorded word list.    LEFT:   96% at 60 dB HL using NU-6 recorded word list.    Tianna: Negative    ASSESSMENT:   Today's results reveal normal hearing sensitivity in the right ear, and normal hearing from 250-3000 Hz, sloping to a mild sensorineural hearing loss in the left ear. Today s results were discussed with the patient in detail.     PLAN:   Monitor hearing with repeat hearing test in one year. Consider ENT follow up if asymmetry worsens. Return sooner if new concerns arise. Please call this clinic with questions regarding these results or recommendations.    Samia Arechiga., CCC-A  [place] Licensed Audiologist #[id number]

## 2024-06-07 ENCOUNTER — OFFICE VISIT (OUTPATIENT)
Dept: AUDIOLOGY | Facility: CLINIC | Age: 69
End: 2024-06-07
Payer: COMMERCIAL

## 2024-06-07 DIAGNOSIS — H91.93 BILATERAL HEARING LOSS, UNSPECIFIED HEARING LOSS TYPE: ICD-10-CM

## 2024-06-07 DIAGNOSIS — H90.42 SENSORINEURAL HEARING LOSS (SNHL) OF LEFT EAR WITH UNRESTRICTED HEARING OF RIGHT EAR: Primary | ICD-10-CM

## 2024-06-07 PROCEDURE — 92565 STENGER TEST PURE TONE: CPT | Performed by: AUDIOLOGIST

## 2024-06-07 PROCEDURE — 92557 COMPREHENSIVE HEARING TEST: CPT | Performed by: AUDIOLOGIST

## 2024-06-07 PROCEDURE — 92550 TYMPANOMETRY & REFLEX THRESH: CPT | Mod: 52 | Performed by: AUDIOLOGIST

## 2024-08-16 ENCOUNTER — LAB (OUTPATIENT)
Dept: LAB | Facility: CLINIC | Age: 69
End: 2024-08-16
Payer: COMMERCIAL

## 2024-08-16 DIAGNOSIS — C61 PROSTATE CANCER (H): ICD-10-CM

## 2024-08-16 LAB — PSA SERPL DL<=0.01 NG/ML-MCNC: <0.01 NG/ML (ref 0–4.5)

## 2024-08-16 PROCEDURE — 84153 ASSAY OF PSA TOTAL: CPT

## 2024-08-16 PROCEDURE — 36415 COLL VENOUS BLD VENIPUNCTURE: CPT

## 2024-08-27 ENCOUNTER — OFFICE VISIT (OUTPATIENT)
Dept: UROLOGY | Facility: CLINIC | Age: 69
End: 2024-08-27
Payer: COMMERCIAL

## 2024-08-27 VITALS
BODY MASS INDEX: 29.12 KG/M2 | SYSTOLIC BLOOD PRESSURE: 118 MMHG | HEIGHT: 72 IN | DIASTOLIC BLOOD PRESSURE: 72 MMHG | WEIGHT: 215 LBS

## 2024-08-27 DIAGNOSIS — N52.31 ERECTILE DYSFUNCTION AFTER RADICAL PROSTATECTOMY: ICD-10-CM

## 2024-08-27 DIAGNOSIS — C61 PROSTATE CANCER (H): Primary | ICD-10-CM

## 2024-08-27 DIAGNOSIS — N39.3 SUI (STRESS URINARY INCONTINENCE), MALE: ICD-10-CM

## 2024-08-27 PROCEDURE — 99214 OFFICE O/P EST MOD 30 MIN: CPT | Performed by: STUDENT IN AN ORGANIZED HEALTH CARE EDUCATION/TRAINING PROGRAM

## 2024-08-27 RX ORDER — SILDENAFIL 100 MG/1
100 TABLET, FILM COATED ORAL DAILY
Qty: 90 TABLET | Refills: 3 | Status: SHIPPED | OUTPATIENT
Start: 2024-08-27

## 2024-08-27 ASSESSMENT — PAIN SCALES - GENERAL: PAINLEVEL: NO PAIN (0)

## 2024-08-27 NOTE — NURSING NOTE
Chief Complaint   Patient presents with    Prostate Cancer     4 months with PSA      Hilda Wilson CMA

## 2024-08-27 NOTE — PROGRESS NOTES
CHIEF COMPLAINT   Garrett Childers who is a 68 year old male returns today for follow-up of prostate cancer s/p robotic radical prostatectomy and bilateral pelvic lymph node dissection 8/12/2022 (iPSA 5.2, [place]ie 3+4=7, +BN invasion, + margin @ bladder neck) with combined robotic left inguinal hernia repair, post prostatectomy erectile dysfunction, stress urinary incontinence     HPI   Garrett Childers is a 68 year old male returns today for follow-up of prostate cancer s/p robotic radical prostatectomy and bilateral pelvic lymph node dissection 8/12/2022 (iPSA 5.2, [place] 3+4=7, +BN invasion, + margin @ bladder neck) with combined robotic left inguinal hernia repair, post prostatectomy erectile dysfunction, stress urinary incontinence     Re: erectile dysfunction, has had some erections on sildenafil alone without injections so he wants to continue sildenafil with as needed trimix    Stress urinary incontinence persists with sexual excitement and sometimes with lifting but otherwise no leakage. Wears a pad for protection    PHYSICAL EXAM  Patient is a 68 year old  male   Vitals: Blood pressure 118/72, height 1.829 m (6'), weight 97.5 kg (215 lb).  Body mass index is 29.16 kg/m .  General Appearance Adult:   Alert, no acute distress, oriented  HENT: throat/mouth:normal, good dentition  Lungs: no respiratory distress, or pursed lip breathing  Heart: No obvious jugular venous distension present  Abdomen: nondistended  Musculoskeltal: extremities normal, no peripheral edema  Skin: no suspicious lesions or rashes  Neuro: Alert, oriented, speech and mentation normal  Psych: affect and mood normal  Gait: Normal  : deferred       PSA PSA Tumor Marker   Latest Ref Rng 0.00 - 4.00 ug/L 0.00 - 4.50 ng/mL   6/8/2007  12:00 AM 1.0@ (E)    9/30/2008  11:39 AM 1.32     10/26/2009  8:54 AM 1.09     11/13/2010  8:37 AM 1.28     11/11/2011  8:32 AM 1.41     10/26/2012  8:56 AM 1.65     12/14/2013  10:23 AM 1.81     12/10/2014  8:36  AM 2.02     11/20/2015  11:29 AM 2.67     12/2/2016  9:44 AM 2.92     2/16/2018  9:13 AM 3.84     2/22/2019  9:21 AM 3.69     3/6/2020  9:22 AM 3.42     3/12/2021  9:30 AM 3.21     11/4/2022  7:59 AM <0.01     2/6/2023  8:12 AM  <0.01    5/12/2023  7:58 AM  <0.01    8/29/2023  7:27 AM  <0.01    12/20/2023  7:14 AM  <0.01    4/19/2024  9:09 AM  <0.01    8/16/2024  7:20 AM  <0.01       Legend:  (E) External lab result    ASSESSMENT and PLAN  68 year old male returns today for follow-up of prostate cancer s/p robotic radical prostatectomy and bilateral pelvic lymph node dissection 8/12/2022 (iPSA 5.2, [place] 3+4=7, +BN invasion, + margin @ bladder neck) with combined robotic left inguinal hernia repair, post prostatectomy erectile dysfunction, stress urinary incontinence     Re: prostate cancer, PSA remains undetectable which is good news    Re: erectile dysfunction, some erections without injections (sildenafil alone) he wants to continue his current regimen. Will refill sildenafil 100 mg daily (rx drug management) and send a message to the nursing pool regarding the componounded trimix    Re: stress urinary incontinence, stable    - return 6 months with PSA prior    Luke López MD   [facility]  [facility] Phone: [phone]

## 2024-08-27 NOTE — LETTER
8/27/2024       RE: Garrett Childers  [address] [address] [address]     Dear Colleague,    Thank you for referring your patient, Garrett Childers, to the [facility]OLOGY [facility]IN[facility] [place] at [facility]. Please see a copy of my visit note below.    CHIEF COMPLAINT   Garrett Childers who is a 68 year old male returns today for follow-up of prostate cancer s/p robotic radical prostatectomy and bilateral pelvic lymph node dissection 8/12/2022 (iPSA 5.2, [place] 3+4=7, +BN invasion, + margin @ bladder neck) with combined robotic left inguinal hernia repair, post prostatectomy erectile dysfunction, stress urinary incontinence     HPI   Garrett Childers is a 68 year old male returns today for follow-up of prostate cancer s/p robotic radical prostatectomy and bilateral pelvic lymph node dissection 8/12/2022 (iPSA 5.2, [place] 3+4=7, +BN invasion, + margin @ bladder neck) with combined robotic left inguinal hernia repair, post prostatectomy erectile dysfunction, stress urinary incontinence     Re: erectile dysfunction, has had some erections on sildenafil alone without injections so he wants to continue sildenafil with as needed trimix    Stress urinary incontinence persists with sexual excitement and sometimes with lifting but otherwise no leakage. Wears a pad for protection    PHYSICAL EXAM  Patient is a 68 year old  male   Vitals: Blood pressure 118/72, height 1.829 m (6'), weight 97.5 kg (215 lb).  Body mass index is 29.16 kg/m .  General Appearance Adult:   Alert, no acute distress, oriented  HENT: throat/mouth:normal, good dentition  Lungs: no respiratory distress, or pursed lip breathing  Heart: No obvious jugular venous distension present  Abdomen: nondistended  Musculoskeltal: extremities normal, no peripheral edema  Skin: no suspicious lesions or rashes  Neuro: Alert, oriented, speech and mentation normal  Psych: affect and mood normal  Gait: Normal  :  deferred       PSA PSA Tumor Marker   Latest Ref Rng 0.00 - 4.00 ug/L 0.00 - 4.50 ng/mL   6/8/2007  12:00 AM 1.0@ (E)    9/30/2008  11:39 AM 1.32     10/26/2009  8:54 AM 1.09     11/13/2010  8:37 AM 1.28     11/11/2011  8:32 AM 1.41     10/26/2012  8:56 AM 1.65     12/14/2013  10:23 AM 1.81     12/10/2014  8:36 AM 2.02     11/20/2015  11:29 AM 2.67     12/2/2016  9:44 AM 2.92     2/16/2018  9:13 AM 3.84     2/22/2019  9:21 AM 3.69     3/6/2020  9:22 AM 3.42     3/12/2021  9:30 AM 3.21     11/4/2022  7:59 AM <0.01     2/6/2023  8:12 AM  <0.01    5/12/2023  7:58 AM  <0.01    8/29/2023  7:27 AM  <0.01    12/20/2023  7:14 AM  <0.01    4/19/2024  9:09 AM  <0.01    8/16/2024  7:20 AM  <0.01       Legend:  (E) External lab result    ASSESSMENT and PLAN  68 year old male returns today for follow-up of prostate cancer s/p robotic radical prostatectomy and bilateral pelvic lymph node dissection 8/12/2022 (iPSA 5.2, [place] 3+4=7, +BN invasion, + margin @ bladder neck) with combined robotic left inguinal hernia repair, post prostatectomy erectile dysfunction, stress urinary incontinence     Re: prostate cancer, PSA remains undetectable which is good news    Re: erectile dysfunction, some erections without injections (sildenafil alone) he wants to continue his current regimen. Will refill sildenafil 100 mg daily (rx drug management) and send a message to the nursing pool regarding the componounded trimix    Re: stress urinary incontinence, stable    - return 6 months with PSA prior    Luke López MD   [facility]  [facility] Phone: [phone]      Again, thank you for allowing me to participate in the care of your patient.      Sincerely,    Luke López MD

## 2024-12-17 DIAGNOSIS — N52.31 ERECTILE DYSFUNCTION AFTER RADICAL PROSTATECTOMY: ICD-10-CM

## 2025-01-29 ENCOUNTER — APPOINTMENT (OUTPATIENT)
Age: 70
Setting detail: DERMATOLOGY
End: 2025-01-29

## 2025-01-29 DIAGNOSIS — Z71.89 OTHER SPECIFIED COUNSELING: ICD-10-CM

## 2025-01-29 DIAGNOSIS — D22 MELANOCYTIC NEVI: ICD-10-CM

## 2025-01-29 DIAGNOSIS — Z87.2 PERSONAL HISTORY OF DISEASES OF THE SKIN AND SUBCUTANEOUS TISSUE: ICD-10-CM

## 2025-01-29 DIAGNOSIS — D18.0 HEMANGIOMA: ICD-10-CM

## 2025-01-29 DIAGNOSIS — L82.1 OTHER SEBORRHEIC KERATOSIS: ICD-10-CM

## 2025-01-29 DIAGNOSIS — Z85.828 PERSONAL HISTORY OF OTHER MALIGNANT NEOPLASM OF SKIN: ICD-10-CM

## 2025-01-29 PROBLEM — C44.319 BASAL CELL CARCINOMA OF SKIN OF OTHER PARTS OF FACE: Status: ACTIVE | Noted: 2025-01-29

## 2025-01-29 PROBLEM — D22.5 MELANOCYTIC NEVI OF TRUNK: Status: ACTIVE | Noted: 2025-01-29

## 2025-01-29 PROBLEM — D18.01 HEMANGIOMA OF SKIN AND SUBCUTANEOUS TISSUE: Status: ACTIVE | Noted: 2025-01-29

## 2025-01-29 PROCEDURE — 99213 OFFICE O/P EST LOW 20 MIN: CPT

## 2025-01-29 PROCEDURE — ? OBSERVATION

## 2025-01-29 PROCEDURE — ? DIAGNOSIS COMMENT

## 2025-01-29 PROCEDURE — ? SUNSCREEN RECOMMENDATIONS

## 2025-01-29 PROCEDURE — ? COUNSELING

## 2025-01-29 ASSESSMENT — LOCATION DETAILED DESCRIPTION DERM
LOCATION DETAILED: RIGHT FOREHEAD
LOCATION DETAILED: LEFT INFERIOR MEDIAL FOREHEAD
LOCATION DETAILED: MIDDLE STERNUM
LOCATION DETAILED: INFERIOR THORACIC SPINE
LOCATION DETAILED: SUPERIOR THORACIC SPINE
LOCATION DETAILED: RIGHT SUPERIOR MEDIAL MIDBACK
LOCATION DETAILED: EPIGASTRIC SKIN
LOCATION DETAILED: RIGHT ANTERIOR PROXIMAL THIGH

## 2025-01-29 ASSESSMENT — LOCATION SIMPLE DESCRIPTION DERM
LOCATION SIMPLE: LEFT FOREHEAD
LOCATION SIMPLE: ABDOMEN
LOCATION SIMPLE: RIGHT THIGH
LOCATION SIMPLE: RIGHT FOREHEAD
LOCATION SIMPLE: CHEST
LOCATION SIMPLE: RIGHT LOWER BACK
LOCATION SIMPLE: UPPER BACK

## 2025-01-29 ASSESSMENT — LOCATION ZONE DERM
LOCATION ZONE: TRUNK
LOCATION ZONE: LEG
LOCATION ZONE: FACE

## 2025-01-29 NOTE — PROCEDURE: OBSERVATION
Body Location Override (Optional - Billing Will Still Be Based On Selected Body Map Location If Applicable): right anterior thigh and left mid back
Detail Level: Detailed
Size Of Lesion In Cm (Optional): 0

## 2025-01-29 NOTE — PROCEDURE: COUNSELING
Detail Level: Generalized
Detail Level: Detailed
Patient Specific Counseling (Will Not Stick From Patient To Patient): Continue to watch the area for changes.

## 2025-02-19 ENCOUNTER — OFFICE VISIT (OUTPATIENT)
Dept: INTERNAL MEDICINE | Facility: CLINIC | Age: 70
End: 2025-02-19
Payer: COMMERCIAL

## 2025-02-19 VITALS
TEMPERATURE: 97.9 F | HEART RATE: 69 BPM | BODY MASS INDEX: 30.88 KG/M2 | SYSTOLIC BLOOD PRESSURE: 114 MMHG | RESPIRATION RATE: 18 BRPM | OXYGEN SATURATION: 95 % | HEIGHT: 72 IN | DIASTOLIC BLOOD PRESSURE: 66 MMHG | WEIGHT: 228 LBS

## 2025-02-19 DIAGNOSIS — C61 PROSTATE CANCER (H): ICD-10-CM

## 2025-02-19 DIAGNOSIS — H81.10 BENIGN PAROXYSMAL POSITIONAL VERTIGO, UNSPECIFIED LATERALITY: Primary | ICD-10-CM

## 2025-02-19 PROCEDURE — 3074F SYST BP LT 130 MM HG: CPT | Performed by: INTERNAL MEDICINE

## 2025-02-19 PROCEDURE — 99214 OFFICE O/P EST MOD 30 MIN: CPT | Performed by: INTERNAL MEDICINE

## 2025-02-19 PROCEDURE — 3078F DIAST BP <80 MM HG: CPT | Performed by: INTERNAL MEDICINE

## 2025-02-19 RX ORDER — MECLIZINE HYDROCHLORIDE 25 MG/1
25 TABLET ORAL 3 TIMES DAILY PRN
Qty: 30 TABLET | Refills: 2 | Status: SHIPPED | OUTPATIENT
Start: 2025-02-19

## 2025-02-19 NOTE — PATIENT INSTRUCTIONS
"This sounds like \"BPPV\", or benign paroxysmal positional vertigo.   Usually this is caused by calcium flecks that interfere with flow of fluid across hair fibers in the inner ear \"vestibular\" system, causing faulty signals to the brain about where your head is in space.    This often runs its course. However, I've placed an order for Vestibular therapy, where they can better diagnose and offer treatments.   Also sent a prescription for meclizine to be used up to three times daily as needed to manage symptoms. This only helps lessen symptoms, does not provide treatment for the condition so use is optional.     See me on 4/29/2025 as scheduled. Contact me sooner if problem worsens or fails to improve.   "

## 2025-02-21 ENCOUNTER — THERAPY VISIT (OUTPATIENT)
Dept: PHYSICAL THERAPY | Facility: CLINIC | Age: 70
End: 2025-02-21
Attending: INTERNAL MEDICINE
Payer: COMMERCIAL

## 2025-02-21 DIAGNOSIS — H81.10 BENIGN PAROXYSMAL POSITIONAL VERTIGO, UNSPECIFIED LATERALITY: ICD-10-CM

## 2025-02-21 PROCEDURE — 97161 PT EVAL LOW COMPLEX 20 MIN: CPT | Mod: GP | Performed by: PHYSICAL THERAPIST

## 2025-02-21 PROCEDURE — 95992 CANALITH REPOSITIONING PROC: CPT | Mod: GP | Performed by: PHYSICAL THERAPIST

## 2025-02-21 NOTE — PROGRESS NOTES
PHYSICAL THERAPY EVALUATION  Type of Visit: Evaluation    PT orders:  Benign paroxysmal positional vertigo, unspecified laterality [H81.10]  - Primary  02/19/25 1  Bong Vanegas MD    Subjective         Presenting condition or subjective complaint: vertigo  Date of onset: 02/14/25    Relevant medical history: Cancer; Dizziness; High blood pressure   Past Medical History:   Diagnosis Date    DDD (degenerative disc disease), lumbar     Hernia, abdominal     Prostate cancer (H) 8/12/2022    Unspecified essential hypertension      Dates & types of surgery:    Past Surgical History:   Procedure Laterality Date    COLONOSCOPY  11/29/2007    Normal, repeat in 2017    COLONOSCOPY N/A 12/04/2017    Int Hemorrhoids, o/w normal. Procedure: COLONOSCOPY;  Colonoscopy;  Surgeon: Angeles Vyas MD;  Location:  GI    DAVINCI PROSTATECTOMY, LYMPHADENECTOMY N/A 08/12/2022    Procedure: 1. Xi robotic assisted laparoscopic radical prostatectomy with bilateral pelvic lymph node dissection with vesicopexy;  Surgeon: Luke López MD;  Location:  OR    DAVINCI XI HERNIORRHAPHY INGUINAL Right 08/12/2022    Procedure: 2. Xi Robotic Assisted HERNIORRHAPHY, INGUINAL, LAPAROSCOPIC, WITH MESH;  Surgeon: Rizwan Gibbs MD;  Location:  OR    HERNIORRHAPHY INGUINAL Left 04/13/2015    Procedure: HERNIORRHAPHY INGUINAL;  Surgeon: Tyler Michael MD;  Location:  OR    ORTHOPEDIC SURGERY      corn shaved from left foot    SOFT TISSUE SURGERY  2015    TOTAL SHOULDER REPLACEMENT Right 10/2021    [facility] at [facility] NONSPECIFIC PROCEDURE  ~1977    Left knee surg for osteochondroma    [facility] NONSPECIFIC PROCEDURE  10/07/2015    Sebaceious cyst removal, left upper arm       VESTIBULAR EVALUATION  ADDITIONAL HISTORY:  Description of symptoms: Off balance; Light-headedness, spinning 1x day after onset  Dizzy attacks:   Start: Feb 14   Last attack: Today   Frequency of occurrences: caused by sudden movement of the  head   Length of attack: 10-15 seconds  Difficulty hearing:    Noise in ears? No    Alleviates symptoms: stop moving  Worsens symptoms: turning head, bending over, standing up quickly  Activities that bring on symptoms: Bending over; Turning while walking    Onset: [place] was [place] by a wave 2 days prior to onset of sxs  Current Sxs: Has been feeling a bit better the past 2 days   Balance: No LOB since dizziness started  Medications: Took meclizine 1x but doesn't find It helpful.   Ambulation: Walking wihtout AD  Limitations: Hasnt been exercising because of sxs    Pertinent history of current vestibular problem: no prior hx of dizziness    DHI: Total Score: (Patient-Rptd) 32    Prior therapy history for the same diagnosis, illness or injury: No        Living Environment  Social support: With a significant other or spouse   Type of home: House; 2-story; Basement   Stairs to enter the home: Yes 1 Is there a railing: No     Ramp: No   Stairs inside the home: Yes 13 Is there a railing: Yes     Help at home: None    Employment: No    Hobbies/Interests: working out, cribbage    Patient goals for therapy: eliminate dizziness from head movement    Pain assessment:  none     Objective                 Infrared Goggle Exam Vestibular Suppressant in Last 24 Hours? No  Exam Completed With: Infrared goggles   Spontaneous Nystagmus Negative   Gaze Evoked Nystagmus Negative   Head Shake Horizontal Nystagmus    Positional Testing    Supine Head-Hanging Test     Left Right   Liat-Hallpike Negative a little woozy return to sit, no nystagmus  Upbeating R torsional, sudden onset short durationgnystagmus and vertigo   Sidelying Test     [facility] Supine Roll Test     [facility] Forward Roll Test     [place] and [place]an Test -  Sitting Erect    [place] and [place]an Test - Seated, Head Bent 60 Degrees Forward    [place] and Lean Test - Seated, Head Bent Backwards       BPPV Canal(s): R Posterior  BPPV Type:  Canalithasis    Assessment & Plan   CLINICAL IMPRESSIONS  Medical Diagnosis: Benign paroxysmal positional vertigo, unspecified laterality (H81.10)  - Primary    Treatment Diagnosis: (+) s/s BPPV   Impression/Assessment: Patient is a 69 year old male with vertigo with head/positional changes.  The following significant findings have been identified:  (+) s/s BPPV with positional testing . These impairments interfere with their ability to perform recreational activities, household mobility, and community mobility as compared to previous level of function.     Clinical Decision Making (Complexity):  Clinical Presentation: Stable/Uncomplicated  Clinical Presentation Rationale: based on medical and personal factors listed in PT evaluation  Clinical Decision Making (Complexity): Low complexity    PLAN OF CARE  Treatment Interventions:  Interventions: Gait Training, Manual Therapy, Neuromuscular Re-education, Therapeutic Activity, Therapeutic Exercise, Self-Care/Home Management, Canalith Repositioning    Long Term Goals     PT Goal 1  Goal Identifier: BPPV - Positional Changes  Goal Description: Pt will demonstrate (-) s/s of BPPV throughout positional testing of all canals without limitations in bed mobility and transfers d/t dizziness  Rationale: to maximize safety and independence with performance of ADLs and functional tasks;to maximize safety and independence within the home;to maximize safety and independence with self cares  Target Date: 04/13/25      Frequency of Treatment: 1x/week  Duration of Treatment: 8 weeks    Education Assessment:   Learner/Method: Patient;Demonstration;Pictures/Video;Reading;Listening  Education Comments: Results of assessment and PT POC; ANPT handout on BPPV and education provided. ANATOMY and PHYSIOLOGY of vestibular system referencing poster of inner ear. DEFINITION of BPPV: Mechanical problem in the inner ear with free floating otoconia that can cause vertigo with position changes.  PREVALENCE: most common cause of dizziness associated with change in head position. ASSOCIATIONS: age, falls, OP, DM2, HTN, seasonally, post surgical when intubated, Meniere s disease, Migraines, Neuronitis/Labyrinthitis. HEREDITARY: Blood relatives are 5x more likely to develop BPPV. PROVOKING POSITIONS. TREATMENT: positional maneuvers. IMPORTANCE OF TREATMENT: inc fall risk. COMMON POST TREATMENT SYMPTOMS: residual dizziness and postural instability. POST TREATMENT INSTRUCTIONS:  Do not look up/down for a few hours   Don't lay down until bed tonight.  Sleeping recommendations: Sleep with head supported with extra pillow as preferred. TYPICAL RECOVERY TIME: 3-5 sessions. RESULTS OF TREATMENT: it can be fixed. RECURRENCE RATE: 15% recurrence rate/year and age can increase this. 50% within 5 years. Only 24% recurrence in the same canal. Nothing prevents recurrence of BPPV. Importance of not limiting movement - moving head and body to maintain a healthy vestibular system.    Risks and benefits of evaluation/treatment have been explained.   Patient/Family/caregiver agrees with Plan of Care.     Evaluation Time:     PT Eval, Low Complexity Minutes (89373): 20       Signing Clinician: Theresa Harmon, PT        [facility]                                                                                   OUTPATIENT PHYSICAL THERAPY      PLAN OF TREATMENT FOR OUTPATIENT REHABILITATION   Patient's Last Name, First Name, SUHAS ChildersGarrett  L YOB: 1955   Provider's Name   [facility]   Medical Record No.  [id number]     Onset Date: 02/14/25  Start of Care Date: 02/21/25     Medical Diagnosis:  Benign paroxysmal positional vertigo, unspecified laterality (H81.10)  - Primary      PT Treatment Diagnosis:  (+) s/s BPPV Plan of Treatment  Frequency/Duration: 1x/week/ 8 weeks    Certification date from 02/21/25 to 04/13/25         See note for plan of  treatment details and functional goals     Theresa Harmon, PT                         I CERTIFY THE NEED FOR THESE SERVICES FURNISHED UNDER        THIS PLAN OF TREATMENT AND WHILE UNDER MY CARE     (Physician attestation of this document indicates review and certification of the therapy plan).              Referring Provider:  Bong Vanegas    Initial Assessment  See Epic Evaluation- Start of Care Date: 02/21/25

## 2025-02-27 ENCOUNTER — OFFICE VISIT (OUTPATIENT)
Dept: UROLOGY | Facility: CLINIC | Age: 70
End: 2025-02-27
Payer: COMMERCIAL

## 2025-02-27 VITALS
DIASTOLIC BLOOD PRESSURE: 80 MMHG | WEIGHT: 228 LBS | BODY MASS INDEX: 30.88 KG/M2 | HEIGHT: 72 IN | SYSTOLIC BLOOD PRESSURE: 143 MMHG | HEART RATE: 57 BPM

## 2025-02-27 DIAGNOSIS — C61 PROSTATE CANCER (H): Primary | ICD-10-CM

## 2025-02-27 DIAGNOSIS — N39.3 SUI (STRESS URINARY INCONTINENCE), MALE: ICD-10-CM

## 2025-02-27 DIAGNOSIS — N52.31 ERECTILE DYSFUNCTION AFTER RADICAL PROSTATECTOMY: ICD-10-CM

## 2025-02-27 RX ORDER — TADALAFIL 5 MG/1
5 TABLET ORAL EVERY 24 HOURS
Qty: 90 TABLET | Refills: 3 | Status: SHIPPED | OUTPATIENT
Start: 2025-02-27

## 2025-02-27 ASSESSMENT — PAIN SCALES - GENERAL: PAINLEVEL_OUTOF10: NO PAIN (0)

## 2025-02-27 NOTE — NURSING NOTE
Chief Complaint   Patient presents with    Prostate Cancer     PSA review     Pt has no new concerns with urination.  Pt denies gross hematuria or dysuria.      Kenna Vallecillo, Clinic Assistant

## 2025-02-27 NOTE — LETTER
2/27/2025       RE: Garrett Childers  [address] [address]     Dear Colleague,    Thank you for referring your patient, Garrett Childers, to the [facility] [place] [facility]. Please see a copy of my visit note below.    CHIEF COMPLAINT   Garrett Childers who is a 69 year old male returns today for follow-up of prostate cancer s/p robotic radical prostatectomy and bilateral pelvic lymph node dissection 8/12/2022 (iPSA 5.2, [place] 3+4=7, +BN invasion, + margin @ bladder neck) with combined robotic left inguinal hernia repair, post prostatectomy erectile dysfunction, stress urinary incontinence     HPI   Garrett Childers is a 69 year old male returns today for follow-up of prostate cancer s/p robotic radical prostatectomy and bilateral pelvic lymph node dissection 8/12/2022 (iPSA 5.2, [place] 3+4=7, +BN invasion, + margin @ bladder neck) with combined robotic left inguinal hernia repair, post prostatectomy erectile dysfunction, stress urinary incontinence     Last seen aug 2024, we had continued sildenafil and also his trimix. He has been able to have some erectile function with sildenafil alone and not trimix. He has not been trying to time intercourse while on sidlenafil    PHYSICAL EXAM  Patient is a 69 year old  male   Vitals: Blood pressure (!) 143/80, pulse 57, height 1.829 m (6'), weight 103.4 kg (228 lb).  Body mass index is 30.92 kg/m .  General Appearance Adult:   Alert, no acute distress, oriented  HENT: throat/mouth:normal, good dentition  Lungs: no respiratory distress, or pursed lip breathing  Heart: No obvious jugular venous distension present  Abdomen: nondistended  Musculoskeltal: extremities normal, no peripheral edema  Skin: no suspicious lesions or rashes  Neuro: Alert, oriented, speech and mentation normal  Psych: affect and mood normal  Gait: Normal  : deferred     PSA PSA Tumor Marker   Latest Ref Rng 0.00  - 4.00 ug/L 0.00 - 4.50 ng/mL   6/8/2007  12:00 AM 1.0@ (E)    9/30/2008  11:39 AM 1.32     10/26/2009  8:54 AM 1.09     11/13/2010  8:37 AM 1.28     11/11/2011  8:32 AM 1.41     10/26/2012  8:56 AM 1.65     12/14/2013  10:23 AM 1.81     12/10/2014  8:36 AM 2.02     11/20/2015  11:29 AM 2.67     12/2/2016  9:44 AM 2.92     2/16/2018  9:13 AM 3.84     2/22/2019  9:21 AM 3.69     3/6/2020  9:22 AM 3.42     3/12/2021  9:30 AM 3.21     11/4/2022  7:59 AM <0.01     2/6/2023  8:12 AM  <0.01    5/12/2023  7:58 AM  <0.01    8/29/2023  7:27 AM  <0.01    12/20/2023  7:14 AM  <0.01    4/19/2024  9:09 AM  <0.01    8/16/2024  7:20 AM  <0.01    2/21/2025  7:13 AM  <0.01       Legend:  (E) External lab result    ASSESSMENT and PLAN  69 year old male returns today for follow-up of prostate cancer s/p robotic radical prostatectomy and bilateral pelvic lymph node dissection 8/12/2022 (iPSA 5.2, [place] 3+4=7, +BN invasion, + margin @ bladder neck) with combined robotic left inguinal hernia repair, post prostatectomy erectile dysfunction, stress urinary incontinence     Re: [place], stable, wears a pad for protection, stable    Re: prostate cancer, PSA remains undetectable, will check PSA 6 months    Re: ED, has been having some function without trimix. Will try to stop sildenafil, will try tadalafil 5 mg daily instead (rx drug management)    Return 6 months with PSA prior    Luke López MD   [facility]  [facility] Phone: [phone]      Again, thank you for allowing me to participate in the care of your patient.      Sincerely,    Luke López MD

## 2025-02-27 NOTE — PROGRESS NOTES
CHIEF COMPLAINT   Garrett Childers who is a 69 year old male returns today for follow-up of prostate cancer s/p robotic radical prostatectomy and bilateral pelvic lymph node dissection 8/12/2022 (iPSA 5.2, Sadie 3+4=7, +BN invasion, + margin @ bladder neck) with combined robotic left inguinal hernia repair, post prostatectomy erectile dysfunction, stress urinary incontinence     HPI   Garrett Childers is a 69 year old male returns today for follow-up of prostate cancer s/p robotic radical prostatectomy and bilateral pelvic lymph node dissection 8/12/2022 (iPSA 5.2, [place] 3+4=7, +BN invasion, + margin @ bladder neck) with combined robotic left inguinal hernia repair, post prostatectomy erectile dysfunction, stress urinary incontinence     Last seen aug 2024, we had continued sildenafil and also his trimix. He has been able to have some erectile function with sildenafil alone and not trimix. He has not been trying to time intercourse while on sidlenafil    PHYSICAL EXAM  Patient is a 69 year old  male   Vitals: Blood pressure (!) 143/80, pulse 57, height 1.829 m (6'), weight 103.4 kg (228 lb).  Body mass index is 30.92 kg/m .  General Appearance Adult:   Alert, no acute distress, oriented  HENT: throat/mouth:normal, good dentition  Lungs: no respiratory distress, or pursed lip breathing  Heart: No obvious jugular venous distension present  Abdomen: nondistended  Musculoskeltal: extremities normal, no peripheral edema  Skin: no suspicious lesions or rashes  Neuro: Alert, oriented, speech and mentation normal  Psych: affect and mood normal  Gait: Normal  : deferred     PSA PSA Tumor Marker   Latest Ref Rng 0.00 - 4.00 ug/L 0.00 - 4.50 ng/mL   6/8/2007  12:00 AM 1.0@ (E)    9/30/2008  11:39 AM 1.32     10/26/2009  8:54 AM 1.09     11/13/2010  8:37 AM 1.28     11/11/2011  8:32 AM 1.41     10/26/2012  8:56 AM 1.65     12/14/2013  10:23 AM 1.81     12/10/2014  8:36 AM 2.02     11/20/2015  11:29 AM 2.67     12/2/2016  9:44  AM 2.92     2/16/2018  9:13 AM 3.84     2/22/2019  9:21 AM 3.69     3/6/2020  9:22 AM 3.42     3/12/2021  9:30 AM 3.21     11/4/2022  7:59 AM <0.01     2/6/2023  8:12 AM  <0.01    5/12/2023  7:58 AM  <0.01    8/29/2023  7:27 AM  <0.01    12/20/2023  7:14 AM  <0.01    4/19/2024  9:09 AM  <0.01    8/16/2024  7:20 AM  <0.01    2/21/2025  7:13 AM  <0.01       Legend:  (E) External lab result    ASSESSMENT and PLAN  69 year old male returns today for follow-up of prostate cancer s/p robotic radical prostatectomy and bilateral pelvic lymph node dissection 8/12/2022 (iPSA 5.2, [place] 3+4=7, +BN invasion, + margin @ bladder neck) with combined robotic left inguinal hernia repair, post prostatectomy erectile dysfunction, stress urinary incontinence     Re: [place], stable, wears a pad for protection, stable    Re: prostate cancer, PSA remains undetectable, will check PSA 6 months    Re: ED, has been having some function without trimix. Will try to stop sildenafil, will try tadalafil 5 mg daily instead (rx drug management)    Return 6 months with PSA prior    Luke López MD   [facility]  [facility] Phone: [phone]

## 2025-04-25 SDOH — HEALTH STABILITY: PHYSICAL HEALTH: ON AVERAGE, HOW MANY MINUTES DO YOU ENGAGE IN EXERCISE AT THIS LEVEL?: 50 MIN

## 2025-04-25 SDOH — HEALTH STABILITY: PHYSICAL HEALTH: ON AVERAGE, HOW MANY DAYS PER WEEK DO YOU ENGAGE IN MODERATE TO STRENUOUS EXERCISE (LIKE A BRISK WALK)?: 6 DAYS

## 2025-04-25 ASSESSMENT — SOCIAL DETERMINANTS OF HEALTH (SDOH): HOW OFTEN DO YOU GET TOGETHER WITH FRIENDS OR RELATIVES?: TWICE A WEEK

## 2025-04-29 ENCOUNTER — OFFICE VISIT (OUTPATIENT)
Dept: INTERNAL MEDICINE | Facility: CLINIC | Age: 70
End: 2025-04-29
Attending: INTERNAL MEDICINE
Payer: COMMERCIAL

## 2025-04-29 VITALS
RESPIRATION RATE: 18 BRPM | WEIGHT: 229 LBS | DIASTOLIC BLOOD PRESSURE: 74 MMHG | HEART RATE: 59 BPM | TEMPERATURE: 98 F | BODY MASS INDEX: 31.02 KG/M2 | HEIGHT: 72 IN | OXYGEN SATURATION: 96 % | SYSTOLIC BLOOD PRESSURE: 124 MMHG

## 2025-04-29 DIAGNOSIS — I10 ESSENTIAL HYPERTENSION WITH GOAL BLOOD PRESSURE LESS THAN 140/90: ICD-10-CM

## 2025-04-29 DIAGNOSIS — E78.5 HYPERLIPIDEMIA LDL GOAL <100: ICD-10-CM

## 2025-04-29 DIAGNOSIS — Z13.6 SCREENING FOR CARDIOVASCULAR CONDITION: ICD-10-CM

## 2025-04-29 DIAGNOSIS — C61 PROSTATE CANCER (H): ICD-10-CM

## 2025-04-29 DIAGNOSIS — R06.09 EXERTIONAL DYSPNEA: ICD-10-CM

## 2025-04-29 DIAGNOSIS — Z13.1 SCREENING FOR DIABETES MELLITUS: ICD-10-CM

## 2025-04-29 DIAGNOSIS — Z00.00 ENCOUNTER FOR MEDICARE ANNUAL WELLNESS EXAM: Primary | ICD-10-CM

## 2025-04-29 DIAGNOSIS — N52.31 ERECTILE DYSFUNCTION AFTER RADICAL PROSTATECTOMY: ICD-10-CM

## 2025-04-29 DIAGNOSIS — Z13.220 SCREENING FOR HYPERLIPIDEMIA: ICD-10-CM

## 2025-04-29 DIAGNOSIS — R53.83 FATIGUE, UNSPECIFIED TYPE: ICD-10-CM

## 2025-04-29 DIAGNOSIS — Z79.899 MEDICATION MANAGEMENT: ICD-10-CM

## 2025-04-29 LAB
ALBUMIN UR-MCNC: NEGATIVE MG/DL
APPEARANCE UR: CLEAR
BACTERIA #/AREA URNS HPF: NORMAL /HPF
BILIRUB UR QL STRIP: NEGATIVE
COLOR UR AUTO: YELLOW
ERYTHROCYTE [DISTWIDTH] IN BLOOD BY AUTOMATED COUNT: 13 % (ref 10–15)
EST. AVERAGE GLUCOSE BLD GHB EST-MCNC: 103 MG/DL
GLUCOSE UR STRIP-MCNC: NEGATIVE MG/DL
HBA1C MFR BLD: 5.2 % (ref 0–5.6)
HCT VFR BLD AUTO: 45.3 % (ref 40–53)
HGB BLD-MCNC: 15.7 G/DL (ref 13.3–17.7)
HGB UR QL STRIP: NEGATIVE
KETONES UR STRIP-MCNC: NEGATIVE MG/DL
LEUKOCYTE ESTERASE UR QL STRIP: NEGATIVE
MCH RBC QN AUTO: 32.2 PG (ref 26.5–33)
MCHC RBC AUTO-ENTMCNC: 34.7 G/DL (ref 31.5–36.5)
MCV RBC AUTO: 93 FL (ref 78–100)
NITRATE UR QL: NEGATIVE
PH UR STRIP: 7 [PH] (ref 5–7)
PLATELET # BLD AUTO: 173 10E3/UL (ref 150–450)
RBC # BLD AUTO: 4.88 10E6/UL (ref 4.4–5.9)
RBC #/AREA URNS AUTO: NORMAL /HPF
SP GR UR STRIP: 1.02 (ref 1–1.03)
SQUAMOUS #/AREA URNS AUTO: NORMAL /LPF
UROBILINOGEN UR STRIP-ACNC: 0.2 E.U./DL
WBC # BLD AUTO: 4.9 10E3/UL (ref 4–11)
WBC #/AREA URNS AUTO: NORMAL /HPF

## 2025-04-29 PROCEDURE — G0439 PPPS, SUBSEQ VISIT: HCPCS | Performed by: INTERNAL MEDICINE

## 2025-04-29 PROCEDURE — 84153 ASSAY OF PSA TOTAL: CPT | Performed by: INTERNAL MEDICINE

## 2025-04-29 PROCEDURE — 85027 COMPLETE CBC AUTOMATED: CPT | Performed by: INTERNAL MEDICINE

## 2025-04-29 PROCEDURE — 80061 LIPID PANEL: CPT | Performed by: INTERNAL MEDICINE

## 2025-04-29 PROCEDURE — 80053 COMPREHEN METABOLIC PANEL: CPT | Performed by: INTERNAL MEDICINE

## 2025-04-29 PROCEDURE — 83036 HEMOGLOBIN GLYCOSYLATED A1C: CPT | Performed by: INTERNAL MEDICINE

## 2025-04-29 PROCEDURE — 3078F DIAST BP <80 MM HG: CPT | Performed by: INTERNAL MEDICINE

## 2025-04-29 PROCEDURE — 81001 URINALYSIS AUTO W/SCOPE: CPT | Performed by: INTERNAL MEDICINE

## 2025-04-29 PROCEDURE — 99213 OFFICE O/P EST LOW 20 MIN: CPT | Mod: 25 | Performed by: INTERNAL MEDICINE

## 2025-04-29 PROCEDURE — 36415 COLL VENOUS BLD VENIPUNCTURE: CPT | Performed by: INTERNAL MEDICINE

## 2025-04-29 PROCEDURE — 84443 ASSAY THYROID STIM HORMONE: CPT | Performed by: INTERNAL MEDICINE

## 2025-04-29 PROCEDURE — 93000 ELECTROCARDIOGRAM COMPLETE: CPT | Performed by: INTERNAL MEDICINE

## 2025-04-29 PROCEDURE — 3074F SYST BP LT 130 MM HG: CPT | Performed by: INTERNAL MEDICINE

## 2025-04-29 PROCEDURE — 84439 ASSAY OF FREE THYROXINE: CPT | Performed by: INTERNAL MEDICINE

## 2025-04-29 RX ORDER — SIMVASTATIN 20 MG
20 TABLET ORAL AT BEDTIME
Qty: 90 TABLET | Refills: 3 | Status: SHIPPED | OUTPATIENT
Start: 2025-04-29

## 2025-04-29 RX ORDER — TRIAMTERENE AND HYDROCHLOROTHIAZIDE 37.5; 25 MG/1; MG/1
1 TABLET ORAL DAILY
Qty: 90 TABLET | Refills: 3 | Status: SHIPPED | OUTPATIENT
Start: 2025-04-29

## 2025-04-29 RX ORDER — TADALAFIL 5 MG/1
5 TABLET ORAL EVERY 24 HOURS
Qty: 90 TABLET | Refills: 3 | Status: SHIPPED | OUTPATIENT
Start: 2025-04-29

## 2025-04-29 NOTE — PROGRESS NOTES
Preventive Care Visit  [facility]  Bong Vanegas MD, Internal Medicine  Apr 29, 2025      Assessment & Plan   (Z00.00) Encounter for Medicare annual wellness exam  (primary encounter diagnosis)  Comment: Stable health. See epic orders.     (E78.5) Hyperlipidemia LDL goal <100  Comment: Update lipids. Continue current meds.   Plan: Comprehensive metabolic panel, Lipid panel         reflex to direct LDL Fasting, simvastatin         (ZOCOR) 20 MG tablet, OFFICE/OUTPT         VISIT,EST,LEVL IV          (I10) Essential hypertension with goal blood pressure less than 140/90  Comment: BP at target. Continue current meds.   Plan: triamterene-HCTZ (MAXZIDE-25) 37.5-25 MG         tablet, OFFICE/OUTPT VISIT,EST,LEVL IV, UA with        Microscopic reflex to Culture - Clinic Collect,        UA Microscopic with Reflex to Culture          (N52.31) Erectile dysfunction after radical prostatectomy  Comment: Refilled tadalafil.   Plan: tadalafil (CIALIS) 5 MG tablet, OFFICE/OUTPT         VISIT,EST,LEVL IV          (C61) Prostate cancer (H)  Comment: Follow with Urology as they advise.   Plan: PSA tumor marker, CBC with platelets          (R06.09) Exertional dyspnea  Comment: EKG shows left anterior hemiblock--reassurance provided.   Plan: EKG 12-lead complete w/read - Clinics          (R53.83) Fatigue, unspecified type  Plan: TSH with free T4 reflex, CBC with platelets    (Z13.220) Screening for hyperlipidemia  Plan: Lipid panel reflex to direct LDL Fasting    (Z13.1) Screening for diabetes mellitus  Plan: Hemoglobin A1c    (Z79.899) Medication manageme  Plan: TSH with free T4 reflex               Patient has been advised of split billing requirements and indicates understanding: Yes        Counseling  Appropriate preventive services were addressed with this patient via screening, questionnaire, or discussion as appropriate for fall prevention, nutrition, physical activity, Tobacco-use cessation, social  engagement, weight loss and cognition.  Checklist reviewing preventive services available has been given to the patient.  Reviewed patient's diet, addressing concerns and/or questions.   Information on urinary incontinence and treatment options given to patient.     Patient Instructions   Everything looks fine.    Refills of medications have been faxed to your pharmacy.     Lab results will be available soon on [facility].    See me in a year, sooner if problems.             Gillian Manuel is a 69 year old, presenting for the following:  Medicare Visit        4/29/2025     8:15 AM   Additional Questions   Roomed by Edda STOKES  In addition to an Annual Wellness Exam, we addressed hypertension, hyperlipidemia, erectile dysfunction and frequency/slowing of urinary stream.    - No current heart problems reported.  - Exertional shortness of breath noted during vigorous workouts, particularly on the treadmill.  - History of prostate cancer detected through PSA screening 2 years ago.  - No recent fevers, chills, cough, or trouble breathing.  - No chest tightness or pressure during weight lifting or treadmill use.  - No heart racing or skipping.  - No problems with heartburn, stomach pain, diarrhea, or blood in stool.  - No severe frequent headaches or changes in vision or speech.  - No unusually frequent urination or thirstiness.  - No feeling warmer or colder than others around.  - No new muscle or joint aches or pains.  - No skin concerns, sores that won't heal, or rashes.  - All of your things I think I found a a full rabbit hole where some of them      BP appears satisfactorily controlled on current meds.     Reviewed that his 10-year risk of stroke/heart attack warrants continued use of statins. He continues on simvastatin.     Tadalafil did seem to work in the past to some degree for urinary symptoms, but he is no longer taking tadalafil.   He has worked with Urology for treatments for erectile  dysfunction. He has tried sildenafil, tadalafil, vacuum erection device, and more recently has been prescribed intracavernous Trimix          Advance Care Planning    Patient states has Health Care Directive and will send to [employer].        4/25/2025   General Health   How would you rate your overall physical health? Excellent   Feel stress (tense, anxious, or unable to sleep) Only a little   (!) STRESS CONCERN      4/25/2025   Nutrition   Diet: Regular (no restrictions)         4/25/2025   Exercise   Days per week of moderate/strenous exercise 6 days   Average minutes spent exercising at this level 50 min         4/25/2025   Social Factors   Frequency of gathering with friends or relatives Twice a week   Worry food won't last until get money to buy more No   Food not last or not have enough money for food? No   Do you have housing? (Housing is defined as stable permanent housing and does not include staying outside in a car, in a tent, in an abandoned building, in an overnight shelter, or couch-surfing.) Yes   Are you worried about losing your housing? No   Lack of transportation? No   Unable to get utilities (heat,electricity)? No         4/25/2025   Fall Risk   Fallen 2 or more times in the past year? No   Trouble with walking or balance? No          4/25/2025   Activities of Daily Living- Home Safety   Needs help with the following daily activites None of the above   Safety concerns in the home None of the above         4/25/2025   Dental   Dentist two times every year? Yes         4/25/2025   Hearing Screening   Hearing concerns? None of the above         4/25/2025   Driving Risk Screening   Patient/family members have concerns about driving No         4/25/2025   General Alertness/Fatigue Screening   Have you been more tired than usual lately? No         4/25/2025   Urinary Incontinence Screening   Bothered by leaking urine in past 6 months Yes         Today's PHQ-2 Score:       4/29/2025     8:08 AM    PHQ-2 ( 1999 [employer])   Q1: Little interest or pleasure in doing things 0   Q2: Feeling down, depressed or hopeless 0   PHQ-2 Score 0    Q1: Little interest or pleasure in doing things Not at all   Q2: Feeling down, depressed or hopeless Not at all   PHQ-2 Score 0       Patient-reported           4/25/2025   Substance Use   Alcohol more than 3/day or more than 7/wk No   Do you have a current opioid prescription? No   How severe/bad is pain from 1 to 10? 1/10   Do you use any other substances recreationally? No     Social History     Tobacco Use    Smoking status: Never     Passive exposure: Yes    Smokeless tobacco: Former     Quit date: 1/1/2005    Tobacco comments:     cigar, rarely   Vaping Use    Vaping status: Never Used   Substance Use Topics    Alcohol use: Yes     Comment: <6 drinks/week. May have 2 beers or one mixed drinks on a given day    Drug use: No           4/25/2025   AAA Screening   Family history of Abdominal Aortic Aneurysm (AAA)? No   Last PSA:   PSA   Date Value Ref Range Status   03/12/2021 3.21 0 - 4 ug/L Final     Comment:     Assay Method:  Chemiluminescence using Siemens Vista analyzer     Prostate Specific Antigen Screen   Date Value Ref Range Status   03/25/2022 5.20 (H) 0.00 - 4.00 ug/L Final     PSA Tumor Marker   Date Value Ref Range Status   02/21/2025 <0.01 0.00 - 4.50 ng/mL Final   11/04/2022 <0.01 0.00 - 4.00 ug/L Final     ASCVD Risk   The 10-year ASCVD risk score (Belia AGUILAR, et al., 2019) is: 16.6%    Values used to calculate the score:      Age: 69 years      Sex: Male      Is Non- : No      Diabetic: No      Tobacco smoker: No      Systolic Blood Pressure: 124 mmHg      Is BP treated: Yes      HDL Cholesterol: 47 mg/dL      Total Cholesterol: 175 mg/dL          Reviewed and updated as needed this visit by Provider                    Past medical, family and social histories as well as medications reviewed and updated as needed.    Current  "providers sharing in care for this patient include:  Patient Care Team:  Bong Vanegas MD as PCP - General (Internal Medicine)  Bong Vanegas MD as Assigned PCP  Luke López MD as MD (Urology)  Luke López MD as Assigned Surgical Provider  Lauren Holly AuD as Audiologist (Audiology)    The following health maintenance items are reviewed in Epic and correct as of today:  Health Maintenance   Topic Date Due    BMP  04/19/2025    LIPID  04/19/2025    MEDICARE ANNUAL WELLNESS VISIT  04/19/2025    COVID-19 Vaccine (7 - 2024-25 season) 05/04/2025    ANNUAL REVIEW OF HM ORDERS  02/19/2026    FALL RISK ASSESSMENT  04/29/2026    DIABETES SCREENING  04/19/2027    COLORECTAL CANCER SCREENING  12/04/2027    DTAP/TDAP/TD IMMUNIZATION (2 - Td or Tdap) 02/16/2028    ADVANCE CARE PLANNING  04/19/2029    HEPATITIS C SCREENING  Completed    PHQ-2 (once per calendar year)  Completed    INFLUENZA VACCINE  Completed    Pneumococcal Vaccine: 50+ Years  Completed    ZOSTER IMMUNIZATION  Completed    RSV VACCINE  Completed    HPV IMMUNIZATION  Aged Out    MENINGITIS IMMUNIZATION  Aged Out       REVIEW OF SYSTEMS: The following systems have been completely reviewed and are negative except as noted above:   Constitutional, HEENT, respiratory, cardiovascular, gastrointestinal, genitourinary, musculoskeletal, dermatologic, hematologic, endocrine, psychiatric, and neurologic systems.       Objective    Exam  /74 (BP Location: Right arm, Patient Position: Sitting, Cuff Size: Adult Large)   Pulse 59   Temp 98  F (36.7  C) (Tympanic)   Resp 18   Ht 1.82 m (5' 11.65\")   Wt 103.9 kg (229 lb)   SpO2 96%   BMI 31.36 kg/m     Estimated body mass index is 31.36 kg/m  as calculated from the following:    Height as of this encounter: 1.82 m (5' 11.65\").    Weight as of this encounter: 103.9 kg (229 lb).    Physical Exam  GENERAL: alert and no distress  EYES: Eyes grossly normal to inspection, PERRL and conjunctivae and " sclerae normal  HENT: ear canals and TM's normal, nose and mouth without ulcers or lesions  NECK: no adenopathy, no asymmetry, masses, or scars  RESP: lungs clear to auscultation - no rales, rhonchi or wheezes  CV: regular rate and rhythm, normal S1 S2, no S3 or S4, no murmur, click or rub, no peripheral edema  ABDOMEN: soft, nontender, no hepatosplenomegaly, no masses and bowel sounds normal  MS: no gross musculoskeletal defects noted, no edema  SKIN: no suspicious lesions or rashes  NEURO: Normal strength and tone, mentation intact and speech normal  PSYCH: mentation appears normal, affect normal/bright        4/29/2025   Mini Cog   Clock Draw Score 2 Normal   3 Item Recall 3 objects recalled   Mini Cog Total Score 5           12-Lead EKG: Reviewed. Sinus rhythm. Left anterior fascicular block--reviewed electrophysiology with patient.   Normal intervals. No significant ST-T wave abnormalities or acute ischemic changes.        Recent results:  - EKG ordered for exertional dyspnea.  - PSA test to be conducted.  - A1c test to be conducted.  - Urinalysis to be conducted.    Signed Electronically by: Bong Vanegas MD,

## 2025-04-29 NOTE — PATIENT INSTRUCTIONS
Patient Education   Preventive Care Advice   This is general advice given by our system to help you stay healthy. However, your care team may have specific advice just for you. Please talk to your care team about your preventive care needs.  Nutrition  Eat 5 or more servings of fruits and vegetables each day.  Try wheat bread, brown rice and whole grain pasta (instead of white bread, rice, and pasta).  Get enough calcium and vitamin D. Check the label on foods and aim for 100% of the RDA (recommended daily allowance).  Lifestyle  Exercise at least 150 minutes each week  (30 minutes a day, 5 days a week).  Do muscle strengthening activities 2 days a week. These help control your weight and prevent disease.  No smoking.  Wear sunscreen to prevent skin cancer.  Have a dental exam and cleaning every 6 months.  Yearly exams  See your health care team every year to talk about:  Any changes in your health.  Any medicines your care team has prescribed.  Preventive care, family planning, and ways to prevent chronic diseases.  Shots (vaccines)   HPV shots (up to age 26), if you've never had them before.  Hepatitis B shots (up to age 59), if you've never had them before.  COVID-19 shot: Get this shot when it's due.  Flu shot: Get a flu shot every year.  Tetanus shot: Get a tetanus shot every 10 years.  Pneumococcal, hepatitis A, and RSV shots: Ask your care team if you need these based on your risk.  Shingles shot (for age 50 and up)  General health tests  Diabetes screening:  Starting at age 35, Get screened for diabetes at least every 3 years.  If you are younger than age 35, ask your care team if you should be screened for diabetes.  Cholesterol test: At age 39, start having a cholesterol test every 5 years, or more often if advised.  Bone density scan (DEXA): At age 50, ask your care team if you should have this scan for osteoporosis (brittle bones).  Hepatitis C: Get tested at least once in your life.  STIs (sexually  transmitted infections)  Before age 24: Ask your care team if you should be screened for STIs.  After age 24: Get screened for STIs if you're at risk. You are at risk for STIs (including HIV) if:  You are sexually active with more than one person.  You don't use condoms every time.  You or a partner was diagnosed with a sexually transmitted infection.  If you are at risk for HIV, ask about PrEP medicine to prevent HIV.  Get tested for HIV at least once in your life, whether you are at risk for HIV or not.  Cancer screening tests  Cervical cancer screening: If you have a cervix, begin getting regular cervical cancer screening tests starting at age 21.  Breast cancer scan (mammogram): If you've ever had breasts, begin having regular mammograms starting at age 40. This is a scan to check for breast cancer.  Colon cancer screening: It is important to start screening for colon cancer at age 45.  Have a colonoscopy test every 10 years (or more often if you're at risk) Or, ask your provider about stool tests like a FIT test every year or Cologuard test every 3 years.  To learn more about your testing options, visit:   .  For help making a decision, visit:   https://bit.ly/zn47833.  Prostate cancer screening test: If you have a prostate, ask your care team if a prostate cancer screening test (PSA) at age 55 is right for you.  Lung cancer screening: If you are a current or former smoker ages 50 to 80, ask your care team if ongoing lung cancer screenings are right for you.  For informational purposes only. Not to replace the advice of your health care provider. Copyright   2023 [facility] [employer]. All rights reserved. Clinically reviewed by the [facility] Program. [employer] 342213 - REV 01/24.  Bladder Training: Care Instructions  Your Care Instructions     Bladder training is used to treat urge incontinence and stress incontinence. Urge incontinence means that the need to urinate comes on so fast  that you can't get to a toilet in time. Stress incontinence means that you leak urine because of pressure on your bladder. For example, it may happen when you laugh, cough, or lift something heavy.  Bladder training can increase how long you can wait before you have to urinate. It can also help your bladder hold more urine. And it can give you better control over the urge to urinate.  It is important to remember that bladder training takes a few weeks to a few months to make a difference. You may not see results right away, but don't give up.  Follow-up care is a key part of your treatment and safety. Be sure to make and go to all appointments, and call your doctor if you are having problems. It's also a good idea to know your test results and keep a list of the medicines you take.  How can you care for yourself at home?  Work with your doctor to come up with a bladder training program that is right for you. You may use one or more of the following methods.  Delayed urination  In the beginning, try to keep from urinating for 5 minutes after you first feel the need to go.  While you wait, take deep, slow breaths to relax. Kegel exercises can also help you delay the need to go to the bathroom.  After some practice, when you can easily wait 5 minutes to urinate, try to wait 10 minutes before you urinate.  Slowly increase the waiting period until you are able to control when you have to urinate.  Scheduled urination  Empty your bladder when you first wake up in the morning.  Schedule times throughout the day when you will urinate.  Start by going to the bathroom every hour, even if you don't need to go.  Slowly increase the time between trips to the bathroom.  When you have found a schedule that works well for you, keep doing it.  If you wake up during the night and have to urinate, do it. Apply your schedule to waking hours only.  Kegel exercises  These tighten and strengthen pelvic muscles, which can help you control  "the flow of urine. (If doing these exercises causes pain, stop doing them and talk with your doctor.) To do Kegel exercises:  Squeeze your muscles as if you were trying not to pass gas. Or squeeze your muscles as if you were stopping the flow of urine. Your belly, legs, and buttocks shouldn't move.  Hold the squeeze for 3 seconds, then relax for 5 to 10 seconds.  Start with 3 seconds, then add 1 second each week until you are able to squeeze for 10 seconds.  Repeat the exercise 10 times a session. Do 3 to 8 sessions a day.  When should you call for help?  Watch closely for changes in your health, and be sure to contact your doctor if:    Your incontinence is getting worse.     You do not get better as expected.   Where can you learn more?  Go to https://www.[employer].net/patiented  Enter V684 in the search box to learn more about \"Bladder Training: Care Instructions.\"  Current as of: April 30, 2024  Content Version: 14.4    [id number] [employer].   Care instructions adapted under license by your healthcare professional. If you have questions about a medical condition or this instruction, always ask your healthcare professional. [employer] disclaims any warranty or liability for your use of this information.       Patient Instructions   Everything looks fine.    Refills of medications have been faxed to your pharmacy.     Lab results will be available soon on [facility].    See me in a year, sooner if problems.     "

## 2025-04-30 LAB
ALBUMIN SERPL BCG-MCNC: 4.3 G/DL (ref 3.5–5.2)
ALP SERPL-CCNC: 83 U/L (ref 40–150)
ALT SERPL W P-5'-P-CCNC: 52 U/L (ref 0–70)
ANION GAP SERPL CALCULATED.3IONS-SCNC: 6 MMOL/L (ref 7–15)
AST SERPL W P-5'-P-CCNC: 45 U/L (ref 0–45)
BILIRUB SERPL-MCNC: 0.5 MG/DL
BUN SERPL-MCNC: 18.1 MG/DL (ref 8–23)
CALCIUM SERPL-MCNC: 9.1 MG/DL (ref 8.8–10.4)
CHLORIDE SERPL-SCNC: 103 MMOL/L (ref 98–107)
CHOLEST SERPL-MCNC: 172 MG/DL
CREAT SERPL-MCNC: 0.86 MG/DL (ref 0.67–1.17)
EGFRCR SERPLBLD CKD-EPI 2021: >90 ML/MIN/1.73M2
FASTING STATUS PATIENT QL REPORTED: YES
FASTING STATUS PATIENT QL REPORTED: YES
GLUCOSE SERPL-MCNC: 100 MG/DL (ref 70–99)
HCO3 SERPL-SCNC: 30 MMOL/L (ref 22–29)
HDLC SERPL-MCNC: 44 MG/DL
LDLC SERPL CALC-MCNC: 99 MG/DL
NONHDLC SERPL-MCNC: 128 MG/DL
POTASSIUM SERPL-SCNC: 4.1 MMOL/L (ref 3.4–5.3)
PROT SERPL-MCNC: 7.1 G/DL (ref 6.4–8.3)
PSA SERPL DL<=0.01 NG/ML-MCNC: <0.01 NG/ML (ref 0–4.5)
SODIUM SERPL-SCNC: 139 MMOL/L (ref 135–145)
T4 FREE SERPL-MCNC: 1.23 NG/DL (ref 0.9–1.7)
TRIGL SERPL-MCNC: 145 MG/DL
TSH SERPL DL<=0.005 MIU/L-ACNC: 4.52 UIU/ML (ref 0.3–4.2)

## 2025-05-03 ENCOUNTER — HEALTH MAINTENANCE LETTER (OUTPATIENT)
Age: 70
End: 2025-05-03

## 2025-05-16 ENCOUNTER — MYC MEDICAL ADVICE (OUTPATIENT)
Dept: INTERNAL MEDICINE | Facility: CLINIC | Age: 70
End: 2025-05-16
Payer: COMMERCIAL

## 2025-05-16 DIAGNOSIS — L84 CORN OR CALLUS: Primary | ICD-10-CM

## 2025-05-16 DIAGNOSIS — M79.673 PAIN OF FOOT, UNSPECIFIED LATERALITY: ICD-10-CM

## 2025-05-19 ENCOUNTER — PATIENT OUTREACH (OUTPATIENT)
Dept: CARE COORDINATION | Facility: CLINIC | Age: 70
End: 2025-05-19
Payer: COMMERCIAL

## 2025-05-21 ENCOUNTER — PATIENT OUTREACH (OUTPATIENT)
Dept: CARE COORDINATION | Facility: CLINIC | Age: 70
End: 2025-05-21
Payer: COMMERCIAL

## 2025-05-28 DIAGNOSIS — N52.31 ERECTILE DYSFUNCTION AFTER RADICAL PROSTATECTOMY: ICD-10-CM

## 2025-05-28 RX ORDER — TADALAFIL 5 MG/1
5 TABLET ORAL EVERY 24 HOURS
Qty: 90 TABLET | Refills: 3 | Status: SHIPPED | OUTPATIENT
Start: 2025-05-28

## 2025-07-30 ENCOUNTER — APPOINTMENT (OUTPATIENT)
Age: 70
Setting detail: DERMATOLOGY
End: 2025-07-30

## 2025-07-30 DIAGNOSIS — L57.0 ACTINIC KERATOSIS: ICD-10-CM

## 2025-07-30 DIAGNOSIS — Z87.2 PERSONAL HISTORY OF DISEASES OF THE SKIN AND SUBCUTANEOUS TISSUE: ICD-10-CM

## 2025-07-30 DIAGNOSIS — L82.1 OTHER SEBORRHEIC KERATOSIS: ICD-10-CM

## 2025-07-30 DIAGNOSIS — D18.0 HEMANGIOMA: ICD-10-CM

## 2025-07-30 DIAGNOSIS — Z71.89 OTHER SPECIFIED COUNSELING: ICD-10-CM

## 2025-07-30 DIAGNOSIS — D485 NEOPLASM OF UNCERTAIN BEHAVIOR OF SKIN: ICD-10-CM

## 2025-07-30 DIAGNOSIS — Z85.828 PERSONAL HISTORY OF OTHER MALIGNANT NEOPLASM OF SKIN: ICD-10-CM

## 2025-07-30 DIAGNOSIS — D22 MELANOCYTIC NEVI: ICD-10-CM

## 2025-07-30 PROBLEM — D48.5 NEOPLASM OF UNCERTAIN BEHAVIOR OF SKIN: Status: ACTIVE | Noted: 2025-07-30

## 2025-07-30 PROBLEM — D18.01 HEMANGIOMA OF SKIN AND SUBCUTANEOUS TISSUE: Status: ACTIVE | Noted: 2025-07-30

## 2025-07-30 PROBLEM — D22.5 MELANOCYTIC NEVI OF TRUNK: Status: ACTIVE | Noted: 2025-07-30

## 2025-07-30 PROCEDURE — ? BIOPSY BY SHAVE METHOD

## 2025-07-30 PROCEDURE — ? COUNSELING

## 2025-07-30 PROCEDURE — ? SUNSCREEN RECOMMENDATIONS

## 2025-07-30 PROCEDURE — ? OBSERVATION

## 2025-07-30 ASSESSMENT — LOCATION SIMPLE DESCRIPTION DERM
LOCATION SIMPLE: UPPER BACK
LOCATION SIMPLE: INFERIOR FOREHEAD
LOCATION SIMPLE: ABDOMEN
LOCATION SIMPLE: LEFT FOREHEAD
LOCATION SIMPLE: RIGHT TEMPLE
LOCATION SIMPLE: RIGHT LOWER BACK
LOCATION SIMPLE: RIGHT FOREHEAD
LOCATION SIMPLE: RIGHT THIGH
LOCATION SIMPLE: CHEST

## 2025-07-30 ASSESSMENT — LOCATION DETAILED DESCRIPTION DERM
LOCATION DETAILED: EPIGASTRIC SKIN
LOCATION DETAILED: RIGHT ANTERIOR PROXIMAL THIGH
LOCATION DETAILED: LEFT INFERIOR MEDIAL FOREHEAD
LOCATION DETAILED: SUPERIOR THORACIC SPINE
LOCATION DETAILED: RIGHT FOREHEAD
LOCATION DETAILED: RIGHT SUPERIOR MEDIAL MIDBACK
LOCATION DETAILED: MIDDLE STERNUM
LOCATION DETAILED: INFERIOR MID FOREHEAD
LOCATION DETAILED: RIGHT CENTRAL TEMPLE

## 2025-07-30 ASSESSMENT — LOCATION ZONE DERM
LOCATION ZONE: FACE
LOCATION ZONE: LEG
LOCATION ZONE: TRUNK

## 2025-08-26 ENCOUNTER — LAB (OUTPATIENT)
Dept: LAB | Facility: CLINIC | Age: 70
End: 2025-08-26
Payer: COMMERCIAL

## 2025-08-26 DIAGNOSIS — C61 PROSTATE CANCER (H): ICD-10-CM

## 2025-08-26 LAB — PSA SERPL DL<=0.01 NG/ML-MCNC: <0.01 NG/ML (ref 0–4.5)

## 2025-08-26 PROCEDURE — 36415 COLL VENOUS BLD VENIPUNCTURE: CPT

## 2025-08-26 PROCEDURE — 84153 ASSAY OF PSA TOTAL: CPT

## 2025-08-28 ENCOUNTER — OFFICE VISIT (OUTPATIENT)
Dept: UROLOGY | Facility: CLINIC | Age: 70
End: 2025-08-28
Payer: COMMERCIAL

## 2025-08-28 VITALS — SYSTOLIC BLOOD PRESSURE: 121 MMHG | DIASTOLIC BLOOD PRESSURE: 73 MMHG | HEART RATE: 54 BPM

## 2025-08-28 DIAGNOSIS — N39.3 SUI (STRESS URINARY INCONTINENCE), MALE: ICD-10-CM

## 2025-08-28 DIAGNOSIS — N52.31 ERECTILE DYSFUNCTION AFTER RADICAL PROSTATECTOMY: ICD-10-CM

## 2025-08-28 DIAGNOSIS — C61 PROSTATE CANCER (H): Primary | ICD-10-CM

## 2025-08-28 RX ORDER — TADALAFIL 5 MG/1
5 TABLET ORAL EVERY 24 HOURS
Qty: 90 TABLET | Refills: 3 | Status: SHIPPED | OUTPATIENT
Start: 2025-08-28

## 2025-08-28 ASSESSMENT — PAIN SCALES - GENERAL: PAINLEVEL_OUTOF10: NO PAIN (0)

## (undated) DEVICE — CATH HOLDER STRAP 36600

## (undated) DEVICE — SU VICRYL 3-0 SH 27" J316H

## (undated) DEVICE — SU VICRYL 0 UR-6 27" J603H

## (undated) DEVICE — SU MONOCRYL 4-0 PS-2 18" UND Y496G

## (undated) DEVICE — GOWN IMPERVIOUS SPECIALTY XLG/XLONG 32474

## (undated) DEVICE — ENDO POUCH UNIV RETRIEVAL SYSTEM INZII 10MM CD001

## (undated) DEVICE — GLOVE PROTEXIS BLUE W/NEU-THERA 7.0  2D73EB70

## (undated) DEVICE — SPONGE LAP 4X18" X8415

## (undated) DEVICE — CATH FOLEY 20FR 5ML LUBRICATH LATEX 0165L20

## (undated) DEVICE — PACK DAVINCI UROLOGY SBA15UDFSG

## (undated) DEVICE — SUCTION IRR STRYKERFLOW II W/TIP 250-070-520

## (undated) DEVICE — KIT ENDO TURNOVER/PROCEDURE W/CLEAN A SCOPE LINERS 103888

## (undated) DEVICE — DRAIN JACKSON PRATT ROUND SIL 19FR W/TROCAR LF JP-2232

## (undated) DEVICE — PREP CHLORHEXIDINE 4% 4OZ (HIBICLENS) 57504

## (undated) DEVICE — KIT PATIENT POSITIONING PIGAZZI LATEX FREE 40580

## (undated) DEVICE — SOL WATER IRRIG 1000ML BOTTLE 2F7114

## (undated) DEVICE — SU SILK 2-0 SH 30" K833H

## (undated) DEVICE — SU WND CLOSURE VLOC 180 ABS 2-0 9" GS-22 VLOCL2145

## (undated) DEVICE — DAVINCI XI SEAL UNIVERSAL 5-8MM 470361

## (undated) DEVICE — TUBING CONMED AIRSEAL SMOKE EVAC INSUFFLATION ASM-EVAC

## (undated) DEVICE — SOL NACL 0.9% IRRIG 3000ML BAG 2B7477

## (undated) DEVICE — GLOVE PROTEXIS BLUE W/NEU-THERA 8.0  2D73EB80

## (undated) DEVICE — GLOVE PROTEXIS W/NEU-THERA 6.5  2D73TE65

## (undated) DEVICE — PROTECTOR ARM ONE-STEP TRENDELENBURG 40418

## (undated) DEVICE — ESU PENCIL W/HOLSTER E2350H

## (undated) DEVICE — SU VICRYL 0 TIE 12X18" J906G

## (undated) DEVICE — SURGICEL HEMOSTAT 2X14" 1951

## (undated) DEVICE — SU VICRYL 4-0 RB-1 27" J304

## (undated) DEVICE — GLOVE PROTEXIS W/NEU-THERA 7.0  2D73TE70

## (undated) DEVICE — STERILE SINGLE BASIN PACK

## (undated) DEVICE — SU WND CLOSURE VLOC 90 ABS 3-0 VIOLET 6" CV-23 VLOCM0804

## (undated) DEVICE — ESU GROUND PAD ADULT W/CORD E7507

## (undated) DEVICE — CLIP ENDO HEMO-LOC PURPLE LG 544240

## (undated) DEVICE — CATH FOLEY 18FR 5ML LUBRICATH LATEX 0165L18

## (undated) DEVICE — ENDO TROCAR CONMED AIRSEAL BLADELESS 12X120MM IAS12-120LP

## (undated) DEVICE — LINEN ORTHO ACL PACK 5447

## (undated) DEVICE — SU VICRYL 4-0 PS-2 18" UND J496H

## (undated) DEVICE — GLOVE PROTEXIS POWDER FREE 7.5 ORTHOPEDIC 2D73ET75

## (undated) DEVICE — DAVINCI XI DRAPE COLUMN 470341

## (undated) DEVICE — DAVINCI HOT SHEARS TIP COVER  400180

## (undated) DEVICE — BLADE KNIFE SURG 11 371111

## (undated) DEVICE — DRAPE MAYO STAND 23X54 8337

## (undated) DEVICE — DRSG TELFA 2X3"

## (undated) DEVICE — SPONGE RAY-TEC 4X8" 7318

## (undated) DEVICE — SU WND CLOSURE V-LOC 3-0 CV-23 6" VLOCM1904

## (undated) DEVICE — NDL INSUFFLATION 13GA 120MM C2201

## (undated) DEVICE — DAVINCI XI DRAPE ARM 470015

## (undated) DEVICE — DRSG GAUZE 4X4" 8044

## (undated) DEVICE — BLADE CLIPPER 3M 9670

## (undated) DEVICE — SU SILK 2-0 PSL 18" 673H

## (undated) DEVICE — SYSTEM LAPAROVUE VISIBILITY LAPVUE10

## (undated) DEVICE — DEVICE SUTURE PASSER 14GA WECK EFX EFXSP2

## (undated) RX ORDER — PROPOFOL 10 MG/ML
INJECTION, EMULSION INTRAVENOUS
Status: DISPENSED
Start: 2022-08-12

## (undated) RX ORDER — LIDOCAINE HYDROCHLORIDE 10 MG/ML
INJECTION, SOLUTION EPIDURAL; INFILTRATION; INTRACAUDAL; PERINEURAL
Status: DISPENSED
Start: 2022-08-12

## (undated) RX ORDER — CEFAZOLIN SODIUM/WATER 2 G/20 ML
SYRINGE (ML) INTRAVENOUS
Status: DISPENSED
Start: 2022-08-12

## (undated) RX ORDER — CEFAZOLIN SODIUM 1 G/3ML
INJECTION, POWDER, FOR SOLUTION INTRAMUSCULAR; INTRAVENOUS
Status: DISPENSED
Start: 2022-08-12

## (undated) RX ORDER — DEXAMETHASONE SODIUM PHOSPHATE 4 MG/ML
INJECTION, SOLUTION INTRA-ARTICULAR; INTRALESIONAL; INTRAMUSCULAR; INTRAVENOUS; SOFT TISSUE
Status: DISPENSED
Start: 2022-08-12

## (undated) RX ORDER — FENTANYL CITRATE 50 UG/ML
INJECTION, SOLUTION INTRAMUSCULAR; INTRAVENOUS
Status: DISPENSED
Start: 2017-12-04

## (undated) RX ORDER — BUPIVACAINE HYDROCHLORIDE 2.5 MG/ML
INJECTION, SOLUTION EPIDURAL; INFILTRATION; INTRACAUDAL
Status: DISPENSED
Start: 2022-08-12

## (undated) RX ORDER — GLYCOPYRROLATE 0.2 MG/ML
INJECTION INTRAMUSCULAR; INTRAVENOUS
Status: DISPENSED
Start: 2022-08-12

## (undated) RX ORDER — ONDANSETRON 2 MG/ML
INJECTION INTRAMUSCULAR; INTRAVENOUS
Status: DISPENSED
Start: 2022-08-12

## (undated) RX ORDER — FENTANYL CITRATE 50 UG/ML
INJECTION, SOLUTION INTRAMUSCULAR; INTRAVENOUS
Status: DISPENSED
Start: 2022-08-12

## (undated) RX ORDER — LABETALOL HYDROCHLORIDE 5 MG/ML
INJECTION, SOLUTION INTRAVENOUS
Status: DISPENSED
Start: 2022-08-12

## (undated) RX ORDER — NEOSTIGMINE METHYLSULFATE 1 MG/ML
VIAL (ML) INJECTION
Status: DISPENSED
Start: 2022-08-12